# Patient Record
Sex: MALE | Race: WHITE | NOT HISPANIC OR LATINO | Employment: FULL TIME | URBAN - NONMETROPOLITAN AREA
[De-identification: names, ages, dates, MRNs, and addresses within clinical notes are randomized per-mention and may not be internally consistent; named-entity substitution may affect disease eponyms.]

---

## 2023-03-13 LAB
BASOPHILS (10*3/UL) IN BLOOD BY AUTOMATED COUNT: 0.03 X10E9/L (ref 0–0.1)
BASOPHILS/100 LEUKOCYTES IN BLOOD BY AUTOMATED COUNT: 0.4 % (ref 0–2)
EOSINOPHILS (10*3/UL) IN BLOOD BY AUTOMATED COUNT: 0.46 X10E9/L (ref 0–0.7)
EOSINOPHILS/100 LEUKOCYTES IN BLOOD BY AUTOMATED COUNT: 6.5 % (ref 0–6)
ERYTHROCYTE DISTRIBUTION WIDTH (RATIO) BY AUTOMATED COUNT: 14.1 % (ref 11.5–14.5)
ERYTHROCYTE MEAN CORPUSCULAR HEMOGLOBIN CONCENTRATION (G/DL) BY AUTOMATED: 29.1 G/DL (ref 32–36)
ERYTHROCYTE MEAN CORPUSCULAR VOLUME (FL) BY AUTOMATED COUNT: 76 FL (ref 80–100)
ERYTHROCYTES (10*6/UL) IN BLOOD BY AUTOMATED COUNT: 4.47 X10E12/L (ref 4.5–5.9)
FERRITIN (UG/LL) IN SER/PLAS: 12 UG/L (ref 20–300)
HEMATOCRIT (%) IN BLOOD BY AUTOMATED COUNT: 34 % (ref 41–52)
HEMOGLOBIN (G/DL) IN BLOOD: 9.9 G/DL (ref 13.5–17.5)
IMMATURE GRANULOCYTES/100 LEUKOCYTES IN BLOOD BY AUTOMATED COUNT: 0.6 % (ref 0–0.9)
IRON (UG/DL) IN SER/PLAS: 21 UG/DL (ref 35–150)
IRON BINDING CAPACITY (UG/DL) IN SER/PLAS: >471 UG/DL (ref 240–445)
IRON SATURATION (%) IN SER/PLAS: ABNORMAL % (ref 25–45)
LEUKOCYTES (10*3/UL) IN BLOOD BY AUTOMATED COUNT: 7.1 X10E9/L (ref 4.4–11.3)
LYMPHOCYTES (10*3/UL) IN BLOOD BY AUTOMATED COUNT: 1.39 X10E9/L (ref 1.2–4.8)
LYMPHOCYTES/100 LEUKOCYTES IN BLOOD BY AUTOMATED COUNT: 19.5 % (ref 13–44)
MONOCYTES (10*3/UL) IN BLOOD BY AUTOMATED COUNT: 0.49 X10E9/L (ref 0.1–1)
MONOCYTES/100 LEUKOCYTES IN BLOOD BY AUTOMATED COUNT: 6.9 % (ref 2–10)
NEUTROPHILS (10*3/UL) IN BLOOD BY AUTOMATED COUNT: 4.71 X10E9/L (ref 1.2–7.7)
NEUTROPHILS/100 LEUKOCYTES IN BLOOD BY AUTOMATED COUNT: 66.1 % (ref 40–80)
PLATELETS (10*3/UL) IN BLOOD AUTOMATED COUNT: 244 X10E9/L (ref 150–450)

## 2023-04-03 LAB
BASOPHILS (10*3/UL) IN BLOOD BY AUTOMATED COUNT: 0.05 X10E9/L (ref 0–0.1)
BASOPHILS/100 LEUKOCYTES IN BLOOD BY AUTOMATED COUNT: 0.6 % (ref 0–2)
DACROCYTES PRESENCE IN BLOOD BY LIGHT MICROSCOPY: NORMAL
EOSINOPHILS (10*3/UL) IN BLOOD BY AUTOMATED COUNT: 0.53 X10E9/L (ref 0–0.7)
EOSINOPHILS/100 LEUKOCYTES IN BLOOD BY AUTOMATED COUNT: 6.8 % (ref 0–6)
ERYTHROCYTE DISTRIBUTION WIDTH (RATIO) BY AUTOMATED COUNT: 20.4 % (ref 11.5–14.5)
ERYTHROCYTE MEAN CORPUSCULAR HEMOGLOBIN CONCENTRATION (G/DL) BY AUTOMATED: 30.4 G/DL (ref 32–36)
ERYTHROCYTE MEAN CORPUSCULAR VOLUME (FL) BY AUTOMATED COUNT: 79 FL (ref 80–100)
ERYTHROCYTES (10*6/UL) IN BLOOD BY AUTOMATED COUNT: 4.87 X10E12/L (ref 4.5–5.9)
FERRITIN (UG/LL) IN SER/PLAS: 109 UG/L (ref 20–300)
HEMATOCRIT (%) IN BLOOD BY AUTOMATED COUNT: 38.5 % (ref 41–52)
HEMOGLOBIN (G/DL) IN BLOOD: 11.7 G/DL (ref 13.5–17.5)
IMMATURE GRANULOCYTES/100 LEUKOCYTES IN BLOOD BY AUTOMATED COUNT: 0.4 % (ref 0–0.9)
IRON (UG/DL) IN SER/PLAS: 35 UG/DL (ref 35–150)
IRON BINDING CAPACITY (UG/DL) IN SER/PLAS: 414 UG/DL (ref 240–445)
IRON SATURATION (%) IN SER/PLAS: 8 % (ref 25–45)
LEUKOCYTES (10*3/UL) IN BLOOD BY AUTOMATED COUNT: 7.8 X10E9/L (ref 4.4–11.3)
LYMPHOCYTES (10*3/UL) IN BLOOD BY AUTOMATED COUNT: 1.36 X10E9/L (ref 1.2–4.8)
LYMPHOCYTES/100 LEUKOCYTES IN BLOOD BY AUTOMATED COUNT: 17.3 % (ref 13–44)
MONOCYTES (10*3/UL) IN BLOOD BY AUTOMATED COUNT: 0.49 X10E9/L (ref 0.1–1)
MONOCYTES/100 LEUKOCYTES IN BLOOD BY AUTOMATED COUNT: 6.3 % (ref 2–10)
NEUTROPHILS (10*3/UL) IN BLOOD BY AUTOMATED COUNT: 5.38 X10E9/L (ref 1.2–7.7)
NEUTROPHILS/100 LEUKOCYTES IN BLOOD BY AUTOMATED COUNT: 68.6 % (ref 40–80)
OVALOCYTES PRESENCE IN BLOOD BY LIGHT MICROSCOPY: NORMAL
PLATELETS (10*3/UL) IN BLOOD AUTOMATED COUNT: 313 X10E9/L (ref 150–450)
PLATELETS GIANT PRESENCE IN BLOOD BY LIGHT MICROSCOPY: NORMAL
POLYCHROMASIA IN BLOOD BY LIGHT MICROSCOPY: NORMAL
RBC MORPHOLOGY IN BLOOD: NORMAL
SCHISTOCYTES (PRESENCE) IN BLOOD BY LIGHT MICROSCOPY: NORMAL

## 2023-04-26 LAB
ERYTHROCYTE DISTRIBUTION WIDTH (RATIO) BY AUTOMATED COUNT: 21.1 % (ref 11.5–14.5)
ERYTHROCYTE MEAN CORPUSCULAR HEMOGLOBIN CONCENTRATION (G/DL) BY AUTOMATED: 32.6 G/DL (ref 32–36)
ERYTHROCYTE MEAN CORPUSCULAR VOLUME (FL) BY AUTOMATED COUNT: 80 FL (ref 80–100)
ERYTHROCYTES (10*6/UL) IN BLOOD BY AUTOMATED COUNT: 5.18 X10E12/L (ref 4.5–5.9)
FERRITIN (UG/LL) IN SER/PLAS: 220 UG/L (ref 20–300)
HEMATOCRIT (%) IN BLOOD BY AUTOMATED COUNT: 41.4 % (ref 41–52)
HEMOGLOBIN (G/DL) IN BLOOD: 13.5 G/DL (ref 13.5–17.5)
IRON (UG/DL) IN SER/PLAS: 78 UG/DL (ref 35–150)
IRON BINDING CAPACITY (UG/DL) IN SER/PLAS: 356 UG/DL (ref 240–445)
IRON SATURATION (%) IN SER/PLAS: 22 % (ref 25–45)
LEUKOCYTES (10*3/UL) IN BLOOD BY AUTOMATED COUNT: 7.1 X10E9/L (ref 4.4–11.3)
PLATELETS (10*3/UL) IN BLOOD AUTOMATED COUNT: 272 X10E9/L (ref 150–450)

## 2023-07-17 LAB
ALANINE AMINOTRANSFERASE (SGPT) (U/L) IN SER/PLAS: 79 U/L (ref 10–52)
ALBUMIN (G/DL) IN SER/PLAS: 4.4 G/DL (ref 3.4–5)
ALKALINE PHOSPHATASE (U/L) IN SER/PLAS: 62 U/L (ref 33–120)
ASPARTATE AMINOTRANSFERASE (SGOT) (U/L) IN SER/PLAS: 51 U/L (ref 9–39)
BASOPHILS (10*3/UL) IN BLOOD BY AUTOMATED COUNT: 0.05 X10E9/L (ref 0–0.1)
BASOPHILS (10*3/UL) IN BLOOD BY AUTOMATED COUNT: NORMAL
BASOPHILS/100 LEUKOCYTES IN BLOOD BY AUTOMATED COUNT: 0.7 % (ref 0–2)
BASOPHILS/100 LEUKOCYTES IN BLOOD BY AUTOMATED COUNT: NORMAL
BILIRUBIN DIRECT (MG/DL) IN SER/PLAS: 0.1 MG/DL (ref 0–0.3)
BILIRUBIN TOTAL (MG/DL) IN SER/PLAS: 0.7 MG/DL (ref 0–1.2)
EOSINOPHILS (10*3/UL) IN BLOOD BY AUTOMATED COUNT: 0.4 X10E9/L (ref 0–0.7)
EOSINOPHILS (10*3/UL) IN BLOOD BY AUTOMATED COUNT: NORMAL
EOSINOPHILS/100 LEUKOCYTES IN BLOOD BY AUTOMATED COUNT: 5.7 % (ref 0–6)
EOSINOPHILS/100 LEUKOCYTES IN BLOOD BY AUTOMATED COUNT: NORMAL
ERYTHROCYTE DISTRIBUTION WIDTH (RATIO) BY AUTOMATED COUNT: 13.1 % (ref 11.5–14.5)
ERYTHROCYTE DISTRIBUTION WIDTH (RATIO) BY AUTOMATED COUNT: NORMAL
ERYTHROCYTE MEAN CORPUSCULAR HEMOGLOBIN CONCENTRATION (G/DL) BY AUTOMATED: 30 G/DL (ref 32–36)
ERYTHROCYTE MEAN CORPUSCULAR HEMOGLOBIN CONCENTRATION (G/DL) BY AUTOMATED: NORMAL
ERYTHROCYTE MEAN CORPUSCULAR VOLUME (FL) BY AUTOMATED COUNT: 76 FL (ref 80–100)
ERYTHROCYTE MEAN CORPUSCULAR VOLUME (FL) BY AUTOMATED COUNT: NORMAL
ERYTHROCYTES (10*6/UL) IN BLOOD BY AUTOMATED COUNT: 4.43 X10E12/L (ref 4.5–5.9)
ERYTHROCYTES (10*6/UL) IN BLOOD BY AUTOMATED COUNT: NORMAL
FERRITIN (UG/LL) IN SER/PLAS: 13 UG/L (ref 20–300)
HEMATOCRIT (%) IN BLOOD BY AUTOMATED COUNT: 33.7 % (ref 41–52)
HEMATOCRIT (%) IN BLOOD BY AUTOMATED COUNT: NORMAL
HEMOGLOBIN (G/DL) IN BLOOD: 10.1 G/DL (ref 13.5–17.5)
HEMOGLOBIN (G/DL) IN BLOOD: NORMAL
IMMATURE GRANULOCYTES/100 LEUKOCYTES IN BLOOD BY AUTOMATED COUNT: 0.3 % (ref 0–0.9)
IMMATURE GRANULOCYTES/100 LEUKOCYTES IN BLOOD BY AUTOMATED COUNT: NORMAL
INR IN PPP BY COAGULATION ASSAY: 1.1 (ref 0.9–1.1)
IRON (UG/DL) IN SER/PLAS: 19 UG/DL (ref 35–150)
IRON BINDING CAPACITY (UG/DL) IN SER/PLAS: >469 UG/DL (ref 240–445)
IRON SATURATION (%) IN SER/PLAS: ABNORMAL % (ref 25–45)
LEUKOCYTES (10*3/UL) IN BLOOD BY AUTOMATED COUNT: 7.1 X10E9/L (ref 4.4–11.3)
LEUKOCYTES (10*3/UL) IN BLOOD BY AUTOMATED COUNT: NORMAL
LYMPHOCYTES (10*3/UL) IN BLOOD BY AUTOMATED COUNT: 1.33 X10E9/L (ref 1.2–4.8)
LYMPHOCYTES (10*3/UL) IN BLOOD BY AUTOMATED COUNT: NORMAL
LYMPHOCYTES/100 LEUKOCYTES IN BLOOD BY AUTOMATED COUNT: 18.8 % (ref 13–44)
LYMPHOCYTES/100 LEUKOCYTES IN BLOOD BY AUTOMATED COUNT: NORMAL
MANUAL DIFFERENTIAL Y/N: NORMAL
MONOCYTES (10*3/UL) IN BLOOD BY AUTOMATED COUNT: 0.51 X10E9/L (ref 0.1–1)
MONOCYTES (10*3/UL) IN BLOOD BY AUTOMATED COUNT: NORMAL
MONOCYTES/100 LEUKOCYTES IN BLOOD BY AUTOMATED COUNT: 7.2 % (ref 2–10)
MONOCYTES/100 LEUKOCYTES IN BLOOD BY AUTOMATED COUNT: NORMAL
NEUTROPHILS (10*3/UL) IN BLOOD BY AUTOMATED COUNT: 4.76 X10E9/L (ref 1.2–7.7)
NEUTROPHILS (10*3/UL) IN BLOOD BY AUTOMATED COUNT: NORMAL
NEUTROPHILS/100 LEUKOCYTES IN BLOOD BY AUTOMATED COUNT: 67.3 % (ref 40–80)
NEUTROPHILS/100 LEUKOCYTES IN BLOOD BY AUTOMATED COUNT: NORMAL
NRBC (PER 100 WBCS) BY AUTOMATED COUNT: NORMAL
PLATELETS (10*3/UL) IN BLOOD AUTOMATED COUNT: 255 X10E9/L (ref 150–450)
PLATELETS (10*3/UL) IN BLOOD AUTOMATED COUNT: NORMAL
PROTEIN TOTAL: 7.1 G/DL (ref 6.4–8.2)
PROTHROMBIN TIME (PT) IN PPP BY COAGULATION ASSAY: 12.5 SEC (ref 9.8–12.8)

## 2023-07-18 LAB
CERULOPLASMIN (MG/DL) IN SER/PLAS: 28 MG/DL (ref 20–60)
COBALAMIN (VITAMIN B12) (PG/ML) IN SER/PLAS: >2000 PG/ML (ref 211–911)
FOLATE (NG/ML) IN SER/PLAS: 8.8 NG/ML
HEPATITIS A TOTAL AB INTERPRETATION: REACTIVE
HEPATITIS B VIRUS CORE AB (PRESENCE) IN SER/PLAS BY IMM: NONREACTIVE
HEPATITIS B VIRUS SURFACE AB (MIU/ML) IN SERUM: <3.1 MIU/ML

## 2023-07-19 LAB — ERYTHROPOIETIN: 264 MU/ML (ref 4–27)

## 2023-07-21 LAB
ALPHA-1 ANTITRYPSIN PHENOTYPE: NORMAL
ALPHA-1-ANTITRYPSIN (REF): 176 MG/DL (ref 90–200)

## 2023-08-31 PROBLEM — Z86.2 H/O IRON DEFICIENCY ANEMIA: Status: ACTIVE | Noted: 2023-08-31

## 2023-08-31 PROBLEM — D51.0 PERNICIOUS ANEMIA: Status: ACTIVE | Noted: 2023-08-31

## 2023-08-31 PROBLEM — R53.82 CHRONIC FATIGUE: Status: ACTIVE | Noted: 2023-08-31

## 2023-08-31 PROBLEM — E66.01 CLASS 3 SEVERE OBESITY DUE TO EXCESS CALORIES WITH BODY MASS INDEX (BMI) OF 40.0 TO 44.9 IN ADULT (MULTI): Status: ACTIVE | Noted: 2023-08-31

## 2023-08-31 PROBLEM — K21.9 GERD (GASTROESOPHAGEAL REFLUX DISEASE): Status: ACTIVE | Noted: 2023-08-31

## 2023-08-31 PROBLEM — K44.9 HIATAL HERNIA WITH GERD: Status: ACTIVE | Noted: 2023-08-31

## 2023-08-31 PROBLEM — E78.5 DYSLIPIDEMIA: Status: ACTIVE | Noted: 2023-08-31

## 2023-08-31 PROBLEM — E56.9 VITAMIN DEFICIENCY: Status: ACTIVE | Noted: 2023-08-31

## 2023-08-31 PROBLEM — R16.1 SPLENOMEGALY: Status: ACTIVE | Noted: 2023-08-31

## 2023-08-31 PROBLEM — E66.813 CLASS 3 SEVERE OBESITY DUE TO EXCESS CALORIES WITH BODY MASS INDEX (BMI) OF 40.0 TO 44.9 IN ADULT: Status: ACTIVE | Noted: 2023-08-31

## 2023-08-31 PROBLEM — K76.0 HEPATIC STEATOSIS: Status: ACTIVE | Noted: 2023-08-31

## 2023-08-31 PROBLEM — M94.20 CHONDROMALACIA: Status: ACTIVE | Noted: 2023-08-31

## 2023-08-31 PROBLEM — D64.9 ANEMIA: Status: ACTIVE | Noted: 2023-08-31

## 2023-08-31 PROBLEM — E66.9 OBESITY: Status: ACTIVE | Noted: 2023-08-31

## 2023-08-31 PROBLEM — K21.9 HIATAL HERNIA WITH GERD: Status: ACTIVE | Noted: 2023-08-31

## 2023-08-31 PROBLEM — Z91.018 H/O FOOD ALLERGY: Status: ACTIVE | Noted: 2023-08-31

## 2023-08-31 PROBLEM — T78.2XXA ANAPHYLAXIS: Status: ACTIVE | Noted: 2023-08-31

## 2023-08-31 RX ORDER — EPINEPHRINE 0.3 MG/.3ML
INJECTION INTRAMUSCULAR
COMMUNITY
Start: 2015-07-29

## 2023-08-31 RX ORDER — FERROUS SULFATE 325(65) MG
2 TABLET, DELAYED RELEASE (ENTERIC COATED) ORAL DAILY
COMMUNITY
Start: 2021-08-03 | End: 2023-10-16 | Stop reason: ALTCHOICE

## 2023-08-31 RX ORDER — CYANOCOBALAMIN 1000 UG/ML
INJECTION, SOLUTION INTRAMUSCULAR; SUBCUTANEOUS
COMMUNITY
End: 2024-03-22

## 2023-08-31 RX ORDER — OMEPRAZOLE 10 MG/1
1 CAPSULE, DELAYED RELEASE ORAL DAILY
COMMUNITY
End: 2023-10-16

## 2023-08-31 RX ORDER — ERGOCALCIFEROL 1.25 MG/1
1 CAPSULE ORAL WEEKLY
COMMUNITY
Start: 2021-06-23 | End: 2023-10-16 | Stop reason: ALTCHOICE

## 2023-08-31 RX ORDER — OMEPRAZOLE 40 MG/1
1 CAPSULE, DELAYED RELEASE ORAL DAILY
COMMUNITY
Start: 2021-10-14

## 2023-08-31 RX ORDER — FOLIC ACID 1 MG/1
1 TABLET ORAL DAILY
COMMUNITY
Start: 2022-12-05 | End: 2024-03-22

## 2023-09-18 LAB
ERYTHROCYTE DISTRIBUTION WIDTH (RATIO) BY AUTOMATED COUNT: 15.6 % (ref 11.5–14.5)
ERYTHROCYTE MEAN CORPUSCULAR HEMOGLOBIN CONCENTRATION (G/DL) BY AUTOMATED: 30 G/DL (ref 32–36)
ERYTHROCYTE MEAN CORPUSCULAR VOLUME (FL) BY AUTOMATED COUNT: 76 FL (ref 80–100)
ERYTHROCYTES (10*6/UL) IN BLOOD BY AUTOMATED COUNT: 4.5 X10E12/L (ref 4.5–5.9)
FERRITIN (UG/LL) IN SER/PLAS: 10 UG/L (ref 20–300)
HEMATOCRIT (%) IN BLOOD BY AUTOMATED COUNT: 34.3 % (ref 41–52)
HEMOGLOBIN (G/DL) IN BLOOD: 10.3 G/DL (ref 13.5–17.5)
IRON (UG/DL) IN SER/PLAS: 34 UG/DL (ref 35–150)
IRON BINDING CAPACITY (UG/DL) IN SER/PLAS: 455 UG/DL (ref 240–445)
IRON SATURATION (%) IN SER/PLAS: 7 % (ref 25–45)
LEUKOCYTES (10*3/UL) IN BLOOD BY AUTOMATED COUNT: 8.4 X10E9/L (ref 4.4–11.3)
PLATELETS (10*3/UL) IN BLOOD AUTOMATED COUNT: 288 X10E9/L (ref 150–450)

## 2023-09-19 PROBLEM — K90.9 IRON MALABSORPTION (HHS-HCC): Status: ACTIVE | Noted: 2023-09-19

## 2023-09-19 LAB
COBALAMIN (VITAMIN B12) (PG/ML) IN SER/PLAS: 632 PG/ML (ref 211–911)
FOLATE (NG/ML) IN SER/PLAS: 8.9 NG/ML

## 2023-09-19 RX ORDER — FAMOTIDINE 10 MG/ML
20 INJECTION INTRAVENOUS ONCE AS NEEDED
Status: CANCELLED | OUTPATIENT
Start: 2023-10-02

## 2023-09-19 RX ORDER — DIPHENHYDRAMINE HYDROCHLORIDE 50 MG/ML
50 INJECTION INTRAMUSCULAR; INTRAVENOUS AS NEEDED
Status: CANCELLED | OUTPATIENT
Start: 2023-10-02

## 2023-09-19 RX ORDER — ALBUTEROL SULFATE 0.83 MG/ML
3 SOLUTION RESPIRATORY (INHALATION) AS NEEDED
Status: CANCELLED | OUTPATIENT
Start: 2023-10-02

## 2023-09-19 RX ORDER — EPINEPHRINE 0.3 MG/.3ML
0.3 INJECTION SUBCUTANEOUS EVERY 5 MIN PRN
Status: CANCELLED | OUTPATIENT
Start: 2023-10-02

## 2023-09-21 LAB — ERYTHROPOIETIN: NORMAL MU/ML (ref 4–27)

## 2023-09-29 DIAGNOSIS — E53.8 B12 DEFICIENCY: Primary | ICD-10-CM

## 2023-09-29 RX ORDER — EPINEPHRINE 0.3 MG/.3ML
0.3 INJECTION SUBCUTANEOUS EVERY 5 MIN PRN
Status: CANCELLED | OUTPATIENT
Start: 2023-10-09

## 2023-09-29 RX ORDER — FAMOTIDINE 10 MG/ML
20 INJECTION INTRAVENOUS ONCE AS NEEDED
Status: CANCELLED | OUTPATIENT
Start: 2023-10-09

## 2023-09-29 RX ORDER — METHYLPREDNISOLONE SODIUM SUCCINATE 40 MG/ML
40 INJECTION INTRAMUSCULAR; INTRAVENOUS AS NEEDED
Status: CANCELLED | OUTPATIENT
Start: 2023-10-09

## 2023-09-29 RX ORDER — HEPARIN 100 UNIT/ML
500 SYRINGE INTRAVENOUS AS NEEDED
Status: CANCELLED | OUTPATIENT
Start: 2023-10-03

## 2023-09-29 RX ORDER — DIPHENHYDRAMINE HYDROCHLORIDE 50 MG/ML
50 INJECTION INTRAMUSCULAR; INTRAVENOUS AS NEEDED
Status: CANCELLED | OUTPATIENT
Start: 2023-10-09

## 2023-09-29 RX ORDER — ALBUTEROL SULFATE 0.83 MG/ML
3 SOLUTION RESPIRATORY (INHALATION) AS NEEDED
Status: CANCELLED | OUTPATIENT
Start: 2023-10-09

## 2023-09-29 RX ORDER — HEPARIN SODIUM,PORCINE/PF 10 UNIT/ML
50 SYRINGE (ML) INTRAVENOUS AS NEEDED
Status: CANCELLED | OUTPATIENT
Start: 2023-10-03

## 2023-09-29 RX ORDER — CYANOCOBALAMIN 1000 UG/ML
1000 INJECTION, SOLUTION INTRAMUSCULAR; SUBCUTANEOUS ONCE
Status: CANCELLED | OUTPATIENT
Start: 2023-10-09

## 2023-10-02 ENCOUNTER — APPOINTMENT (OUTPATIENT)
Dept: HEMATOLOGY/ONCOLOGY | Facility: HOSPITAL | Age: 28
End: 2023-10-02
Payer: COMMERCIAL

## 2023-10-09 ENCOUNTER — INFUSION (OUTPATIENT)
Dept: HEMATOLOGY/ONCOLOGY | Facility: HOSPITAL | Age: 28
End: 2023-10-09
Payer: COMMERCIAL

## 2023-10-09 VITALS
RESPIRATION RATE: 16 BRPM | TEMPERATURE: 97.2 F | HEIGHT: 74 IN | SYSTOLIC BLOOD PRESSURE: 156 MMHG | HEART RATE: 86 BPM | OXYGEN SATURATION: 98 % | BODY MASS INDEX: 40.18 KG/M2 | WEIGHT: 313.05 LBS | DIASTOLIC BLOOD PRESSURE: 78 MMHG

## 2023-10-09 DIAGNOSIS — E53.8 B12 DEFICIENCY: ICD-10-CM

## 2023-10-09 PROCEDURE — 2500000004 HC RX 250 GENERAL PHARMACY W/ HCPCS (ALT 636 FOR OP/ED): Mod: SE | Performed by: INTERNAL MEDICINE

## 2023-10-09 PROCEDURE — 96372 THER/PROPH/DIAG INJ SC/IM: CPT

## 2023-10-09 RX ORDER — CYANOCOBALAMIN 1000 UG/ML
1000 INJECTION, SOLUTION INTRAMUSCULAR; SUBCUTANEOUS ONCE
Status: COMPLETED | OUTPATIENT
Start: 2023-10-09 | End: 2023-10-09

## 2023-10-09 RX ORDER — DIPHENHYDRAMINE HYDROCHLORIDE 50 MG/ML
50 INJECTION INTRAMUSCULAR; INTRAVENOUS AS NEEDED
Status: CANCELLED | OUTPATIENT
Start: 2023-11-06

## 2023-10-09 RX ORDER — FAMOTIDINE 10 MG/ML
20 INJECTION INTRAVENOUS ONCE AS NEEDED
Status: CANCELLED | OUTPATIENT
Start: 2023-11-06

## 2023-10-09 RX ORDER — ALBUTEROL SULFATE 0.83 MG/ML
3 SOLUTION RESPIRATORY (INHALATION) AS NEEDED
Status: CANCELLED | OUTPATIENT
Start: 2023-11-06

## 2023-10-09 RX ORDER — EPINEPHRINE 0.3 MG/.3ML
0.3 INJECTION SUBCUTANEOUS EVERY 5 MIN PRN
Status: CANCELLED | OUTPATIENT
Start: 2023-11-06

## 2023-10-09 RX ORDER — CYANOCOBALAMIN 1000 UG/ML
1000 INJECTION, SOLUTION INTRAMUSCULAR; SUBCUTANEOUS ONCE
Status: CANCELLED | OUTPATIENT
Start: 2023-11-06

## 2023-10-09 RX ADMIN — CYANOCOBALAMIN 1000 MCG: 1000 INJECTION, SOLUTION INTRAMUSCULAR at 11:07

## 2023-10-09 ASSESSMENT — PAIN SCALES - GENERAL: PAINLEVEL: 0-NO PAIN

## 2023-10-16 ENCOUNTER — CLINICAL SUPPORT (OUTPATIENT)
Dept: PREADMISSION TESTING | Facility: HOSPITAL | Age: 28
End: 2023-10-16
Payer: COMMERCIAL

## 2023-10-16 ASSESSMENT — CHADS2 SCORE
CHADS2 SCORE: 0
CHF: NO
HYPERTENSION: NO
DIABETES: NO
AGE GREATER THAN OR EQUAL TO 75: NO
PRIOR STROKE OR TIA OR THROMBOEMBOLISM: NO

## 2023-10-16 NOTE — PREPROCEDURE INSTRUCTIONS
Do not eat ANY SOLID FOOD, chew gum, candy or smoke after midnight.    You may have CLEAR liquids up to THREE hours prior to your surgery time.    We will call you between 1:00-3:00 pm the day before your surgery with your arrival time.  Please come directly to the 2nd floor Outpatient Department on the day of your procedure.  Please use the back/ER entrance.     If you have any questions, please call [phone].    Thank you.       Medication List            Accurate as of October 16, 2023 12:32 PM. Always use your most recent med list.                cyanocobalamin 1,000 mcg/mL injection  Commonly known as: Vitamin B-12     EpiPen 2-Adam 0.3 mg/0.3 mL injection syringe  Generic drug: EPINEPHrine     folic acid 1 mg tablet  Commonly known as: Folvite     omeprazole 40 mg DR capsule  Commonly known as: PriLOSEC

## 2023-10-17 ENCOUNTER — APPOINTMENT (OUTPATIENT)
Dept: PREADMISSION TESTING | Facility: HOSPITAL | Age: 28
End: 2023-10-17
Payer: COMMERCIAL

## 2023-10-27 ENCOUNTER — ANESTHESIA (OUTPATIENT)
Dept: GASTROENTEROLOGY | Facility: HOSPITAL | Age: 28
End: 2023-10-27
Payer: COMMERCIAL

## 2023-10-27 ENCOUNTER — ANESTHESIA EVENT (OUTPATIENT)
Dept: GASTROENTEROLOGY | Facility: HOSPITAL | Age: 28
End: 2023-10-27
Payer: COMMERCIAL

## 2023-10-27 ENCOUNTER — HOSPITAL ENCOUNTER (OUTPATIENT)
Dept: GASTROENTEROLOGY | Facility: HOSPITAL | Age: 28
Setting detail: OUTPATIENT SURGERY
Discharge: HOME | End: 2023-10-27
Payer: COMMERCIAL

## 2023-10-27 VITALS
TEMPERATURE: 96.8 F | HEIGHT: 74 IN | OXYGEN SATURATION: 95 % | WEIGHT: 315 LBS | BODY MASS INDEX: 40.43 KG/M2 | SYSTOLIC BLOOD PRESSURE: 150 MMHG | DIASTOLIC BLOOD PRESSURE: 70 MMHG | HEART RATE: 72 BPM | RESPIRATION RATE: 18 BRPM

## 2023-10-27 DIAGNOSIS — D64.9 ANEMIA, UNSPECIFIED: ICD-10-CM

## 2023-10-27 DIAGNOSIS — K44.9 DIAPHRAGMATIC HERNIA WITHOUT OBSTRUCTION OR GANGRENE: ICD-10-CM

## 2023-10-27 PROCEDURE — 43239 EGD BIOPSY SINGLE/MULTIPLE: CPT | Performed by: INTERNAL MEDICINE

## 2023-10-27 PROCEDURE — 7100000010 HC PHASE TWO TIME - EACH INCREMENTAL 1 MINUTE

## 2023-10-27 PROCEDURE — 88305 TISSUE EXAM BY PATHOLOGIST: CPT | Mod: TC,SUR,GENLAB,WESLAB | Performed by: INTERNAL MEDICINE

## 2023-10-27 PROCEDURE — 2500000004 HC RX 250 GENERAL PHARMACY W/ HCPCS (ALT 636 FOR OP/ED): Mod: SE

## 2023-10-27 PROCEDURE — 88305 TISSUE EXAM BY PATHOLOGIST: CPT | Mod: TC,GENLAB | Performed by: INTERNAL MEDICINE

## 2023-10-27 PROCEDURE — 7100000009 HC PHASE TWO TIME - INITIAL BASE CHARGE

## 2023-10-27 PROCEDURE — 88305 TISSUE EXAM BY PATHOLOGIST: CPT | Performed by: PATHOLOGY

## 2023-10-27 PROCEDURE — 2500000005 HC RX 250 GENERAL PHARMACY W/O HCPCS: Mod: SE

## 2023-10-27 PROCEDURE — 45330 DIAGNOSTIC SIGMOIDOSCOPY: CPT | Performed by: INTERNAL MEDICINE

## 2023-10-27 PROCEDURE — 3700000001 HC GENERAL ANESTHESIA TIME - INITIAL BASE CHARGE

## 2023-10-27 PROCEDURE — 3700000002 HC GENERAL ANESTHESIA TIME - EACH INCREMENTAL 1 MINUTE

## 2023-10-27 PROCEDURE — 88305 TISSUE EXAM BY PATHOLOGIST: CPT | Mod: TC,SUR,GENLAB | Performed by: INTERNAL MEDICINE

## 2023-10-27 RX ORDER — PROPOFOL 10 MG/ML
INJECTION, EMULSION INTRAVENOUS AS NEEDED
Status: DISCONTINUED | OUTPATIENT
Start: 2023-10-27 | End: 2023-10-27

## 2023-10-27 RX ORDER — LIDOCAINE HYDROCHLORIDE 20 MG/ML
INJECTION, SOLUTION INFILTRATION; PERINEURAL AS NEEDED
Status: DISCONTINUED | OUTPATIENT
Start: 2023-10-27 | End: 2023-10-27

## 2023-10-27 RX ORDER — GLYCOPYRROLATE 0.2 MG/ML
INJECTION INTRAMUSCULAR; INTRAVENOUS AS NEEDED
Status: DISCONTINUED | OUTPATIENT
Start: 2023-10-27 | End: 2023-10-27

## 2023-10-27 RX ORDER — SODIUM CHLORIDE, SODIUM LACTATE, POTASSIUM CHLORIDE, CALCIUM CHLORIDE 600; 310; 30; 20 MG/100ML; MG/100ML; MG/100ML; MG/100ML
20 INJECTION, SOLUTION INTRAVENOUS CONTINUOUS
Status: DISCONTINUED | OUTPATIENT
Start: 2023-10-27 | End: 2023-10-28 | Stop reason: HOSPADM

## 2023-10-27 RX ADMIN — PROPOFOL 50 MG: 10 INJECTION, EMULSION INTRAVENOUS at 08:05

## 2023-10-27 RX ADMIN — SODIUM CHLORIDE, POTASSIUM CHLORIDE, SODIUM LACTATE AND CALCIUM CHLORIDE 20 ML/HR: 600; 310; 30; 20 INJECTION, SOLUTION INTRAVENOUS at 07:13

## 2023-10-27 RX ADMIN — LIDOCAINE HYDROCHLORIDE 40 MG: 20 INJECTION, SOLUTION INFILTRATION; PERINEURAL at 08:03

## 2023-10-27 RX ADMIN — PROPOFOL 10 MG: 10 INJECTION, EMULSION INTRAVENOUS at 08:11

## 2023-10-27 RX ADMIN — GLYCOPYRROLATE 0.2 MG: 0.2 INJECTION, SOLUTION INTRAMUSCULAR; INTRAVENOUS at 08:00

## 2023-10-27 RX ADMIN — PROPOFOL 50 MG: 10 INJECTION, EMULSION INTRAVENOUS at 08:07

## 2023-10-27 RX ADMIN — PROPOFOL 50 MG: 10 INJECTION, EMULSION INTRAVENOUS at 08:13

## 2023-10-27 RX ADMIN — PROPOFOL 140 MG: 10 INJECTION, EMULSION INTRAVENOUS at 08:03

## 2023-10-27 RX ADMIN — PROPOFOL 50 MG: 10 INJECTION, EMULSION INTRAVENOUS at 08:09

## 2023-10-27 SDOH — HEALTH STABILITY: MENTAL HEALTH: CURRENT SMOKER: 0

## 2023-10-27 ASSESSMENT — PAIN - FUNCTIONAL ASSESSMENT: PAIN_FUNCTIONAL_ASSESSMENT: 0-10

## 2023-10-27 ASSESSMENT — ENCOUNTER SYMPTOMS
CHILLS: 0
ABDOMINAL PAIN: 0
VOMITING: 0
SHORTNESS OF BREATH: 0
CONSTIPATION: 0
FEVER: 0
DIARRHEA: 0
BLOOD IN STOOL: 0
NAUSEA: 0

## 2023-10-27 ASSESSMENT — PAIN SCALES - GENERAL
PAIN_LEVEL: 2
PAINLEVEL_OUTOF10: 0 - NO PAIN

## 2023-10-27 ASSESSMENT — COLUMBIA-SUICIDE SEVERITY RATING SCALE - C-SSRS
6. HAVE YOU EVER DONE ANYTHING, STARTED TO DO ANYTHING, OR PREPARED TO DO ANYTHING TO END YOUR LIFE?: NO
2. HAVE YOU ACTUALLY HAD ANY THOUGHTS OF KILLING YOURSELF?: NO
1. IN THE PAST MONTH, HAVE YOU WISHED YOU WERE DEAD OR WISHED YOU COULD GO TO SLEEP AND NOT WAKE UP?: NO

## 2023-10-27 NOTE — ANESTHESIA POSTPROCEDURE EVALUATION
Patient: Melvin Foley    Procedure Summary       Date: 10/27/23 Room / Location: [facility]    Anesthesia Start: 0802 Anesthesia Stop: 0821    Procedures:       EGD      COLONOSCOPY Diagnosis:       Diaphragmatic hernia without obstruction or gangrene      Anemia, unspecified    Scheduled Providers: Stanislav Pfeiffer DO Responsible Provider: ALEIDA Connelly    Anesthesia Type: MAC ASA Status: 3            Anesthesia Type: MAC    Vitals Value Taken Time   /67 10/27/23 0821   Temp 36.3 10/27/23 0821   Pulse 75 10/27/23 0821   Resp 22 10/27/23 0821   SpO2 100 10/27/23 0821       Anesthesia Post Evaluation    Patient location during evaluation: PACU  Patient participation: complete - patient participated  Level of consciousness: awake and alert  Pain score: 2  Pain management: adequate  Airway patency: patent  Cardiovascular status: acceptable  Respiratory status: acceptable and room air  Hydration status: acceptable        There were no known notable events for this encounter.

## 2023-10-27 NOTE — Clinical Note
Patient tolerated the procedure well and is comfortable with no complaints of pain. Vital signs stable. Arousable prior to transport. Patient transported to PACU via stretcher. Handoff completed-JERE Crowe.

## 2023-10-27 NOTE — H&P
History Of Present Illness  Melvin Foley is a 28 y.o. male presenting with anemia and GERD patient presents for EGD and colonoscopy.  He saw nurse practitioner Laura Francois of GI on 9/1.  Patient has longstanding history of hiatal hernia with GERD and being treated with omeprazole.  He is seeing hematology for iron deficiency anemia and has been getting infusions, the last being 2 months ago.  His most recent hemoglobin was 10.31-month ago with a iron level of 34.  He denies any blood in his stool or coughing up or vomiting any blood.  He denies any abdominal pain, nausea, vomiting, diarrhea, constipation, fevers, chills, unexplained weight loss.  He had a colonoscopy and EGD 2 years ago.  The colonoscopy was reportedly normal per GI note.  The EGD showed grade B esophagitis, 3 cm hiatal hernia, negative duodenal biopsies and reactive gastropathy.  He has a history of splenomegaly as well as hepatomegaly and fatty liver disease.  Denies any family history of colon cancer, gastric cancer, pancreatic cancer, Crohn's or autoimmune disease.  Took prep well.  Denies any blood thinners.     Past Medical History  Past Medical History:   Diagnosis Date    B12 deficiency 09/29/2023    GERD (gastroesophageal reflux disease)     Hyperlipidemia, unspecified 06/10/2021    Dyslipidemia    Iron malabsorption 09/19/2023    Personal history of diseases of the blood and blood-forming organs and certain disorders involving the immune mechanism 06/23/2021    History of anemia       Surgical History  Past Surgical History:   Procedure Laterality Date    COLONOSCOPY      DENTAL SURGERY      OTHER SURGICAL HISTORY  06/10/2021    Knee surgery    UPPER GASTROINTESTINAL ENDOSCOPY          Social History  He reports that he has never smoked. He has never been exposed to tobacco smoke. He has never used smokeless tobacco. He reports that he does not drink alcohol and does not use drugs.    Family History  Family History   Problem Relation Name  "Age of Onset    Arthritis Mother      Depression Mother      Hypertension Father          Allergies  Gluten, Cod liver oil, Corn, Egg, Milk containing products (dairy), Peanut, and Pyote    Review of Systems   Constitutional:  Negative for chills and fever.   Respiratory:  Negative for shortness of breath.    Cardiovascular:  Negative for chest pain.   Gastrointestinal:  Negative for abdominal pain, blood in stool, constipation, diarrhea, nausea and vomiting.   All other systems reviewed and are negative.           Physical Exam  General: Well developed, awake/alert/oriented x3, no distress, alert and cooperative. Obese    Skin: Warm and dry    Eyes: EOMI    Head/neck: No apparent injury    Cardiac: RRR    Respiratory: CTAB    GI: Nontender    Extremities: No edema or deformity    Neuro: AxOx3    Psych: Appropriate mood      Last Recorded Vitals  Blood pressure 143/84, pulse 79, temperature 36.6 °C (97.9 °F), temperature source Temporal, resp. rate 17, height 1.88 m (6' 2\"), weight (!) 162 kg (356 lb 0.7 oz), SpO2 97 %.    Relevant Results  Reviewed hematology labs, in chart.      Assessment/Plan         Hiatal hernia with GERD  Iron deficiency anemia  diagnostic EGD and colonoscopy as planned  May need pill endoscopy if no significant results         I spent 20 minutes in the professional and overall care of this patient.      Familia Mejia PA-C    "

## 2023-10-27 NOTE — ANESTHESIA PREPROCEDURE EVALUATION
Patient: Melvin Foley    Procedure Information       Date/Time: 10/27/23 0800    Scheduled providers: Stanislav Pfeiffer DO    Procedures:       EGD      COLONOSCOPY    Location: [facility]            Relevant Problems   Endocrine   (+) Class 3 severe obesity due to excess calories with body mass index (BMI) of 40.0 to 44.9 in adult (CMS/HCC)   (+) Obesity      GI   (+) GERD (gastroesophageal reflux disease)   (+) Hiatal hernia with GERD      /Renal   (+) Hepatic steatosis      GI/Hepatic   (+) Hepatic steatosis      Hematology   (+) Anemia   (+) Pernicious anemia      Musculoskeletal   (+) Chondromalacia      Cardiac and Vasculature   (+) Dyslipidemia      Other   (+) Splenomegaly       Clinical information reviewed:   Tobacco  Allergies  Meds   Med Hx  Surg Hx   Fam Hx  Soc Hx        NPO Detail:  NPO/Void Status  Carbonhydrate Drink Given Prior to Surgery? : N  Date of Last Liquid: 10/26/23  Time of Last Liquid: 2359  Date of Last Solid: 10/25/23  Time of Last Solid: 2200  Last Intake Type: Clear fluids         Physical Exam    Airway  Mallampati: II  TM distance: >3 FB  Neck ROM: full     Cardiovascular - normal exam     Dental - normal exam     Pulmonary - normal exam     Abdominal   (+) obese             Anesthesia Plan    ASA 3     MAC     The patient is not a current smoker.  Patient was previously instructed to abstain from smoking on day of procedure.  Patient did not smoke on day of procedure.    intravenous induction   Anesthetic plan and risks discussed with patient.  Use of blood products discussed with patient who consented to blood products.    Plan discussed with CRNA.

## 2023-11-06 ENCOUNTER — INFUSION (OUTPATIENT)
Dept: HEMATOLOGY/ONCOLOGY | Facility: HOSPITAL | Age: 28
End: 2023-11-06
Payer: COMMERCIAL

## 2023-11-06 ENCOUNTER — DOCUMENTATION (OUTPATIENT)
Dept: HEMATOLOGY/ONCOLOGY | Facility: HOSPITAL | Age: 28
End: 2023-11-06

## 2023-11-06 ENCOUNTER — OFFICE VISIT (OUTPATIENT)
Dept: HEMATOLOGY/ONCOLOGY | Facility: HOSPITAL | Age: 28
End: 2023-11-06
Payer: COMMERCIAL

## 2023-11-06 ENCOUNTER — TELEPHONE (OUTPATIENT)
Dept: HEMATOLOGY/ONCOLOGY | Facility: HOSPITAL | Age: 28
End: 2023-11-06

## 2023-11-06 ENCOUNTER — LAB (OUTPATIENT)
Dept: LAB | Facility: LAB | Age: 28
End: 2023-11-06
Payer: COMMERCIAL

## 2023-11-06 VITALS
WEIGHT: 315 LBS | HEART RATE: 89 BPM | RESPIRATION RATE: 18 BRPM | TEMPERATURE: 97.7 F | OXYGEN SATURATION: 96 % | BODY MASS INDEX: 40.43 KG/M2 | SYSTOLIC BLOOD PRESSURE: 151 MMHG | DIASTOLIC BLOOD PRESSURE: 88 MMHG | HEIGHT: 74 IN

## 2023-11-06 DIAGNOSIS — K90.9 IRON MALABSORPTION (HHS-HCC): ICD-10-CM

## 2023-11-06 DIAGNOSIS — E53.8 B12 DEFICIENCY: ICD-10-CM

## 2023-11-06 DIAGNOSIS — Z86.2 H/O IRON DEFICIENCY ANEMIA: Primary | ICD-10-CM

## 2023-11-06 DIAGNOSIS — R74.8 ABNORMAL LIVER ENZYMES: ICD-10-CM

## 2023-11-06 DIAGNOSIS — K90.9 IRON MALABSORPTION (HHS-HCC): Primary | ICD-10-CM

## 2023-11-06 DIAGNOSIS — K44.9 HIATAL HERNIA: ICD-10-CM

## 2023-11-06 DIAGNOSIS — E53.8 FOLATE DEFICIENCY: ICD-10-CM

## 2023-11-06 LAB
BASOPHILS # BLD AUTO: 0.05 X10*3/UL (ref 0–0.1)
BASOPHILS NFR BLD AUTO: 0.7 %
EOSINOPHIL # BLD AUTO: 0.55 X10*3/UL (ref 0–0.7)
EOSINOPHIL NFR BLD AUTO: 7.5 %
ERYTHROCYTE [DISTWIDTH] IN BLOOD BY AUTOMATED COUNT: 16 % (ref 11.5–14.5)
FERRITIN SERPL-MCNC: 21 NG/ML (ref 20–300)
HCT VFR BLD AUTO: 34.7 % (ref 41–52)
HGB BLD-MCNC: 9.7 G/DL (ref 13.5–17.5)
IMM GRANULOCYTES # BLD AUTO: 0.04 X10*3/UL (ref 0–0.7)
IMM GRANULOCYTES NFR BLD AUTO: 0.5 % (ref 0–0.9)
IRON SATN MFR SERPL: ABNORMAL %
IRON SERPL-MCNC: 21 UG/DL (ref 35–150)
LYMPHOCYTES # BLD AUTO: 1.23 X10*3/UL (ref 1.2–4.8)
LYMPHOCYTES NFR BLD AUTO: 16.7 %
MCH RBC QN AUTO: 20.9 PG (ref 26–34)
MCHC RBC AUTO-ENTMCNC: 28 G/DL (ref 32–36)
MCV RBC AUTO: 75 FL (ref 80–100)
MONOCYTES # BLD AUTO: 0.51 X10*3/UL (ref 0.1–1)
MONOCYTES NFR BLD AUTO: 6.9 %
NEUTROPHILS # BLD AUTO: 5 X10*3/UL (ref 1.2–7.7)
NEUTROPHILS NFR BLD AUTO: 67.7 %
NRBC BLD-RTO: 0 /100 WBCS (ref 0–0)
PLATELET # BLD AUTO: 273 X10*3/UL (ref 150–450)
RBC # BLD AUTO: 4.65 X10*6/UL (ref 4.5–5.9)
TIBC SERPL-MCNC: ABNORMAL UG/DL
UIBC SERPL-MCNC: >450 UG/DL (ref 110–370)
WBC # BLD AUTO: 7.4 X10*3/UL (ref 4.4–11.3)

## 2023-11-06 PROCEDURE — 99214 OFFICE O/P EST MOD 30 MIN: CPT | Mod: 25 | Performed by: INTERNAL MEDICINE

## 2023-11-06 PROCEDURE — 2500000004 HC RX 250 GENERAL PHARMACY W/ HCPCS (ALT 636 FOR OP/ED): Mod: SE | Performed by: INTERNAL MEDICINE

## 2023-11-06 PROCEDURE — 82728 ASSAY OF FERRITIN: CPT

## 2023-11-06 PROCEDURE — 99214 OFFICE O/P EST MOD 30 MIN: CPT | Performed by: INTERNAL MEDICINE

## 2023-11-06 PROCEDURE — 83550 IRON BINDING TEST: CPT

## 2023-11-06 PROCEDURE — 85025 COMPLETE CBC W/AUTO DIFF WBC: CPT

## 2023-11-06 PROCEDURE — 1036F TOBACCO NON-USER: CPT | Performed by: INTERNAL MEDICINE

## 2023-11-06 PROCEDURE — 83540 ASSAY OF IRON: CPT

## 2023-11-06 PROCEDURE — 96372 THER/PROPH/DIAG INJ SC/IM: CPT

## 2023-11-06 PROCEDURE — 36415 COLL VENOUS BLD VENIPUNCTURE: CPT

## 2023-11-06 RX ORDER — ALBUTEROL SULFATE 0.83 MG/ML
3 SOLUTION RESPIRATORY (INHALATION) AS NEEDED
Status: CANCELLED | OUTPATIENT
Start: 2023-12-04

## 2023-11-06 RX ORDER — EPINEPHRINE 0.3 MG/.3ML
0.3 INJECTION SUBCUTANEOUS EVERY 5 MIN PRN
Status: CANCELLED | OUTPATIENT
Start: 2023-12-04

## 2023-11-06 RX ORDER — FAMOTIDINE 10 MG/ML
20 INJECTION INTRAVENOUS ONCE AS NEEDED
Status: CANCELLED | OUTPATIENT
Start: 2023-12-04

## 2023-11-06 RX ORDER — DIPHENHYDRAMINE HYDROCHLORIDE 50 MG/ML
50 INJECTION INTRAMUSCULAR; INTRAVENOUS AS NEEDED
Status: CANCELLED | OUTPATIENT
Start: 2023-11-13

## 2023-11-06 RX ORDER — DIPHENHYDRAMINE HYDROCHLORIDE 50 MG/ML
50 INJECTION INTRAMUSCULAR; INTRAVENOUS AS NEEDED
Status: CANCELLED | OUTPATIENT
Start: 2023-12-04

## 2023-11-06 RX ORDER — EPINEPHRINE 0.3 MG/.3ML
0.3 INJECTION SUBCUTANEOUS EVERY 5 MIN PRN
Status: CANCELLED | OUTPATIENT
Start: 2023-11-13

## 2023-11-06 RX ORDER — CYANOCOBALAMIN 1000 UG/ML
1000 INJECTION, SOLUTION INTRAMUSCULAR; SUBCUTANEOUS ONCE
Status: CANCELLED | OUTPATIENT
Start: 2023-12-04

## 2023-11-06 RX ORDER — ALBUTEROL SULFATE 0.83 MG/ML
3 SOLUTION RESPIRATORY (INHALATION) AS NEEDED
Status: CANCELLED | OUTPATIENT
Start: 2023-11-13

## 2023-11-06 RX ORDER — CYANOCOBALAMIN 1000 UG/ML
1000 INJECTION, SOLUTION INTRAMUSCULAR; SUBCUTANEOUS ONCE
Status: COMPLETED | OUTPATIENT
Start: 2023-11-06 | End: 2023-11-06

## 2023-11-06 RX ORDER — FAMOTIDINE 10 MG/ML
20 INJECTION INTRAVENOUS ONCE AS NEEDED
Status: CANCELLED | OUTPATIENT
Start: 2023-11-13

## 2023-11-06 RX ADMIN — CYANOCOBALAMIN 1000 MCG: 1000 INJECTION, SOLUTION INTRAMUSCULAR at 13:12

## 2023-11-06 ASSESSMENT — PAIN SCALES - GENERAL: PAINLEVEL: 0-NO PAIN

## 2023-11-06 NOTE — PROGRESS NOTES
"Note sent to RN Zuri: \"Labs ordered in 1 month, to follow-up after he receives the IV iron.  Please notify patient to have the blood work done.\"  "

## 2023-11-06 NOTE — PROGRESS NOTES
"Interval History:    Patient is a 28 -year-old white man referred for severe anemia, splenomegaly, and abnormal CT with bowel thickening and hiatal hernia as well as  subcentimeter paraesophageal lymph nodes, details outlined below.  Patient was admitted 6/15/2021 for 1 day with generalized weakness and lightheadedness with a hemoglobin of 4s, noted to have severe iron deficiency anemia, was given 3 units packed red  blood cells, discharged on ferrous sulfate 3 times a day, recommended outpatient endoscopy.   Patient stated that he took iron 3 tablets, 3 times a day, 9 tablets/day for 1 month, ran out, then  took over-the-counter 1/day, no significant side effects even with this high dose of oral iron.  Patient was seen by PCP 6/23/2021 for follow-up anemia with B12 injection given, cough improved  after he was treated for bronchitis 6/2021 with methylprednisolone and azithromycin, chronic medical issues; patient said he only had the one B12 injection and then was requested to see me .  Patient was back in the ER 7/19/2021 with some intermittent upper abdominal discomfort, ran out of a \"little brown pill for my spleen\" for 1 week, intermittent dizziness and headaches over the past week; they refilled his pantoprazole and recommended  follow-up with hematology.        Given persistent iron deficiency despite PO iron, January 2022 Feraheme was given x2 doses, stopped oral iron in August 2022 given likely  ineffective, Feraheme again given 12/2022 x 2 doses, 3/2023 x2 doses (hemoglobin down to 9.9 without any visible bleeding), 4/2023 x2 doses, 7/2023 x 2 doses.  9/25/2023 patient refused Feraheme after 2 IV unsuccessful attempts, and I requested information to be given to the patient about a port-11/6/2023 patient initially said he never got information about a port, just said that a port existed, but as I was walking out the door said that he did get information mailed to him, is still undecided if he wants a port.  " 11/6/2023 patient stated that there is only a 1 or 2 nurses that can get his IV, cannot give me the name of those nurses, says that he only takes iron if he feels bad even though I told him he likely does not absorb that.  9/1/2023 provider Priscila recommended an EGD and colonoscopy.  10/27/2023 Dr. Pfeiffer performed a colonoscopy showing retained stool with prior colonoscopy normal and same-day EGD with source of bleeding so likely not necessary to redo, EGD with 6 cm hiatal hernia with Sachin's lesions, normal-appearing duodenum with biopsy still pending; felt iron deficiency was secondary to hiatal hernia with Sachin's erosions, has an appointment with Dr. Hyatt general surgery 12/4/2023.    Hemoglobin was at the upper limit of normal various times in 2022, erythropoietin was slightly elevated so referred  to Dr. Valenzuela who saw him 9/19/2022 with no signs or symptoms of a pulmonary disorder or sleep apnea, follow-up as needed; erythropoietin was again elevated so CT chest/abdomen/pelvis was performed 12/26/2022 showing no evidence of malignancy but  hepatosplenomegaly with fatty infiltration, referred to Dr. Pfeiffer with hepatology who saw him 5/19/2023 with labs and FibroScan ordered along with weight loss program, likely NAFLD, follow-up in 6 months; he still has not done any of this testing yet except for the labs.  He  has received B12 injections monthly, most recently today 11/6/2023.  He does not necessarily feel any different on all of this vitamin supplementation,  even after IV iron.  10/14/21 Dr. Franco an EGD and colonoscopy with normal colonoscopy, EGD with LA grade B reflux esophagitis without bleeding, 3 cm hiatal hernia, multiple Sachin's ulcers, acute gastritis; pathology showed unremarkable duodenal biopsy,  stomach with reactive gastropathy but negative H. pylori; patient started on the PPI with no change in any of his symptoms.  7/18/2022 he met with gastroenterology provider Charito who  recommended continuing PPI, no endoscopy because symptoms and labs  better, referred for a weight loss specialist; he saw provider Charito again 1/16/2023 noting no endoscopy due to no anemia.       Since 2015 patient has been on a gluten-free diet because of a history of multiple food allergies including gluten, never officially diagnosed with celiac disease.  His previous symptoms of headache, dizziness, presyncope have significantly improved  although he still has intermittent mild headaches but no more dizziness/presyncope.  8/15/2022 he was having intermittent mild abdominal pain-still happens rarely, stable.           Patient lives with his parents.  He is out of bed all day and able to do chores.  Patient is by himself today, previously was accompanied by his mother.     7/17/2023 he again declined the flu vaccination.  5/2021 patient received his second Covid vaccination , booster 5/2022.  11/6/2023 I recommended ongoing vaccine management through PCP.  I have reviewed the available laboratory data, radiographic data, medications, and notes, and have summarized them in this note  on 11/6/2023.  No other hospitalizations, major illness, or procedures since his last visit on 7/17/2023.     Epic transition occurred 10/2/2023 from prior EMR system.  I did not go back in the epic system prior to this point unless patient brought up an issue.  I did review the EMR data in epic from 10/2/2023 going forward, but relied on our old EMR system for data prior to the transition.     No fevers,  unintentional weight loss, sore throat, acute vision changes, chest pain, edema, shortness breath, cough, nausea or vomiting  now, abnormal bowel movements now,  blood in stool, dysuria or hematuria,  bone/back/muscle/joint pain except as above, numbness, focal weakness, tingling, rashes or lumps, abnormal bruising or bleeding, diabetes  or thyroid disorder although elevated glucose on labs, anxiety or depression.      Hematology-oncology history (reviewed and updated 11/6/2023)  -7/2015:  WBC 6.8, hemoglobin 15.9, MCV 85, platelets 241, normal differential number.  -6/2021: WBC 7.4-10.3, hemoglobin 4.4->8.8, MCV 68->80, platelets 200s-514, normal differential number except elevated neutrophils and lymphocytes low.  Blood type A+.  Normal amylase, BNP, CK, drug screen, lactate, lipase, troponin, TSH, antibody screen,  urine analysis.  Peripheral blood smear with hypochromic microcytic anemia suggesting iron deficiency, few ovalocytes, no schistocytes.  Glucose 122-124, sodium 134-137, potassium 3.5-3.9, creatinine 0.8-0.9, normal calcium and LFTs.  Negative Covid.   Ferritin less than 8 up to 19, iron 24-35%, TIBC elevated.  B12 259.  Normal lipid panel except HDL 36.  Magnesium 1.9.  Negative stool occult.  Reticulocyte count 1.5 subsequently 4.7.  Vitamin D was low at 17.  -6/14/2021 chest x-ray showed small hiatal hernia, no acute process  -6/15/2021 CT abdomen and pelvis with contrast showed subcentimeter paraesophageal lymph nodes, moderate to large hiatal hernia, normal liver, splenomegaly 15.2 cm, small umbilical  hernia, asymmetric left lateral wall thickening (listed under bowel but unclear what exact area) could be related to under distention but consider GI evaluation and/or esophagram/upper GI study, bladder wall thickening favored to be related to under distention  recommend correlate with symptoms (was asymptomatic with urine analysis unremarkable 6/2021).  -I initially saw the patient on 8/3/2021 regarding anemia and abnormal CT, data outlined above.  PCP did blood work 6/2021 as pt was having a cough with chest congestion and fatigue since around October 2020, near syncope since around March 2021, labs  showed hemoglobin 4s, was sent to the ER. Patient was admitted 6/15/2021 for 1 day with generalized weakness and lightheadedness with a hemoglobin of 4s, noted to have severe iron deficiency anemia, was given 3  units packed red blood cells, discharged  on ferrous sulfate 3 times a day, recommended outpatient endoscopy.  6/2021 PCP started patient on B12 injections.  Patient denied any prior history of anemia before 6/2021.  He did eat red meat.   He denied any bleeding, pica, blood donation, or prior endoscopy.  His only transfusion was 6/2021.  He started on a PPI 6/2021.  He had never previously taken iron, B12, or folate.  He had been on a gluten-free diet since 2015 due to multiple food allergies  including gluten but was never diagnosed with celiac disease officially.  No prior bowel surgery or gastric bypass.   -8/4/2021: WBC 7.2, hemoglobin 13.5, MCV 82, platelet 287, ANC 4.41, normal differential number.  Iron 8% with ferritin 22.  Reticulocyte count 1.5%.  Folic acid 3.0-supplement prescribed.   Negative/normal intrinsic factor antibody, parietal cell antibody, rheumatoid factor, HIV, hepatitis panel, KELLY, celiac panel, lead, peripheral blood flow cytometry, SPEP, Jai 2 V6 17F.  Normal B6 at 37, B1 at 127, copper at 92, methylmalonic acid 92,  zinc 59, LDH. 8/3/2021 iron was low but hemoglobin normal so was absorbing some of his iron, deferred IV iron but considered if labs not improved, maintained on oral iron, no issues taking oral iron.  -9/21/2021 he saw a gastrointestinal physician assistant Shanda reed EGD and colonoscopy with a gluten trial prior to this  -10/4/2021: WBC 8.9, hemoglobin 13.5, MCV 83, platelet 313, eosinophils 0.86 elevated. Iron 6% with ferritin 14. Folic acid normal 8.9. Patient was offered IV iron but declined.  -10/14/21 Dr. Franco an EGD and colonoscopy with normal colonoscopy, EGD with LA grade B reflux esophagitis without bleeding, 3 cm hiatal hernia, multiple Sachin's ulcers, acute gastritis; pathology showed unremarkable duodenal biopsy, stomach with reactive  gastropathy but negative H. pylori  -11/8/2021: WBC 7.8, hemoglobin 12.9, MCV 79, platelet 288, ANC 4.5. Eosinophils  normal 0.69. Patient was offered IV iron but declined, increased iron to 2/day.  -12.7.21: wbc 7.9, hg 14.0, mcv 81, plt 245, nl diff #. iron 39%, ferritin 18. FA 20. B12 elevated.  -1/2022 Feraheme 510 mg x 2 doses were given due to persistent iron deficiency despite PO iron.    (IV iron was offered previously but he declined but eventually was agreeable).  -2/7/2022: WBC 8, hemoglobin 15.7, hematocrit 45, MCV 83, platelet 219.  Iron 43% with ferritin normal at 280.  -5/2/2022: Normal CBC with WBC 8.6, hemoglobin 16.8, hematocrit 46, MCV 88, platelet 225, normal differential number.  Iron 26% and ferritin 190 both normal.  Folate 14.9.  B12 elevated.  -8/15/2022 he was still taking iron 1 tablet/day with no issues, subsequently discontinued as it was likely ineffective   -8/15/2022: WBC 8.9, hemoglobin 15.3, MCV 8089, platelet 184, eosinophils 0.7 observed.  Erythropoietin mildly elevated 32.  Iron 19% with ferritin 171, observed.  Negative myeloid next generation sequencing panel including JAK2 V6 17F, JAK2 exon 12,  ANN R, MPL.  -Hemoglobin was at the upper limit of normal various times in 2022, erythropoietin was slightly elevated so referred to Dr. Valenzuela  who saw him 9/19/2022 with no signs or symptoms of a pulmonary disorder or sleep apnea, follow-up as needed.   -He admitted to running out of his folic acid 9/2022, refilled 12/5/2022.   -Patient was noncompliant with his B12 injection in November 2022, resume December 2022.   -12/5/2023: WBC 8.2, hemoglobin 13.8, MCV 85, platelet 299, eosinophil 0.7, erythropoietin elevated 90, iron 8% with ferritin 15 with 2 doses of Feraheme given December 2022 .  Folate 7.9 recently out of his folate.  Elevated B12.  Normal testosterone 484.   -12/26/2022 CT chest, abdomen, pelvis with contrast given the elevated erythropoietin to check for malignancy showed few tiny calcified granulomas in the lungs, hepatomegaly 20 cm with diffuse fatty infiltration, splenomegaly  15 cm, large hiatal hernia   -3/13/2023: WBC 7.1, hemoglobin 9.9, MCV 76, platelet 244, normal differential number.  Iron low, ferritin 12.  2 doses of Feraheme given .   -4/3/2023: WBC 7.8, hemoglobin 11.7, MCV 79, platelet 313, normal differential number.  Iron 8% with ferritin 109, 2 doses of Feraheme given .   -4/26/2023: WBC 7.1, hemoglobin 13.5, MCV 80, platelet 272, iron 22% with ferritin 220.  -Dr. Pfeiffer with hepatology saw him 5/19/2023 with labs and FibroScan ordered along with weight loss program, likely NAFLD  -7/17/2023: WBC 7.1, hemoglobin 10.1, MCV 76, platelet 255, normal differential number.  Erythropoietin elevated 264 felt secondary to iron deficiency.  Normal PT.  ALT 79, AST 51.  Ferritin 13.  Folate 8.8.  Elevated B12.  Normal ceruloplasmin and A1 AT.  Negative hepatitis B core antibody.  Positive hepatitis A antibody total.  -Feraheme 2 doses given July 2023.  -9/18/2023: WBC 8.4, hemoglobin 10.3, MCV 76, platelet 288.  Ferritin 10.  Folate 8.9.  B12 632.  -9/2023 Feraheme was ordered but after 2 IV attempts patient refused.  -10/27/2023 Dr. Pfeiffer performed a colonoscopy showing retained stool with prior colonoscopy normal and same-day EGD with source of bleeding so likely not necessary to redo, EGD with 6 cm hiatal hernia with Sachin's lesions, normal-appearing duodenum with biopsy pending; felt iron deficiency was secondary to hiatal hernia with Sachin's erosions, appointment with Dr. Hyatt general surgery 12/4/2023.     Past medical history  -Anemia and abnormal CT with a splenomegaly and bowel thickening as above, iron and B1 2 deficiency as above   -Hepatosplenomegaly on imaging 12/26/2022. Dr. Pfeiffer with hepatology who saw him 5/19/2023 with labs and FibroScan ordered along with weight loss program, likely NAFLD.   -4/17/2022 he was in the emergency room with nausea, vomiting, diarrhea, was given Zofran and Lomotil, symptoms resolved quickly, negative COVID per patient, was told he  "had a viral gastroenteritis  -Endoscopy 10/2021 as above  -Folic acid deficiency 8/2021  -Obesity  -Elevated glucose  -Dyslipidemia mentioned in the chart but patient denied  -Hiatal hernia with GERD diagnosed 6/2021  -Vitamin D deficiency  - multiple food allergies including gluten on a gluten-free diet since 2015  -Chondromalacia of the knee (\"runner's knee\") status post surgery  -6/2021 hyponatremia down to 134  -Abnormal LFTs: 4/2014 ALT 67 with normal AST, 7/2015 elevated AST 65 and , 6/2021 normal LFTs .  8/3/2021 patient did not recall the details surrounding the time of his abnormal LFTs.     Past surgical history  Left knee arthroscopy, dental extractions,  endoscopy 10/2021 and additional as above     Family history  No blood disorders or cancers in first-degree relatives.     Social history  No tobacco, alcohol, or drugs.  No children.    Allergies   Allergen Reactions    Gluten Anaphylaxis    Cod Liver Oil Other    Corn Other    Egg Other    Milk Containing Products (Dairy) Other    Peanut Other    [place] Other       Current Outpatient Medications on File Prior to Visit   Medication Sig Dispense Refill    cyanocobalamin (Vitamin B-12) 1,000 mcg/mL injection every 30 (thirty) days.      EPINEPHrine (EpiPen 2-Adam) 0.3 mg/0.3 mL injection syringe USE AS DIRECTED IN CASE OF A SEVERE ALLERGIC REACTION      folic acid (Folvite) 1 mg tablet Take 1 tablet (1 mg) by mouth once daily.      omeprazole (PriLOSEC) 40 mg DR capsule Take 1 capsule (40 mg) by mouth once daily.       No current facility-administered medications on file prior to visit.       Vitals:    11/06/23 1331   BP: 151/88   Pulse:    Resp:    Temp:    SpO2:         Physical Exam:      Constitutional: Performance status 0.   164  kg.  -General: Well-developed and well-nourished white man. No acute distress.  Elevated BMI noted.    -Lymphatics: No cervical or supraclavicular lymphadenopathy.  -Eyes: Wears glasses.  Anicteric.  -HEENT: No " visible oral lesions.  Neck supple.  -Cardiovascular: Regular rate and rhythm.    -Respiratory: Clear to auscultation bilaterally. Breathing is nonlabored.  -Abdomen: Soft, nontender, significantly limited by body habitus.  -Back: No costovertebral angle tenderness. No spine tenderness.  -Extremities: No significant leg edema.  -Neurologic: AAO.  MAEW. Speech fluent with normal content.    -Skin: Warm and dry distally.  -Psychiatric: Somewhat quiet-stable.      Assessment and Plan:   Assessment:    #Severe  iron deficiency anemia with microcytosis and thrombocytosis 6/2021 (6/2021 ferritin less than 8, hemoglobin 4s) with negative stool occult/urine analysis for blood and no visible bleeding,  6/2021 status post 3 units packed red blood cells and started on ferrous sulfate, 6/2021 reticulocyte count low but subsequently elevated possibly due to starting iron, normal CBC 7/2015, no prior  history of anemia prior to 6/2021.  6/2021 patient presented with symptoms of cough, fatigue, near syncope.  No evidence of hemolysis with normal bilirubin.  B12 was only 259 , 6/2021 started on B12 injections from PCP; I would have expected his   MCV to be elevated with the B12 deficiency but his MCV was low likely  due to the iron deficiency, can see a mixed picture.  8/4/2021 hemoglobin normalized along with MCV, iron still low but hemoglobin normal so he was likely absorbing some and maintained on oral  iron, also diagnosed with folate deficiency and he was started on supplement; 8/4/2021 normal/negative B1/B6/copper/zinc, LDH, methylmalonic  acid, JAK2, rheumatoid factor, HIV, hepatitis, KELLY, celiac, lead, flow cytometry, SPEP, intrinsic factor antibody, parietal cell antibody.  10/14/21 EGD and colonoscopy (done with a gluten challenge with negative duodenal biopsy, so he did not appear to have clinical celiac disease) with normal colonoscopy, EGD with LA grade B reflux esophagitis without bleeding, 3 cm hiatal hernia,   multiple Sachin's ulcers, acute gastritis; pathology showed unremarkable duodenal biopsy, stomach with reactive gastropathy but negative H. pylori; therefore, he could have a  combination of malabsorption (was started on a PPI 6/2021 but that was after he presented with anemia) as well as blood loss from the Sachin's ulcers.  December 2022 iron studies were persistently  low and he eventually agreed to Feraheme which was given 1/2022, and actually February 2022 iron studies normalized with hemoglobin up at the upper limit of normal.  Gastroenterology recommended no further endoscopy 7/18/2022 .     8/2022 he discontinued his oral iron given likely ineffectiveness.  Despite being out of his folate his level was decent 12/2022, subsequently restarted back on his folate.  Iron deficiency again receiving IV iron multiple times December 2022-April 2023  with hemoglobin down to 9.9 and MCV down to 76 in March 2023 consistent with severe iron deficiency, no visible bleeding.  CBC and iron studies improved after IV iron April 2023.  July 2023 hemoglobin down to 10.1 with clear iron deficiency status post IV iron again, still iron deficient September 2023 but he refused IV iron after unsuccessful IV attempts x2. 10/27/2023 Dr. Pfeiffer performed a colonoscopy showing retained stool with prior colonoscopy normal and same-day EGD with source of bleeding so likely not necessary to redo, EGD with 6 cm hiatal hernia with Sachin's lesions, normal-appearing duodenum with biopsy pending; felt iron deficiency was secondary to hiatal hernia with Sachin's erosions, appointment with Dr. Hyatt general surgery 12/4/2023.  Erythropoietin level was elevated 7/2023 likely due to iron deficiency.  Normal B12 and folate most recently September 2023.  NEGATIVE HEPATITIS PANEL 8/2021.      #Borderline polycythemia discovered once iron studies collected to 16.8 on 5/2/2022 with no known history of any cardiopulmonary issues or sleep apnea but  does have the body habitus predisposing apnea.  Negative myeloid next generation sequencing panel  8/2022 with erythropoietin slightly elevated at 32 likely secondary polycythemia, seen by pulmonary 9/2022 with no evidence of a pulmonary disorder.  Erythropoietin elevated up to 90 December 2022 with CT chest/abdomen/pelvis done to check for malignancy  which was unremarkable except for hepatosplenomegaly.    Normal testosterone 12/2023.  July 2023 anemic with elevated erythropoietin likely due to the iron deficiency.     #Mild eosinophilia 10/2021 subsequently within normal limits 11/2021-12/2021, again mildly elevated at 0.7 in August 2022 and 12/2022 but normal on most recent labs.  Consider further evaluation depending on trend.     #Iron, folate, and B12 deficiency as above     #Thrombocytosis likely secondary to iron deficiency June 2021 with most platelet count normal 6/23/2021.   Negative myeloid next generation sequencing panel 8/2022.  Platelet count normal on most  recent labs.     #Mild splenomegaly on imaging 15.2 cm by CT 6/2021, could be due to possible fatty liver although not mentioned on imaging but does have significant obesity, mild upper abdominal pain seemed unlikely  related to the mild splenomegaly especially as it seemed better in 2022.  Negative myeloid next generation sequencing panel 8/2022.   Gastroenterology recommended no further evaluation of this 7/18/2022.  Hepatosplenomegaly on imaging 12/2022.  Dr. Pfeiffer with hepatology saw him 5/19/2023 with labs and FibroScan ordered along with weight loss program, likely NAFLD-he has not done any  of this testing yet except for labs January 2023 with persistently elevated transaminases, normal ceruloplasmin and A1 AT, negative hepatitis B core antibody, positive hepatitis A antibody total.     #6/2021 imaging with subcentimeter paraesophageal lymph nodes of questionable significance.  No significant adenopathy mention on CT 12/2022.      #Obesity, elevated glucose, hiatal hernia with GERD, vitamin D deficiency, multiple food allergies, 6/2021 hyponatremia     #History of abnormal LFTs in 1771-0621 but normal 6/2021.  Abnormal LFTs 7/2023 as above.    #Elevated blood pressure     -I requested blood pressure to be repeated and notify PCP or see a provider if still elevated.  -Await pathology from 10/27/2023.  -I recommended follow-up with hepatology's recommendations and a follow-up appointment.  -I recommended ongoing follow-up with provider Charito given blood loss anemia.   -7/17/2023 he reported PCP departed the institution so I asked a list of new PCP providers to be given to patient-11/6/2023 patient said he never got the list so I requested this to be given again.  -CBC and iron studies ordered although I would expect these to be low as he did not receive IV iron when his iron levels were low 9/2023.  B12 and folate were okay 9/2023, consider next visit.  Follow-up erythropoietin as needed.  -Feraheme 510 mg x 2 doses ordered as I expect him to be iron deficient as above.  Given his issues with IV access, I offered him a Mediport and he initially said he never got any information about that, but as I was leaving he said he did get information, requested the nurse to go over this with him again.  He will let me know if he wants a port, but for now he will try to request a certain nurse or nurses to tend to get his IV better than others, also told him he could try a another infusion site.  -Follow-up imaging could be considered.  -Side effects of IV iron were outlined in my note from 10/4/2021, oral iron likely not effective,  caution with oversupplementation given his tendency towards polycythemia although not really an issue lately.      -Folic acid 1 mg daily #30 with 11 refills was refilled 12/5/22, follow-up levels.  -Continue B12 injections at my office 1000 mcg IM monthly  at my office with follow-up levels.   -Pulmonary 2022 decided no  sleep apnea testing.    -Await appointment with surgery for his Sachin's erosions as well as for bariatric surgery.  - Other things to consider would be other myeloma labs, bone marrow biopsy, H. pylori, , PT/PTT, fibrinogen, other hemolysis labs, follow-up imaging of the spleen, [facility] evaluation .  - Labs are incorporated in my note which is to be sent to PCP. I have recommended routine health care maintenance and screening through  PCP. The patient was reminded to followup with the PCP for other medical problems and ongoing care. The patient was asked to return to clinic, or sooner as needed for new or concerning symptoms, in  4 months, sooner for iron.  I can await his follow-up labs to see if he needs additional labs after his next IV iron.  11/6/2023 I explained my upcoming departure from [facility], explained that I was told that [facility] is working on my replacement, notified patient to make sure appointments/orders are scheduled and kept as directed.  I wished the patient the best of luck.

## 2023-11-13 ENCOUNTER — INFUSION (OUTPATIENT)
Dept: HEMATOLOGY/ONCOLOGY | Facility: HOSPITAL | Age: 28
End: 2023-11-13
Payer: COMMERCIAL

## 2023-11-13 VITALS
DIASTOLIC BLOOD PRESSURE: 69 MMHG | RESPIRATION RATE: 16 BRPM | TEMPERATURE: 97.3 F | SYSTOLIC BLOOD PRESSURE: 106 MMHG | OXYGEN SATURATION: 96 % | HEART RATE: 85 BPM

## 2023-11-13 DIAGNOSIS — Z86.2 H/O IRON DEFICIENCY ANEMIA: ICD-10-CM

## 2023-11-13 DIAGNOSIS — K90.9 IRON MALABSORPTION (HHS-HCC): ICD-10-CM

## 2023-11-13 PROCEDURE — 2500000004 HC RX 250 GENERAL PHARMACY W/ HCPCS (ALT 636 FOR OP/ED): Mod: SE | Performed by: INTERNAL MEDICINE

## 2023-11-13 PROCEDURE — 96365 THER/PROPH/DIAG IV INF INIT: CPT | Mod: INF

## 2023-11-13 RX ORDER — FAMOTIDINE 10 MG/ML
20 INJECTION INTRAVENOUS ONCE AS NEEDED
Status: CANCELLED | OUTPATIENT
Start: 2023-11-20

## 2023-11-13 RX ORDER — EPINEPHRINE 0.3 MG/.3ML
0.3 INJECTION SUBCUTANEOUS EVERY 5 MIN PRN
Status: DISCONTINUED | OUTPATIENT
Start: 2023-11-13 | End: 2023-11-13 | Stop reason: HOSPADM

## 2023-11-13 RX ORDER — ALBUTEROL SULFATE 0.83 MG/ML
3 SOLUTION RESPIRATORY (INHALATION) AS NEEDED
Status: CANCELLED | OUTPATIENT
Start: 2023-11-20

## 2023-11-13 RX ORDER — EPINEPHRINE 0.3 MG/.3ML
0.3 INJECTION SUBCUTANEOUS EVERY 5 MIN PRN
Status: CANCELLED | OUTPATIENT
Start: 2023-11-20

## 2023-11-13 RX ORDER — FAMOTIDINE 10 MG/ML
20 INJECTION INTRAVENOUS ONCE AS NEEDED
Status: DISCONTINUED | OUTPATIENT
Start: 2023-11-13 | End: 2023-11-13 | Stop reason: HOSPADM

## 2023-11-13 RX ORDER — DIPHENHYDRAMINE HYDROCHLORIDE 50 MG/ML
50 INJECTION INTRAMUSCULAR; INTRAVENOUS AS NEEDED
Status: CANCELLED | OUTPATIENT
Start: 2023-11-20

## 2023-11-13 RX ORDER — ALBUTEROL SULFATE 0.83 MG/ML
3 SOLUTION RESPIRATORY (INHALATION) AS NEEDED
Status: DISCONTINUED | OUTPATIENT
Start: 2023-11-13 | End: 2023-11-13 | Stop reason: HOSPADM

## 2023-11-13 RX ORDER — DIPHENHYDRAMINE HYDROCHLORIDE 50 MG/ML
50 INJECTION INTRAMUSCULAR; INTRAVENOUS AS NEEDED
Status: DISCONTINUED | OUTPATIENT
Start: 2023-11-13 | End: 2023-11-13 | Stop reason: HOSPADM

## 2023-11-13 RX ADMIN — FERUMOXYTOL 510 MG: 510 INJECTION INTRAVENOUS at 14:06

## 2023-11-13 ASSESSMENT — PAIN SCALES - GENERAL: PAINLEVEL: 0-NO PAIN

## 2023-11-13 ASSESSMENT — PATIENT HEALTH QUESTIONNAIRE - PHQ9
SUM OF ALL RESPONSES TO PHQ9 QUESTIONS 1 & 2: 0
1. LITTLE INTEREST OR PLEASURE IN DOING THINGS: NOT AT ALL
2. FEELING DOWN, DEPRESSED OR HOPELESS: NOT AT ALL

## 2023-11-13 ASSESSMENT — ENCOUNTER SYMPTOMS
LOSS OF SENSATION IN FEET: 0
DEPRESSION: 0
OCCASIONAL FEELINGS OF UNSTEADINESS: 0

## 2023-11-16 LAB
LABORATORY COMMENT REPORT: NORMAL
PATH REPORT.FINAL DX SPEC: NORMAL
PATH REPORT.GROSS SPEC: NORMAL
PATH REPORT.TOTAL CANCER: NORMAL

## 2023-11-20 ENCOUNTER — INFUSION (OUTPATIENT)
Dept: HEMATOLOGY/ONCOLOGY | Facility: HOSPITAL | Age: 28
End: 2023-11-20
Payer: COMMERCIAL

## 2023-11-20 ENCOUNTER — TELEPHONE (OUTPATIENT)
Dept: HEMATOLOGY/ONCOLOGY | Facility: HOSPITAL | Age: 28
End: 2023-11-20
Payer: COMMERCIAL

## 2023-11-20 VITALS
TEMPERATURE: 97.5 F | RESPIRATION RATE: 16 BRPM | OXYGEN SATURATION: 96 % | SYSTOLIC BLOOD PRESSURE: 146 MMHG | DIASTOLIC BLOOD PRESSURE: 76 MMHG | HEART RATE: 81 BPM

## 2023-11-20 DIAGNOSIS — Z86.2 H/O IRON DEFICIENCY ANEMIA: ICD-10-CM

## 2023-11-20 DIAGNOSIS — K90.9 IRON MALABSORPTION (HHS-HCC): ICD-10-CM

## 2023-11-20 PROCEDURE — 96365 THER/PROPH/DIAG IV INF INIT: CPT | Mod: INF

## 2023-11-20 PROCEDURE — 2500000004 HC RX 250 GENERAL PHARMACY W/ HCPCS (ALT 636 FOR OP/ED): Mod: SE | Performed by: INTERNAL MEDICINE

## 2023-11-20 RX ORDER — HEPARIN SODIUM,PORCINE/PF 10 UNIT/ML
50 SYRINGE (ML) INTRAVENOUS AS NEEDED
Status: CANCELLED | OUTPATIENT
Start: 2023-11-20

## 2023-11-20 RX ORDER — FAMOTIDINE 10 MG/ML
20 INJECTION INTRAVENOUS ONCE AS NEEDED
Status: CANCELLED | OUTPATIENT
Start: 2023-11-20

## 2023-11-20 RX ORDER — EPINEPHRINE 0.3 MG/.3ML
0.3 INJECTION SUBCUTANEOUS EVERY 5 MIN PRN
Status: CANCELLED | OUTPATIENT
Start: 2023-11-20

## 2023-11-20 RX ORDER — DIPHENHYDRAMINE HYDROCHLORIDE 50 MG/ML
50 INJECTION INTRAMUSCULAR; INTRAVENOUS AS NEEDED
Status: CANCELLED | OUTPATIENT
Start: 2023-11-20

## 2023-11-20 RX ORDER — ALBUTEROL SULFATE 0.83 MG/ML
3 SOLUTION RESPIRATORY (INHALATION) AS NEEDED
Status: DISCONTINUED | OUTPATIENT
Start: 2023-11-20 | End: 2023-11-20 | Stop reason: HOSPADM

## 2023-11-20 RX ORDER — EPINEPHRINE 0.3 MG/.3ML
0.3 INJECTION SUBCUTANEOUS EVERY 5 MIN PRN
Status: DISCONTINUED | OUTPATIENT
Start: 2023-11-20 | End: 2023-11-20 | Stop reason: HOSPADM

## 2023-11-20 RX ORDER — FAMOTIDINE 10 MG/ML
20 INJECTION INTRAVENOUS ONCE AS NEEDED
Status: DISCONTINUED | OUTPATIENT
Start: 2023-11-20 | End: 2023-11-20 | Stop reason: HOSPADM

## 2023-11-20 RX ORDER — DIPHENHYDRAMINE HYDROCHLORIDE 50 MG/ML
50 INJECTION INTRAMUSCULAR; INTRAVENOUS AS NEEDED
Status: DISCONTINUED | OUTPATIENT
Start: 2023-11-20 | End: 2023-11-20 | Stop reason: HOSPADM

## 2023-11-20 RX ORDER — HEPARIN 100 UNIT/ML
500 SYRINGE INTRAVENOUS AS NEEDED
Status: CANCELLED | OUTPATIENT
Start: 2023-11-20

## 2023-11-20 RX ORDER — ALBUTEROL SULFATE 0.83 MG/ML
3 SOLUTION RESPIRATORY (INHALATION) AS NEEDED
Status: CANCELLED | OUTPATIENT
Start: 2023-11-20

## 2023-11-20 RX ADMIN — FERUMOXYTOL 510 MG: 510 INJECTION INTRAVENOUS at 13:53

## 2023-11-20 ASSESSMENT — PAIN SCALES - GENERAL: PAINLEVEL: 0-NO PAIN

## 2023-11-20 NOTE — TELEPHONE ENCOUNTER
Labs ordered in 1 month, to follow-up after he receives the IV iron.  Please notify patient to have the blood work done. Per Dr. Govea staff message.          Spoke with patient during clinic today. Notified he needs to have lab work drawn on 12/6/23.

## 2023-11-24 PROBLEM — E66.01 MORBID OBESITY WITH BMI OF 45.0-49.9, ADULT (MULTI): Status: ACTIVE | Noted: 2023-11-24

## 2023-11-24 PROBLEM — Z98.84 BARIATRIC SURGERY STATUS: Status: ACTIVE | Noted: 2023-11-24

## 2023-11-24 PROBLEM — Z13.21 ENCOUNTER FOR VITAMIN DEFICIENCY SCREENING: Status: ACTIVE | Noted: 2023-11-24

## 2023-11-24 PROBLEM — Z01.818 PREOPERATIVE CLEARANCE: Status: ACTIVE | Noted: 2023-11-24

## 2023-11-24 NOTE — PATIENT INSTRUCTIONS
Taking ppi daily to help heal the indira's ulcers  Keep a food diary and aim for 60 g of protein daily  Exercise daily.   Consult Nutrition for education and MSWL  Consult Psychology  Consult Physical Therapy for limited mobility  Consult cardiology  Consult pulmonology  Labs/CXR/EKG ordered  EGD  PCP for medical optimization  Consult sleep medicine - concern for KRISH    The following are some lifestyle changes you should begin to prepare you for your hiatal hernia repair and weight loss surgery.   Eliminate soda and other carbonated beverages from your diet. Carbonation will not be well tolerated after surgery. Try Propel, Vitamin Water Zero, Sobe Lifewater, Crystal Light or water.    Increase fluid consumption to 64 oz daily. Do not drink within 30 minutes of eating as this will liquefy your food and make you hungry more quickly.    Exercise for 30-60 minutes daily. Brisk walking, bike riding and swimming are all examples of healthy exercise. If you are unable to exercise we recommend seated exercise.    Do not skip meals.    Take a multivitamin daily.    Lose weight. In preparation for your surgery it is important that you begin making healthier food choices now. Our dietitian will meet with you to help you select foods lower in calories and   higher in nutrition. We would like you to lose at least 10  lbs prior to surgery.     Increase your protein intake to 60 grams per day.    Alcohol is empty calories. Please eliminate while preparing for surgery.    STOP Vaping/Smoking Nicotine or Marijuana IMMEDIATELY.  Plan your meals.      General Instruction:   1) Use the information we gave you today to work through your insurance requirements and medical clearances.   2) These documents need to get faxed to the program navigators so they can submit them for approval from your insurance company.   3) Obtain labs today at a  facility. We will call you with any abnormalities and corrections you need to make.   4)  Continue to work with your primary care doctor and other specialist so your other health problems are well controlled prior to your surgery.   5) Adopt the recommendations of the program dietician so you develop healthy eating patterns.   6) Work with the sleep team to get your sleep apnea treated to prevent other health problems .   7) Consider attending a support group to learn from other who have been through the process.   8) Come to the MSWL sessions.         Dr. Dasha Hyatt M.D., MPH  Director of Bariatric & Minimally Invasive Surgery  [place] [address]  T:  [phone]  F:  [phone]     Bianka Roche, RN Assistant Nurse Manager, Care Coordinator - Bariatric Surgery [facility] Patient Nursing Contact  T:  [phone]  F:  [phone]    Ivette Monroe, Patient Navigator - Bariatric Surgery [facility] Patient Clearance Questions & Tracking Contact  T:  [phone]  F:  [phone]

## 2023-11-24 NOTE — PROGRESS NOTES
BARIATRIC SURGERY CLINIC  BARIATRIC NEW PATIENT/INITIAL VISIT NOTE    Clinic Date: 11/24/2023    Melvin Foley, 28 y.o.  MRN: [id number]    Initial weight:   Wt Readings from Last 1 Encounters:   11/06/23 (!) 164 kg (362 lb 8.7 oz)    Body mass index is 47.12 kg/m².     Surgeon: Dasha Hyatt MD    PRESENTING TODAY for consideration of Bariatric Surgical Weight Loss is:  A 28 y.o. male with morbid obesity (Body mass index is 47.12 kg/m².), reporting having attempted and failed multiple diet and exercise regimens for weight loss. Initial Onset of obesity was last 10 years. The verbalized goal for surgery is to:  be healthier . Verbalizes has tried multiple diets to lose weight including:   No dairy or gluten, low calorie/carbs . Verbalizes was most successful with the:   None . The most pounds lost on this diet were:  a few lbs. LBS.  Verbalizes considers their dietary weakness to be:   Uncertain .  Verbalizes a  highest weight ever of 367 pounds and lowest weight ever of 240 pounds Diet Problems:  Intolerance diary & gluten  }  Verbalizes does not exercise  0 Minutes/Day Types of Exercise : None  Lowest was 240.  He has intolerance to gluten and dairy.  Dx with PEH in 2021 and does not exercise as told not to.  He notes some dysphagia with solids.  No pain or regurg or burping.    He was holding off on exercise because of PEH. He feels like sometimes he has food that gets stuck in his throat but not below that. Takes omeprazole PRN at least once a week.   Only takes with symptoms.  PEH found when he was anemic to 4.8.  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  Colonoscopy was normal.  Got iron and hgb now at 10-11.  Recent egd/colon by Dr. Pfeiffer.  EGD showed camerons lesions and 6 cm hiatal hernia.  Colon aborted due to poor prep.  Had a CT in 2021.   He is also noted to have splenomegaly.  No heme workup.  Heme onc advised omeprazole and then he stopped.  Told anemia also attributed to malabsorption.    Notes does not matter what he does but his weight keeps going up.  Has obesity in family.  Uncle and cousin have diabetes.   He does not want surgery as his insurance will cover but can't get time off of work.  He is not sure what to do.        Diet recall: french fries yesterday.  This is one of only things he is not allergic to.    Can't have gluten, corn, soy, peanuts, walnuts, fish (shrimp is ok), sesame seeds, no dairy and no eggs    Chicken is ok, beef is ok, legumes are ok.  Not sure about cottage cheese. Can do all veggies except corn.      Diet recall: vegetables and meat. Not sure of the portion. Mom says he is very healthy and does not think he eats poorly to justify the weight gain.  Does not have dessert/sweets often.     No smoking, no alcohol, no drugs  No nsaids    COMORBIDITIES:  No problem-specific Assessment & Plan notes found for this encounter.    Learn about options      GERD - Health Related Quality of Life Questionnaire (GERD- HRQL)  On PPI  Omeprazole    How bad is the heartburn? 0 = No symptoms  Heartburn when lying down? 0 = No symptoms  Heartburn when standing up? 0 = No symptoms  Heartburn after meals? 0 = No symptoms  Does heartburn change your diet? 0 = No symptoms  Does heartburn wake you from sleep? 0 = No symptoms    Do you have difficulty swallowing? 0 = No symptoms  Do you have pain with swallowing? 0 = No symptoms  If you take medication, does this affect your daily life? 1 = Symptoms noticeable but not bothersome    How bad is the regurgitation? 0 = No symptoms  Regurgitation when lying down? 0 = No symptoms  Regurgitation when standing up? 0 = No symptoms  Regurgitation after meals? 0 = No symptoms  Does regurgitation change your diet? 0 = No symptoms  Does regurgitation wake you from sleep? 0 = No symptoms    How satisfied are you with your present condition? Neutral    Total score (calculated by summing the individual scores of questions 1-15): 0   Greatest possible score  75 (worst symptoms).   Lowest possible score 0 (no symptoms).    Heartburn score (calculated by summing the individual scores of questions 1-6): 0   Worst heartburn symptoms: 30.   No heartburn symptoms: 0.   Score less than or equal to 12 with each individual question not exceeding 2 indicate heartburn elimination.    Regurgitation score (calculated by summing the individual scores of questions 10-15): 0   Worst regurgitation symptoms: 30.   No regurgitation symptoms: 0.   Score less than or equal to 12 with each individual question not exceeding 2 indicate regurgitation elimination.    PSH:   Past Surgical History:   Procedure Laterality Date    COLONOSCOPY      DENTAL SURGERY      OTHER SURGICAL HISTORY  06/10/2021    Knee surgery    UPPER GASTROINTESTINAL ENDOSCOPY          FAMILY HISTORY:  Family History   Problem Relation Name Age of Onset    Arthritis Mother      Depression Mother      Hypertension Father          SOCIAL HISTORY:  Social History     Tobacco Use    Smoking status: Never     Passive exposure: Never    Smokeless tobacco: Never   Vaping Use    Vaping Use: Never used   Substance Use Topics    Alcohol use: Never    Drug use: Never       MEDICATIONS:  Prior to Admission Medications:  Medication Documentation Review Audit       Reviewed by Christiana Woodward RN (Registered Nurse) on 11/20/23 at 1329      Medication Order Taking? Sig Documenting Provider Last Dose Status   cyanocobalamin (Vitamin B-12) 1,000 mcg/mL injection [id number] No every 30 (thirty) days. Historical Provider, MD Taking Active   EPINEPHrine (EpiPen 2-Adam) 0.3 mg/0.3 mL injection syringe [id number] No USE AS DIRECTED IN CASE OF A SEVERE ALLERGIC REACTION Historical MD Pineda Taking Active   folic acid (Folvite) 1 mg tablet [id number] No Take 1 tablet (1 mg) by mouth once daily. Janett Sung MD Taking Active   omeprazole (PriLOSEC) 40 mg DR capsule [id number] No Take 1 capsule (40 mg) by mouth once daily. Janett Sung MD  Taking Active                     ALLERGIES:  Allergies   Allergen Reactions    Gluten Anaphylaxis    Cod Liver Oil Other    Corn Other    Egg Other    Milk Containing Products (Dairy) Other    Peanut Other    Magnet Other       REVIEW OF SYSTEMS:  GENERAL: Negative for malaise, significant weight loss and fever  HEAD: Negative for headache, swelling.  NECK: Negative for lumps, goiter, pain and significant neck swelling  RESPIRATORY: Negative for cough, wheezing or shortness of breath.  CARDIOVASCULAR: Negative for chest pain, leg swelling or palpitations.  GI: Negative for abdominal discomfort, blood in stools or black stools or change in bowel habits  : No history of dysuria, frequency or incontinence  MUSCULOSKELETAL: Negative for joint pain or swelling, back pain or muscle pain.  SKIN: Negative for lesions, rash, and itching.  PSYCH: Negative for sleep disturbance, mood disorder and recent psychosocial stressors.  ENDOCRINE: Negative for cold or heat intolerance, polyuria, polydipsia and goiter.    Objective   PHYSICAL EXAM:  Visit Vitals  Smoking Status Never    Body mass index is 47.12 kg/m².  General appearance: obese, NAD  Neuro: AOx3  Head: EOMI; no swelling or lesions of scalp or face  ENT:  no lumps or lymphadenopathy, thyroid normal to palpation; oropharynx clear, no swelling or erythema  Skin: warm, no erythema or rashes  Lungs: clear to percussion and auscultation  Heart: well perfused  Abdomen: soft, non-tender, no masses, no organomegaly  Extremities: Normal exam of the extremities. No swelling or pain.  Psych: no hurried speech, no flight of ideas, normal affect    IMPRESSION:  Melvin Foley is a 28 y.o. male with a bmi of Body mass index is 47.12 kg/m². with the following diagnoses and co-morbidities: PEH with sachin lesions on recent EGD (10/2023) and weight gain.  This is an extremely complex patient with multiple problems.  #1 he has a sizable paraesophageal hernia with Sachin's ulcers.  #2  he has multiple food allergies that limit his food choices.  However he is able to eat shrimp chicken beef and all vegetables.  #3 he has severe anemia.  After talking to the patient extensively it sounds like some of this anemia is the Sachin's ulcers and some of this anemia is malnutrition.  We discussed his situation in detail.  He inquired about endoscopic procedures but with the paraesophageal hernia endoscopic sleeve and the balloon are not an option.  We discussed that the primary approach to this would be hiatal hernia repair coupled with the weight loss operation.  Hiatal hernia repair alone is likely to recur and his BMI of 47.  The patient also expressed being frustrated that his weight continues to go up and I believe a lot of this has to do with poor food choices and lack of activity.  I discussed bypass versus sleeve along with the paraesophageal hernia repair.  We discussed that his food allergies allow him to eat the foods that we would primarily want him to focus on after weight loss surgery.  Due to his anemia and food allergies I might lean toward the sleeve along with his paraesophageal hernia.  But we did discuss that this may lead to some increased reflux after surgery.  After a lengthy discussion with the patient regarding his condition he seems frustrated as he did not know whether bariatric surgery is covered under his insurance.  We gave him a handout with specific questions to ask his insurance company.  He also was under the impression that he would have to take 6 months off of work to recover from this procedure.  We discussed the procedure in detail and the risks and benefits at length.  He now understands that the surgery would take about 2 to 3 hours to be performed and that he would be in the hospital for 2 nights.  He is likely to go back to work in about 3 weeks.  In the meantime we discussed lifestyle changes and avoiding carbohydrates in his diet.  We discussed getting 60 g of  protein and eating a lot of protein and vegetables on a daily regimen.  He said he is going to think about it and look into his options.  Unfortunately he seems very stuck on some of the misinformation that he claims he was given prior to our visit.  I tried to help clarify some of these questions as much as possible during her visit.  He is going to start by checking his insurance benefits and get back with us.  I advised him that changing his eating habits will help him with weight loss regardless of surgery.  I also recommended that he take his PPI daily due to the Sachin's ulcers to help with his anemia.  We will see whether the patient is able to comply.  We discussed the procedure at length as well as expcted recover.      We discussed the hiatal hernia repair and bypass or sleeve  at [facility] and all questions were answered. risks and benefits were discussed  The patient understands the risks and benefits of the procedure and how the procedure is performed. The patient understands the risks include but are not limited to bleeding, infection, DVT, PE, pneumonia, myocardial infarction, leak along the staple lines, and weight regain. We discussed lifestyle changes necessary to be successful.     This patient does meet the criteria for a surgical weight loss procedure according to NIH guidelines.  The risks of sleeve gastrectomy, Kandi-en-Y gastric bypass, and duodenal switch surgery including bleeding, leak, wound infection, dehydration, ulcers, internal hernia, DVT/PE, prolonged nausea/vomiting, incomplete resolution of associated medical conditions, reflux, weight regain, vitamin/mineral deficiencies, and death have been explained to the patient and Melvin Foley has expressed understanding and acceptance of them.     The increased risk of substance and alcohol abuse following bariatric surgery was discussed with the patient, along with the negative consequences of substance/alcohol use after surgery including  addiction, worsening of mental health disorders, and injury to the stomach. The risk of smoking and vaping (tobacco or any other substance) after bariatric surgery was explained to the patient. This includes risk of anastamotic ulcers, gastritis, bleeding, perforation, stricture, and PO intolerance.  The patient expressed understanding and acceptance of these risks.    The benefits of the above surgeries including weight loss, improvement/resolution of associated medical and mental health conditions, improved mobility, and decreased mortality have been explained the the patient and Melvin Foley has expressed understanding and acceptance of them.    Assessment/Plan   PLAN:    The plan of treatment for Melvin Foley is to continue with the consultations and tests ordered today in hopes of qualifying for pre-operative clearance for bariatric surgery. This includes:  Taking ppi daily to help heal the indira's ulcers  Keep a food diary and aim for 60 g of protein daily  Exercise daily.   Consult Nutrition for education and MSWL  Consult Psychology  Consult Physical Therapy for limited mobility  Consult cardiology  Consult pulmonology  Labs/CXR/EKG ordered  EGD  PCP for medical optimization  Consult sleep medicine - concern for KRISH    The following are some lifestyle changes you should begin to prepare you for your hiatal hernia repair and weight loss surgery.   Eliminate soda and other carbonated beverages from your diet. Carbonation will not be well tolerated after surgery. Try Propel, Vitamin Water Zero, Sobe Lifewater, Crystal Light or water.    Increase fluid consumption to 64 oz daily. Do not drink within 30 minutes of eating as this will liquefy your food and make you hungry more quickly.    Exercise for 30-60 minutes daily. Brisk walking, bike riding and swimming are all examples of healthy exercise. If you are unable to exercise we recommend seated exercise.    Do not skip meals.    Take a multivitamin daily.     Lose weight. In preparation for your surgery it is important that you begin making healthier food choices now. Our dietitian will meet with you to help you select foods lower in calories and   higher in nutrition. We would like you to lose at least 10  lbs prior to surgery.     Increase your protein intake to 60 grams per day.    Alcohol is empty calories. Please eliminate while preparing for surgery.    STOP Vaping/Smoking Nicotine or Marijuana IMMEDIATELY.  Plan your meals.      General Instruction:   1) Use the information we gave you today to work through your insurance requirements and medical clearances.   2) These documents need to get faxed to the program navigators so they can submit them for approval from your insurance company.   3) Obtain labs today at a  facility. We will call you with any abnormalities and corrections you need to make.   4) Continue to work with your primary care doctor and other specialist so your other health problems are well controlled prior to your surgery.   5) Adopt the recommendations of the program dietician so you develop healthy eating patterns.   6) Work with the sleep team to get your sleep apnea treated to prevent other health problems .   7) Consider attending a support group to learn from other who have been through the process.   8) Come to the MSWL sessions.         Dr. Dasha Hyatt M.D., MPH  Director of Bariatric & Minimally Invasive Surgery  [place] [address]  T:  [phone]  F:  [phone]     Bianka Roche, RN Assistant Nurse Manager, Care Coordinator - Bariatric Surgery [facility] Patient Nursing Contact  T:  [phone]  F:  [phone]    Ivette Monroe Patient Navigator - Bariatric Surgery [facility] Patient Clearance Questions & Tracking Contact  T:  [phone]  F:  [phone]

## 2023-12-04 ENCOUNTER — INFUSION (OUTPATIENT)
Dept: HEMATOLOGY/ONCOLOGY | Facility: HOSPITAL | Age: 28
End: 2023-12-04
Payer: COMMERCIAL

## 2023-12-04 ENCOUNTER — OFFICE VISIT (OUTPATIENT)
Dept: SURGERY | Facility: CLINIC | Age: 28
End: 2023-12-04
Payer: COMMERCIAL

## 2023-12-04 VITALS
OXYGEN SATURATION: 96 % | RESPIRATION RATE: 18 BRPM | HEART RATE: 87 BPM | SYSTOLIC BLOOD PRESSURE: 139 MMHG | DIASTOLIC BLOOD PRESSURE: 80 MMHG | TEMPERATURE: 96.8 F

## 2023-12-04 VITALS — HEIGHT: 74 IN | WEIGHT: 315 LBS | BODY MASS INDEX: 40.43 KG/M2

## 2023-12-04 DIAGNOSIS — E53.8 B12 DEFICIENCY: ICD-10-CM

## 2023-12-04 DIAGNOSIS — R53.82 CHRONIC FATIGUE: ICD-10-CM

## 2023-12-04 DIAGNOSIS — D51.0 PERNICIOUS ANEMIA: ICD-10-CM

## 2023-12-04 DIAGNOSIS — K76.0 HEPATIC STEATOSIS: ICD-10-CM

## 2023-12-04 DIAGNOSIS — Z01.818 PREOPERATIVE CLEARANCE: ICD-10-CM

## 2023-12-04 DIAGNOSIS — E66.01 MORBID OBESITY WITH BMI OF 45.0-49.9, ADULT (MULTI): ICD-10-CM

## 2023-12-04 DIAGNOSIS — Z98.84 BARIATRIC SURGERY STATUS: ICD-10-CM

## 2023-12-04 DIAGNOSIS — Z13.21 ENCOUNTER FOR VITAMIN DEFICIENCY SCREENING: ICD-10-CM

## 2023-12-04 DIAGNOSIS — K21.9 GASTROESOPHAGEAL REFLUX DISEASE, UNSPECIFIED WHETHER ESOPHAGITIS PRESENT: ICD-10-CM

## 2023-12-04 DIAGNOSIS — K21.9 HIATAL HERNIA WITH GERD: ICD-10-CM

## 2023-12-04 DIAGNOSIS — R16.1 SPLENOMEGALY: ICD-10-CM

## 2023-12-04 DIAGNOSIS — K44.9 HIATAL HERNIA WITH GERD: ICD-10-CM

## 2023-12-04 DIAGNOSIS — E66.01 MORBID OBESITY (MULTI): ICD-10-CM

## 2023-12-04 PROCEDURE — 96372 THER/PROPH/DIAG INJ SC/IM: CPT

## 2023-12-04 PROCEDURE — 3008F BODY MASS INDEX DOCD: CPT | Performed by: SURGERY

## 2023-12-04 PROCEDURE — 99205 OFFICE O/P NEW HI 60 MIN: CPT | Performed by: SURGERY

## 2023-12-04 PROCEDURE — 2500000004 HC RX 250 GENERAL PHARMACY W/ HCPCS (ALT 636 FOR OP/ED): Mod: SE | Performed by: INTERNAL MEDICINE

## 2023-12-04 PROCEDURE — 1036F TOBACCO NON-USER: CPT | Performed by: SURGERY

## 2023-12-04 RX ORDER — ALBUTEROL SULFATE 0.83 MG/ML
3 SOLUTION RESPIRATORY (INHALATION) AS NEEDED
Status: DISCONTINUED | OUTPATIENT
Start: 2023-12-04 | End: 2023-12-04 | Stop reason: HOSPADM

## 2023-12-04 RX ORDER — DIPHENHYDRAMINE HYDROCHLORIDE 50 MG/ML
50 INJECTION INTRAMUSCULAR; INTRAVENOUS AS NEEDED
Status: DISCONTINUED | OUTPATIENT
Start: 2023-12-04 | End: 2023-12-04 | Stop reason: HOSPADM

## 2023-12-04 RX ORDER — CYANOCOBALAMIN 1000 UG/ML
1000 INJECTION, SOLUTION INTRAMUSCULAR; SUBCUTANEOUS ONCE
Status: COMPLETED | OUTPATIENT
Start: 2023-12-04 | End: 2023-12-04

## 2023-12-04 RX ORDER — EPINEPHRINE 0.3 MG/.3ML
0.3 INJECTION SUBCUTANEOUS EVERY 5 MIN PRN
Status: DISCONTINUED | OUTPATIENT
Start: 2023-12-04 | End: 2023-12-04 | Stop reason: HOSPADM

## 2023-12-04 RX ORDER — EPINEPHRINE 0.3 MG/.3ML
0.3 INJECTION SUBCUTANEOUS EVERY 5 MIN PRN
Status: CANCELLED | OUTPATIENT
Start: 2024-01-01

## 2023-12-04 RX ORDER — DIPHENHYDRAMINE HYDROCHLORIDE 50 MG/ML
50 INJECTION INTRAMUSCULAR; INTRAVENOUS AS NEEDED
Status: CANCELLED | OUTPATIENT
Start: 2024-01-01

## 2023-12-04 RX ORDER — CYANOCOBALAMIN 1000 UG/ML
1000 INJECTION, SOLUTION INTRAMUSCULAR; SUBCUTANEOUS ONCE
Status: CANCELLED | OUTPATIENT
Start: 2024-01-01

## 2023-12-04 RX ORDER — FAMOTIDINE 10 MG/ML
20 INJECTION INTRAVENOUS ONCE AS NEEDED
Status: DISCONTINUED | OUTPATIENT
Start: 2023-12-04 | End: 2023-12-04 | Stop reason: HOSPADM

## 2023-12-04 RX ORDER — ALBUTEROL SULFATE 0.83 MG/ML
3 SOLUTION RESPIRATORY (INHALATION) AS NEEDED
Status: CANCELLED | OUTPATIENT
Start: 2024-01-01

## 2023-12-04 RX ORDER — FAMOTIDINE 10 MG/ML
20 INJECTION INTRAVENOUS ONCE AS NEEDED
Status: CANCELLED | OUTPATIENT
Start: 2024-01-01

## 2023-12-04 RX ADMIN — CYANOCOBALAMIN 1000 MCG: 1000 INJECTION, SOLUTION INTRAMUSCULAR at 12:09

## 2023-12-04 ASSESSMENT — ENCOUNTER SYMPTOMS
LOSS OF SENSATION IN FEET: 0
OCCASIONAL FEELINGS OF UNSTEADINESS: 0
DEPRESSION: 0

## 2023-12-04 ASSESSMENT — PATIENT HEALTH QUESTIONNAIRE - PHQ9
2. FEELING DOWN, DEPRESSED OR HOPELESS: NOT AT ALL
1. LITTLE INTEREST OR PLEASURE IN DOING THINGS: NOT AT ALL
SUM OF ALL RESPONSES TO PHQ9 QUESTIONS 1 & 2: 0

## 2023-12-04 ASSESSMENT — PAIN SCALES - GENERAL: PAINLEVEL: 0-NO PAIN

## 2023-12-04 ASSESSMENT — COLUMBIA-SUICIDE SEVERITY RATING SCALE - C-SSRS
6. HAVE YOU EVER DONE ANYTHING, STARTED TO DO ANYTHING, OR PREPARED TO DO ANYTHING TO END YOUR LIFE?: NO
1. IN THE PAST MONTH, HAVE YOU WISHED YOU WERE DEAD OR WISHED YOU COULD GO TO SLEEP AND NOT WAKE UP?: NO
2. HAVE YOU ACTUALLY HAD ANY THOUGHTS OF KILLING YOURSELF?: NO

## 2023-12-06 ENCOUNTER — DOCUMENTATION (OUTPATIENT)
Dept: SURGERY | Facility: CLINIC | Age: 28
End: 2023-12-06
Payer: COMMERCIAL

## 2024-01-02 ENCOUNTER — APPOINTMENT (OUTPATIENT)
Dept: HEMATOLOGY/ONCOLOGY | Facility: HOSPITAL | Age: 29
End: 2024-01-02
Payer: COMMERCIAL

## 2024-01-04 ENCOUNTER — TELEPHONE (OUTPATIENT)
Dept: HEMATOLOGY/ONCOLOGY | Facility: HOSPITAL | Age: 29
End: 2024-01-04
Payer: COMMERCIAL

## 2024-01-04 NOTE — TELEPHONE ENCOUNTER
Patient is a current patient of Dr. Govea and needs to establish care with another provider prior to next scheduled B12 injection on Thursday, 2/5/24. Attempted to reach patient to schedule established Hem Onc visit with Bev SALEEM. Left detailed message, with call back number, for patient to call back at earliest convenience.

## 2024-01-05 NOTE — PROGRESS NOTES
"Initial Bariatric Nutrition Assessment    Surgeon:   Olena  Patient is considering: RNYGB w/HHR    ASSESSMENT:  Current weight:   Vitals:    01/08/24 1031   Weight: (!) 164 kg (362 lb)     Ht:  1.88 m (6' 2\")   BMI:  Body mass index is 46.48 kg/m².     Pre-Op Excess Body Weight (EBW):   168lbs    Target Post-Op weight goal:     227.6-261.2lbs    This is a 28 y.o. male with morbid obesity (Body mass index is 46.48 kg/m².) who presents to clinic for consideration of bariatric surgery. he has attempted and failed multiple diet and exercise regimens for weight loss.   Initial Onset of obesity was  4 Year(s) Ago when he started gaining weight and developing other health issues.  Their goal for surgery is to   have the HHR . Does not prefer to have weight loss surgery.  The patient has tried multiple diets to lose weight including vegan/Exercise. The patient has not lost weight from exercise. The patient considers their dietary weakness to be  too small or a portion size.  The patient reports a  highest weight ever of 362 pounds and lowest weight ever of 250 pounds   Current diet:   The patient does not exercise. Noted the PCP \"did not clear him for exercise d/t hernia location\", but noted that he is ok to do light exercise. Physical at work.     Food allergies/intolerances:  gluten/dairy intolerance, allergies: corn, soy, eggs, sesame seeds, walnuts, peanuts, fish (shrimp is ok)   Chewing/Swallowing/Dentition: no  Nausea / Vomiting / Hx Gastroparesis:  no  Diarrhea/ Constipation: no  Smoking/Tobacco use: no  Vitamins/Minerals supplements: none  Hours of sleep/night: 6 hours   Work: [employer]     Medications:     Current Outpatient Medications:     cyanocobalamin (Vitamin B-12) 1,000 mcg/mL injection, every 30 (thirty) days., Disp: , Rfl:     EPINEPHrine (EpiPen 2-Adam) 0.3 mg/0.3 mL injection syringe, USE AS DIRECTED IN CASE OF A SEVERE ALLERGIC REACTION, Disp: , Rfl:     folic acid (Folvite) 1 mg tablet, Take 1 tablet (1 " mg) by mouth once daily., Disp: , Rfl:     omeprazole (PriLOSEC) 40 mg DR capsule, Take 1 capsule (40 mg) by mouth once daily., Disp: , Rfl:       24 HOUR RECALL/DIET HISTORY:  Breakfast: potatoes    Snack:    Lunch: wrap w/meat  Snack:   Dinner: burger    Snack:   Beverages: carbonated water, water,  juice, energy drinks     Alcohol: rarely- allergic mostly     Person responsible for cooking & shopping?   self  How often do you eat sweet snacks?   Not often  How often do you eat savory snacks?  Not often  How often do you eat out?  2/3x per month  Do you feel overly stuffed?  no  Binge Eating?  no  Night Eating?  no  Emotional Eating?  no       READINESS TO LEARN:  Motivation to learn: Interested        Understanding of instruction: Good      Anticipated Compliance: Good        Family Support: parents           Educational Materials Provided:    Schedules for MSWL class and support group   1500  Calorie meal plan   Goals sheet    Nutrition assessment completed today.  Pt will be scheduled for video education class to discuss the 2 week pre op diet, post op protein and fluid goals, vitamin and mineral supplementation, exercise goals, and post op diet progression closer to the time of surgery    Patient is seeking RNYGB w/HHR    Instructed pt on a 1500 calorie meal plan and to measure and record intake daily. Advised to eat 3 meals per and 1-2 high protein snacks.  Recommend eating 4-5 oz per meal. Reviewed the postop behaviors to start practicing.  Set a goal to start doing exercise of choices for  2x per week for 60 minutes.     Patient was receptive to nutritional recommendations, asked numerous questions, and verbalized understanding of the weight loss surgery diet.  Patient expressed understanding about the importance of strict dietary compliance post-surgery to avoid nutritional deficiencies and achieve optimal weight loss and verbalized intent to follow dietary recommendations.    Malnutrition Screening:    Significant unintentional weight loss? n/a   Eating less than 75% of usual intake for more than 2 weeks? n/a      Nutrition Diagnosis:   Overweight/obesity related to excess energy intake as evidenced by BMI >= 40 kg/m^2.  Food- and nutrition-related knowledge deficit related to lack of prior exposure to surgical weight loss information as evidenced by pt new to surgical program.    Nutrition Interventions:   Modify type and amount of food and nutrients within meals and snacks.  Comprehensive Nutrition Education    Recommendations:  1. Begin following your meal plan.  Measure and record intake daily.   2. Structure meal patterns, eating three meals and 1-2 snacks per day.  3. Aim for 4-5 oz protein per meal.  Have 1-2 high protein snacks that are 10-20 g protein each.  You can try a tuna or chicken packet, Greek yogurt, 2 string cheeses, Protein bars like Quest, Pure Protein, Premier, or Built Bars. you can also try protein chips form Quest or Atkins.    4. Drink 64oz of calorie-free, caffeine-free, and non-carbonated beverages. Practice taking sips.  5. Practice no drinking 30 minutes before meals, nothing with meals and wait 30 minutes after meals to drink again. Make meals last 30 minutes-chew thoroughly.   6. Limit or omit eating out/sweets/savory snacks to 1-2 times per week.  7. Begin daily multivitamin.  Fl[employer] Complete has everything you need.  8. Begin walking for 60 minutes 2 days a week. Increase physical activity by 10-15 minutes as tolerated to an end goal of 60 minutes 5 x per week. Consistency is the key.    Pre-op Goal weight: lose 5% of body weight    Nutrition Monitoring and Evaluation: 1-2 pound weight loss per week  Criteria: weight check  Need for Follow-up: one month     Patient does meet [employer] guidelines for weight loss surgery, however needs to demonstrate consistent effort in making dietary changes before giving clearance. It is anticipated that the patient will  need at least 1 nutritional follow-up visits prior to clearance for surgery.      Ivon Ramos MS, RD, LD  Phone: [phone]

## 2024-01-08 ENCOUNTER — INFUSION (OUTPATIENT)
Dept: HEMATOLOGY/ONCOLOGY | Facility: HOSPITAL | Age: 29
End: 2024-01-08
Payer: COMMERCIAL

## 2024-01-08 ENCOUNTER — NUTRITION (OUTPATIENT)
Dept: SURGERY | Facility: CLINIC | Age: 29
End: 2024-01-08
Payer: COMMERCIAL

## 2024-01-08 VITALS
OXYGEN SATURATION: 97 % | HEART RATE: 92 BPM | RESPIRATION RATE: 16 BRPM | DIASTOLIC BLOOD PRESSURE: 84 MMHG | TEMPERATURE: 97.3 F | SYSTOLIC BLOOD PRESSURE: 145 MMHG

## 2024-01-08 VITALS — WEIGHT: 315 LBS | HEIGHT: 74 IN | BODY MASS INDEX: 40.43 KG/M2

## 2024-01-08 DIAGNOSIS — E53.8 B12 DEFICIENCY: ICD-10-CM

## 2024-01-08 PROCEDURE — 96372 THER/PROPH/DIAG INJ SC/IM: CPT | Performed by: INTERNAL MEDICINE

## 2024-01-08 PROCEDURE — 96372 THER/PROPH/DIAG INJ SC/IM: CPT

## 2024-01-08 PROCEDURE — 2500000004 HC RX 250 GENERAL PHARMACY W/ HCPCS (ALT 636 FOR OP/ED): Mod: SE | Performed by: INTERNAL MEDICINE

## 2024-01-08 RX ORDER — CYANOCOBALAMIN 1000 UG/ML
1000 INJECTION, SOLUTION INTRAMUSCULAR; SUBCUTANEOUS ONCE
Status: CANCELLED | OUTPATIENT
Start: 2024-01-29

## 2024-01-08 RX ORDER — CYANOCOBALAMIN 1000 UG/ML
1000 INJECTION, SOLUTION INTRAMUSCULAR; SUBCUTANEOUS ONCE
Status: COMPLETED | OUTPATIENT
Start: 2024-01-08 | End: 2024-01-08

## 2024-01-08 RX ORDER — ALBUTEROL SULFATE 0.83 MG/ML
3 SOLUTION RESPIRATORY (INHALATION) AS NEEDED
Status: CANCELLED | OUTPATIENT
Start: 2024-01-29

## 2024-01-08 RX ORDER — DIPHENHYDRAMINE HYDROCHLORIDE 50 MG/ML
50 INJECTION INTRAMUSCULAR; INTRAVENOUS AS NEEDED
Status: CANCELLED | OUTPATIENT
Start: 2024-01-29

## 2024-01-08 RX ORDER — EPINEPHRINE 0.3 MG/.3ML
0.3 INJECTION SUBCUTANEOUS EVERY 5 MIN PRN
Status: CANCELLED | OUTPATIENT
Start: 2024-01-29

## 2024-01-08 RX ORDER — FAMOTIDINE 10 MG/ML
20 INJECTION INTRAVENOUS ONCE AS NEEDED
Status: CANCELLED | OUTPATIENT
Start: 2024-01-29

## 2024-01-08 RX ADMIN — CYANOCOBALAMIN 1000 MCG: 1000 INJECTION, SOLUTION INTRAMUSCULAR at 12:42

## 2024-01-08 ASSESSMENT — PAIN SCALES - GENERAL: PAINLEVEL: 0-NO PAIN

## 2024-01-08 ASSESSMENT — PATIENT HEALTH QUESTIONNAIRE - PHQ9
1. LITTLE INTEREST OR PLEASURE IN DOING THINGS: NOT AT ALL
SUM OF ALL RESPONSES TO PHQ9 QUESTIONS 1 & 2: 0
2. FEELING DOWN, DEPRESSED OR HOPELESS: NOT AT ALL

## 2024-01-08 NOTE — TELEPHONE ENCOUNTER
Patient is a current patient of Dr. Govea and needs to establish care with another provider. Patient was in office for B12 injection. Asked patient if he was ok to schedule established Hem Onc visit with ARTEMIO Cordero. Patient is agreeable. Scheduled for Monday, 2/5/24 at 1 PM. Patient verbalized understanding and agreed to appointment.

## 2024-01-29 ENCOUNTER — OFFICE VISIT (OUTPATIENT)
Dept: SLEEP MEDICINE | Facility: CLINIC | Age: 29
End: 2024-01-29
Payer: COMMERCIAL

## 2024-01-29 VITALS
HEIGHT: 74 IN | DIASTOLIC BLOOD PRESSURE: 82 MMHG | WEIGHT: 315 LBS | HEART RATE: 102 BPM | SYSTOLIC BLOOD PRESSURE: 156 MMHG | OXYGEN SATURATION: 99 % | BODY MASS INDEX: 40.43 KG/M2

## 2024-01-29 DIAGNOSIS — Z98.84 BARIATRIC SURGERY STATUS: ICD-10-CM

## 2024-01-29 DIAGNOSIS — R03.0 ELEVATED BLOOD PRESSURE READING WITHOUT DIAGNOSIS OF HYPERTENSION: Primary | ICD-10-CM

## 2024-01-29 DIAGNOSIS — E66.01 MORBID OBESITY (MULTI): ICD-10-CM

## 2024-01-29 DIAGNOSIS — Z01.818 PREOPERATIVE CLEARANCE: ICD-10-CM

## 2024-01-29 DIAGNOSIS — K13.79 LESION OF HARD PALATE: ICD-10-CM

## 2024-01-29 PROCEDURE — 1036F TOBACCO NON-USER: CPT | Performed by: PSYCHIATRY & NEUROLOGY

## 2024-01-29 PROCEDURE — 99204 OFFICE O/P NEW MOD 45 MIN: CPT | Performed by: PSYCHIATRY & NEUROLOGY

## 2024-01-29 PROCEDURE — 3008F BODY MASS INDEX DOCD: CPT | Performed by: PSYCHIATRY & NEUROLOGY

## 2024-01-29 ASSESSMENT — ENCOUNTER SYMPTOMS
LOSS OF SENSATION IN FEET: 0
OCCASIONAL FEELINGS OF UNSTEADINESS: 0
DEPRESSION: 0

## 2024-01-29 NOTE — PROGRESS NOTES
Patient: Melvin Foley    [id number]  : 1995 -- AGE 28 y.o.    Provider: Brian Navas MD     [facility]   Service Date: 2024              [facility]  New Visit Note          The patient's referring provider is: Ant Renee Mai, MD    HPI:  Melvin Foley is a 28 y.o. male with PMH notable for dyslipidemia, morbid obesity undergoing the bariatric surgery process, GERD, hiatal hernia, vitamin D deficiency, pernicious anemia, migraines, history of iron deficiency anemia, and chronic fatigue, who presents today for surgical clearance as part of the bariatric surgery process.    He has no sleep complaints.    NIGHTTIME SYMPTOMS:   Snoring: none that he knows of, but sleeps alone, though never told he snores by family/friends  Witnessed apnea: No  Nocturnal gasping: No  Nocturnal choking: No  Sleep walking: No  Sleep talking:  No  Dream enactment: No  Bruxism: thinks so - can wake up with a sore jaw and knows he clenches his teeth during the daytime  Nocturnal excessive sweating: only when bedroom is hot  Nocturnal GERD: No  Morning headaches: No  Morning dry mouth/sore throat: No  Nocturia: up to once/night  Restless sleep: No  Sleep paralysis: No  Hypnagogic/hypnopompic hallucinations: No    DAYTIME SYMPTOMS  Roann: 2/24  Daytime sleepiness: No  Fatigue: No  Trouble with memory/concentration: No  Dozing: No  Feeling sleepy while driving: Denies      RLS symptoms: No   Cataplexy: No    SLEEP HABITS:   Self-described night person  Preferred sleep position: lateral  Bedtime: 11 pm weekdays, 12-1 am weekends, sleep latency usually 5 minutes, infrequently up to about 1 hour  Wake time: 7 am weekdays, 7-10 am weekends  # of nocturnal awakenings: 0-1 due to nocturia  Napping: infrequent on weekends for up to an hour. Napping is generally refreshing  Total estimated sleep per 24 hrs: approximately 6 hours    PRIOR TREATMENTS:  No stimulants or sleep  aids.    Patient Active Problem List   Diagnosis    Anaphylaxis    Anemia    Chondromalacia    Chronic fatigue    Dyslipidemia    H/O food allergy    Hepatic steatosis    GERD (gastroesophageal reflux disease)    Hiatal hernia with GERD    Obesity    Pernicious anemia    Vitamin deficiency    Class 3 severe obesity due to excess calories with body mass index (BMI) of 40.0 to 44.9 in adult (CMS/[facility])    Splenomegaly    H/O iron deficiency anemia    Iron malabsorption    B12 deficiency    Bariatric surgery status    Preoperative clearance    Encounter for vitamin deficiency screening    Morbid obesity with BMI of 45.0-49.9, adult (CMS/[facility])     Past Medical History:   Diagnosis Date    B12 deficiency 09/29/2023    GERD (gastroesophageal reflux disease)     Hyperlipidemia, unspecified 06/10/2021    Dyslipidemia    Iron malabsorption 09/19/2023    Personal history of diseases of the blood and blood-forming organs and certain disorders involving the immune mechanism 06/23/2021    History of anemia     Past Surgical History:   Procedure Laterality Date    COLONOSCOPY      DENTAL SURGERY      OTHER SURGICAL HISTORY  06/10/2021    Knee surgery    UPPER GASTROINTESTINAL ENDOSCOPY       Current Outpatient Medications   Medication Sig Dispense Refill    cyanocobalamin (Vitamin B-12) 1,000 mcg/mL injection every 30 (thirty) days.      EPINEPHrine (EpiPen 2-Adam) 0.3 mg/0.3 mL injection syringe USE AS DIRECTED IN CASE OF A SEVERE ALLERGIC REACTION      folic acid (Folvite) 1 mg tablet Take 1 tablet (1 mg) by mouth once daily.      omeprazole (PriLOSEC) 40 mg DR capsule Take 1 capsule (40 mg) by mouth once daily.       No current facility-administered medications for this visit.     Allergies   Allergen Reactions    Gluten Anaphylaxis    Cod Liver Oil Other    Corn Other    Egg Other    Milk Containing Products (Dairy) Other    Peanut Other    Captain Cook Other    Food Extracts GI Upset     Was tested & has several food allergies  "      FAMILY HISTORY OF SLEEP DISORDERS: mother may have sleep apnea  Family History   Problem Relation Name Age of Onset    Arthritis Mother      Depression Mother      Hypertension Father         SOCIAL HISTORY  Employment: works at an [employer]  Lives with: parents  Alcohol: 0-2 drinks/week  Cigarettes: never  Illicits: none  Caffeine: rare     ROS: 12 point ROS negative for all items.    PHYSICAL EXAMINATION:   Vitals:    01/29/24 1507   BP: 156/82   BP Location: Left arm   BP Cuff Size: Large adult long   Pulse: 102   SpO2: 99%   Weight: (!) 164 kg (362 lb)   Height: 1.88 m (6' 2\")     Body mass index is 46.48 kg/m².  General: Awake. Alert. Comfortable. No apparent distress.   Speech: Normal  Comprehension: Normal  Mood: Stable  Affect: Appropriate  Eyes:   Eyelids: normal            ENT:          Nares are patent bilaterally. Septum deviation absent. Marquez tongue position III. Tongue scalloping is not present, tongue is enlarged, soft palate is mildly elongated, hard palate is not high arched. Uvula is not enlarged. Retrognathia is not present. Tonsils are not enlarged. Dentition is good. Several red spots on the posterior hard palate approximately 1 cm in diameter           Neck:          Circumference: large in caliber  Cardiac: Regular in rate and rhythm. No murmurs.  No edema in bilateral lower extremities.  Pul:         Clear to auscultation bilaterally. Normal respiratory effort   Abd:         obese  Neuro: Alert, well-oriented. Cranial nerves II-XII grossly normal and symmetric.  Moves all limbs symmetrically with no evidence of significant focal weakness. No abnormal movements noted. Normal gait      LABS/DIAGNOSTICS:  Lab Results   Component Value Date    HGB 9.7 (L) 11/06/2023    CO2 24 06/14/2021    TSH 1.67 06/14/2021    FERRITIN 21 11/06/2023    IRON 21 (L) 11/06/2023    TIBC  11/06/2023      Comment:      One or more of the analytes used in this calculation is outside of the analytical " measurement range.      IRONSAT  11/06/2023      Comment:      One or more analytes used in this calculation is outside of the analytical measurement range. Calculation cannot be performed.    VITD25 17 (A) 06/14/2021    [id number] 09/18/2023        Echo: none on file  PFTs: none on file      ASSESSMENT AND PLAN: Mr. Melvin Foley is a 28 y.o. male here for clearance for bariatric surgery.      #morbid obesity - moderate risk of KRISH (STOP-BANG score of 3-4 (BMI, neck size, male sex, and elevated BP), important to assess due to his obesity and perioperative risk management  #presurgical clearance  #bariatric surgery status  -We discussed the risk factors for sleep apnea, pathophysiology of sleep apnea, treatment options, and potential long-term complications of untreated KRISH, including cardiovascular and metabolic complications. We will start evaluation with an in-lab split night home sleep test.     #elevated BP reading without diagnosis of HTN  -management per PCP    #lesion of hard palate - several areas of erythema - appear to possibly be ruptured blood vessels. Pt denies sore throat or trauma to throat  -advised pt to keep an eye on it and talk to his PCP if it does not improve or if it worsens in the next few days    All of the above was discussed with the patient in detail. He voiced an understanding of the above and was agreeable to proceed further as advised. Procedure for the sleep study was discussed with him.    Around 45 minutes were spent on this encounter, including time reviewing the chart, conducting the H&P, counseling the patient, and documenting/placing orders.    FOLLOW UP:  After study to discuss results

## 2024-01-30 ENCOUNTER — APPOINTMENT (OUTPATIENT)
Dept: HEMATOLOGY/ONCOLOGY | Facility: HOSPITAL | Age: 29
End: 2024-01-30
Payer: COMMERCIAL

## 2024-01-31 PROBLEM — R73.9 HYPERGLYCEMIA: Status: ACTIVE | Noted: 2024-01-31

## 2024-01-31 PROBLEM — R63.5 WEIGHT GAIN: Status: ACTIVE | Noted: 2024-01-31

## 2024-01-31 PROBLEM — D50.9 IRON DEFICIENCY ANEMIA: Status: ACTIVE | Noted: 2024-01-31

## 2024-01-31 PROBLEM — D75.1 SECONDARY POLYCYTHEMIA: Status: ACTIVE | Noted: 2022-12-26

## 2024-01-31 PROBLEM — R11.2 NAUSEA, VOMITING, AND DIARRHEA: Status: ACTIVE | Noted: 2024-01-31

## 2024-01-31 PROBLEM — R55 PRE-SYNCOPE: Status: ACTIVE | Noted: 2024-01-31

## 2024-01-31 PROBLEM — R05.9 COUGH: Status: ACTIVE | Noted: 2024-01-31

## 2024-01-31 PROBLEM — J30.9 ALLERGIC RHINITIS: Status: ACTIVE | Noted: 2024-01-31

## 2024-01-31 PROBLEM — R16.2 HEPATOMEGALY WITH SPLENOMEGALY, NOT ELSEWHERE CLASSIFIED: Status: ACTIVE | Noted: 2023-07-17

## 2024-01-31 PROBLEM — E34.9 ENDOCRINE DISORDER, UNSPECIFIED: Status: ACTIVE | Noted: 2022-12-05

## 2024-01-31 PROBLEM — Z91.199 PATIENT'S NONCOMPLIANCE WITH OTHER MEDICAL TREATMENT AND REGIMEN DUE TO UNSPECIFIED REASON: Status: ACTIVE | Noted: 2022-12-05

## 2024-01-31 PROBLEM — R51.9 HEADACHE: Status: ACTIVE | Noted: 2024-01-31

## 2024-01-31 PROBLEM — R19.7 NAUSEA, VOMITING, AND DIARRHEA: Status: ACTIVE | Noted: 2024-01-31

## 2024-01-31 PROBLEM — R76.9 POSITIVE ALLERGY TEST: Status: ACTIVE | Noted: 2024-01-31

## 2024-01-31 PROBLEM — R74.8 ABNORMAL LIVER ENZYMES: Status: ACTIVE | Noted: 2024-01-31

## 2024-01-31 PROBLEM — D52.9 FOLATE DEFICIENCY ANEMIA, UNSPECIFIED: Status: ACTIVE | Noted: 2023-07-17

## 2024-01-31 PROBLEM — R10.9 ABDOMINAL PAIN: Status: ACTIVE | Noted: 2024-01-31

## 2024-01-31 PROBLEM — J20.9 ACUTE BRONCHITIS DUE TO INFECTION: Status: ACTIVE | Noted: 2024-01-31

## 2024-02-04 NOTE — PROGRESS NOTES
"[facility]    PATIENT VISIT INFORMATION    Visit Type: Follow up new provider    Referring Provider: [facility]  Reason for referral: Severe anemia     CANCER/HEMATOLGOY HISTORY    Patient is a 28 -year-old white man referred for severe anemia, splenomegaly, and abnormal CT with bowel thickening and hiatal hernia as well as  subcentimeter paraesophageal lymph nodes, details outlined below.  Patient was admitted 6/15/2021 for 1 day with generalized weakness and lightheadedness with a hemoglobin of 4s, noted to have severe iron deficiency anemia, was given 3 units packed red  blood cells, discharged on ferrous sulfate 3 times a day, recommended outpatient endoscopy.   Patient stated that he took iron 3 tablets, 3 times a day, 9 tablets/day for 1 month, ran out, then  took over-the-counter 1/day, no significant side effects even with this high dose of oral iron.  Patient was seen by PCP 6/23/2021 for follow-up anemia with B12 injection given, cough improved  after he was treated for bronchitis 6/2021 with methylprednisolone and azithromycin, chronic medical issues; patient said he only had the one B12 injection and then was requested to see me .  Patient was back in the ER 7/19/2021 with some intermittent upper abdominal discomfort, ran out of a \"little brown pill for my spleen\" for 1 week, intermittent dizziness and headaches over the past week; they refilled his pantoprazole and recommended  follow-up with hematology.    Given persistent iron deficiency despite PO iron, January 2022 Feraheme was given x2 doses, stopped oral iron in August 2022 given likely  ineffective, Feraheme again given 12/2022 x 2 doses, 3/2023 x2 doses (hemoglobin down to 9.9 without any visible bleeding), 4/2023 x2 doses, 7/2023 x 2 doses.  9/25/2023 patient refused Feraheme after 2 IV unsuccessful attempts, and I requested information to be given to the patient about a port-11/6/2023 patient " initially said he never got information about a port, just said that a port existed, but as I was walking out the door said that he did get information mailed to him, is still undecided if he wants a port.  11/6/2023 patient stated that there is only a 1 or 2 nurses that can get his IV, cannot give me the name of those nurses, says that he only takes iron if he feels bad even though I told him he likely does not absorb that.  9/1/2023 provider Priscila recommended an EGD and colonoscopy.  10/27/2023 Dr. Pfeiffer performed a colonoscopy showing retained stool with prior colonoscopy normal and same-day EGD with source of bleeding so likely not necessary to redo, EGD with 6 cm hiatal hernia with Sachin's lesions, normal-appearing duodenum with biopsy still pending; felt iron deficiency was secondary to hiatal hernia with Sachin's erosions, has an appointment with Dr. Hyatt general surgery 12/4/2023.     Hemoglobin was at the upper limit of normal various times in 2022, erythropoietin was slightly elevated so referred  to Dr. Valenzuela who saw him 9/19/2022 with no signs or symptoms of a pulmonary disorder or sleep apnea, follow-up as needed; erythropoietin was again elevated so CT chest/abdomen/pelvis was performed 12/26/2022 showing no evidence of malignancy but  hepatosplenomegaly with fatty infiltration, referred to Dr. Pfeiffer with hepatology who saw him 5/19/2023 with labs and FibroScan ordered along with weight loss program, likely NAFLD, follow-up in 6 months; he still has not done any of this testing yet except for the labs.  He  has received B12 injections monthly, most recently today 11/6/2023.  He does not necessarily feel any different on all of this vitamin supplementation,  even after IV iron.  10/14/21 Dr. Franco an EGD and colonoscopy with normal colonoscopy, EGD with LA grade B reflux esophagitis without bleeding, 3 cm hiatal hernia, multiple Sachin's ulcers, acute gastritis; pathology showed  unremarkable duodenal biopsy, stomach with reactive gastropathy but negative H. pylori; patient started on the PPI with no change in any of his symptoms.  7/18/2022 he met with gastroenterology provider Charito who recommended continuing PPI, no endoscopy because symptoms and labs  better, referred for a weight loss specialist; he saw provider Charito again 1/16/2023 noting no endoscopy due to no anemia.      Since 2015 patient has been on a gluten-free diet because of a history of multiple food allergies including gluten, never officially diagnosed with celiac disease.  His previous symptoms of headache, dizziness, presyncope have significantly improved  although he still has intermittent mild headaches but no more dizziness/presyncope.  8/15/2022 he was having intermittent mild abdominal pain-still happens rarely, stable.        HISTORY OF PRESENT ILLNESS     ID Statement: Melvin Foley is a 28 year old male     Chief Complaint: Anemia     Interval History:   Melvin presents today for follow up.  He reports fatigue at his baseline.  He does note weight gain despite trying to moderate what he eats.  He is not physically active incorporating exercise.  He follows up with general surgery and will have a range of testing and procedures completed.  He is on the track for bariatric surgery.  He reports that he would like to get his hiatal hernia repaired and notes that he must have bariatric surgery for this procedure to take place.  No fevers,  unintentional weight loss, sore throat, acute vision changes, chest pain, edema, shortness breath, cough, nausea or vomiting  now, abnormal bowel movements now,  blood in stool, dysuria or hematuria,  bone/back/muscle/joint pain, numbness, focal weakness, tingling, rashes or lumps, abnormal bruising or bleeding, diabetes  or thyroid disorder although elevated glucose on labs, anxiety or depression.   PAST/CURRENT HISTORY     MEDICAL/SURGICAL HISTORY  -Anemia   -bowel thickening  -  "iron and B1 2 deficiency   -Hepatosplenomegaly 12/26/2022.  Following GI, FibroScan ordered along with weight loss program, likely NAFLD.   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     OCCUPATIONAL/ENVIRONMENTAL HISTORY/EXPOSURES:  -none reported    Active Problems, Allergy List, Medication List, and PMH/PSH/FH/Social Hx have been reviewed and reconciled in chart. Updates made when necessary.     REVIEW OF SYSTEMS   A review of systems has been completed and are negative for complaints except what is stated in the assessment, HPI, IH, ROS, and/or past medical history.    ALLERGIES AND MEDICATIONS     Allergies and Intolerances:   Allergies   Allergen Reactions    Gluten Anaphylaxis    Cod Liver Oil Other    Corn Other    Egg Other    Milk Containing Products (Dairy) Other    Peanut Other    [place] Other    Egg Extract Other    Food Extracts GI Upset     Was tested & has several food allergies      Medication Profile:   Current Outpatient Medications   Medication Instructions    cyanocobalamin (Vitamin B-12) 1,000 mcg/mL injection Every 30 days    EPINEPHrine (EpiPen 2-Adam) 0.3 " mg/0.3 mL injection syringe USE AS DIRECTED IN CASE OF A SEVERE ALLERGIC REACTION    folic acid (Folvite) 1 mg tablet 1 tablet, oral, Daily    omeprazole (PriLOSEC) 40 mg DR capsule 1 capsule, oral, Daily      Available Vaccination Record:   Immunization History   Administered Date(s) Administered    Hep A, Unspecified 08/03/2010, 08/04/2012    Meningococcal MCV4P 07/29/2009    Moderna SARS-CoV-2 Vaccination 05/02/2021, 05/30/2021, 05/23/2022    Tdap vaccine, age 7 year and older (BOOSTRIX, ADACEL) 07/29/2009    Varicella vaccine, subcutaneous (VARIVAX) 08/03/2010      PHYSICAL EXAM     Vital Signs/Measurements:   Vitals:  Visit Vitals  /89 (BP Location: Left arm, Patient Position: Sitting)   Pulse 99   Temp 36.7 °C (98.1 °F) (Temporal)   Resp 16      Weight:  Vitals:    02/05/24 1224   Weight: (!) 167 kg (368 lb 9.8 oz)      Pertinent previous Wt./Vitals:    Performance:   ECOG Performance Status: 0     Grade ECOG performance status   0 Fully active, able to carry on all pre-disease performance without restriction   1 Restricted in physically strenuous activity but ambulatory and able to carry out work of a light or sedentary nature, e.g., light housework, office work   2 Ambulatory and capable of all selfcare but unable to carry out any work activities; Up and about more than 50% of waking hours   3 Capable of only limited selfcare, confined to bed or chair more than 50% of waking hours   4 Completely disabled; Cannot carry out any selfcare; Totally confined to bed or chair   5 Dead     Physical Exam:  General: Patient is awake/alert/oriented x3, no distress, Nourished, hydrated, alert and cooperative, ambulating without difficulty  Skin: Pallor noted, good turgor, dry, no prominent lesions, rashes, unusual bruising, or bleeding   Hair: Normal texture and distribution   Nails: Normal color, no deformities    HEENT:   Head: Normocephalic, atraumatic, no visible or palpable masses, depressions, or scarring    Eyes: Visual acuity intact, conjunctiva clear, sclera non-icteric, PERRL, EOMI, no exudates or hemorrhages   Ears: nl appearance, hearing intact    Nose: no external lesions, mucosa non-inflamed, no rhinorrhea   Mouth: Mucous membranes moist, no lesions, sores, bleeding, or erythema     Head/Neck: Neck supple, no apparent injury, thyroid without mass or tenderness, No JVD, trachea midline, no bruits appreciated    Respiratory/Thorax: Patent airways, CTAB, chest symmetry, normal inspiratory and expiratory effort    Cardiovascular: Regular rate and rhythm, no murmur or gallop, no carotid bruit or thrills    Gastrointestinal: Bowel sounds normal, non-distended, soft, no tenderness, no masses or hernia, or organomegaly appreciated, unable to fully assess due to obesity abdominal distention   Genitourinary: deferred   Musculoskeletal: Normal gait, normal range of motion, no pain on palpation of spine, no deformity, normal strength for baseline, no atrophy, and no CVA tenderness appreciated   Extremities: No amputations or deformities, cyanosis, edema or viscosities, peripheral pulses intact   Neurological: CNs normal. Sensation present to touch, intact senses, motor response and reflexes normal, normal strength   Breast: Deferred    Lymphatic: No significant lymphadenopathy   Psychological: A/O X3, intact recent and remote memory, judgement, and insight. Appropriate mood, and behavior, mild flat affect initially        RESULTS/DATA     Labs:   Lab Results   Component Value Date    WBC 6.2 02/05/2024    NEUTROABS 4.12 02/05/2024    IGABSOL 0.03 02/05/2024    LYMPHSABS 1.00 (L) 02/05/2024    MONOSABS 0.47 02/05/2024    EOSABS 0.49 02/05/2024    BASOSABS 0.04 02/05/2024    RBC 3.72 (L) 02/05/2024    MCV 70 (L) 02/05/2024    MCHC 27.9 (L) 02/05/2024    HGB 7.3 (L) 02/05/2024    HCT 26.2 (L) 02/05/2024     02/05/2024     Lab Results   Component Value Date    RETICCTPCT 2.8 (H) 02/05/2024      Lab Results   Component  Value Date    CREATININE 0.85 02/05/2024    BUN 7 02/05/2024    EGFR >90 02/05/2024     02/05/2024    K 3.8 02/05/2024     (H) 02/05/2024    CO2 24 02/05/2024      Lab Results   Component Value Date    ALT 37 02/05/2024    AST 28 02/05/2024    ALKPHOS 54 02/05/2024    BILITOT 0.6 02/05/2024      Lab Results   Component Value Date    TSH 1.68 02/05/2024     Lab Results   Component Value Date    TSH 1.68 02/05/2024     Lab Results   Component Value Date    IRON 16 (L) 02/05/2024    TIBC  02/05/2024      Comment:      One or more of the analytes used in this calculation is outside of the analytical measurement range.      FERRITIN 8 (L) 02/05/2024      Lab Results   Component Value Date    AFXLVKSO21 364 02/05/2024      Lab Results   Component Value Date    FOLATE 8.6 02/05/2024     Lab Results   Component Value Date    KELLY NEGATIVE 08/03/2021    RF <10 08/03/2021    SEDRATE 10 02/05/2024      Lab Results   Component Value Date    CRP 0.69 02/05/2024        Lab Results   Component Value Date     02/05/2024     Lab Results   Component Value Date    HAPTOGLOBIN 152 02/05/2024     Lab Results   Component Value Date    SPEP NORMAL 08/03/2021     Lab Results   Component Value Date    WBC 7.4 11/06/2023    NEUTROABS 5.00 11/06/2023    IGABSOL 0.04 11/06/2023    LYMPHSABS 1.23 11/06/2023    MONOSABS 0.51 11/06/2023    EOSABS 0.55 11/06/2023    BASOSABS 0.05 11/06/2023    RBC 4.65 11/06/2023    MCV 75 (L) 11/06/2023    MCHC 28.0 (L) 11/06/2023    HGB 9.7 (L) 11/06/2023    HCT 34.7 (L) 11/06/2023     11/06/2023     Lab Results   Component Value Date    RETICCTPCT 1.5 08/03/2021      Lab Results   Component Value Date    CREATININE 0.90 06/14/2021    BUN 14 06/14/2021     (L) 06/14/2021    K 3.5 06/14/2021     06/14/2021    CO2 24 06/14/2021      Lab Results   Component Value Date    ALT 79 (H) 07/17/2023    AST 51 (H) 07/17/2023    ALKPHOS 62 07/17/2023    BILITOT 0.7 07/17/2023      Lab  Results   Component Value Date    TSH 1.67 06/14/2021     Lab Results   Component Value Date    TSH 1.67 06/14/2021     Lab Results   Component Value Date    IRON 21 (L) 11/06/2023    TIBC  11/06/2023      Comment:      One or more of the analytes used in this calculation is outside of the analytical measurement range.      FERRITIN 21 11/06/2023      Lab Results   Component Value Date    CFSGYBAP58 632 09/18/2023      Lab Results   Component Value Date    FOLATE 8.9 09/18/2023     Lab Results   Component Value Date    KELLY NEGATIVE 08/03/2021    RF <10 08/03/2021      Lab Results   Component Value Date     08/03/2021     Lab Results   Component Value Date    SPEP NORMAL 08/03/2021     Radiology/Studies:   CT chest abdomen pelvis w IV contrast  12/27/2022  IMPRESSION:  Large hiatal hernia similar to prior.  Hepatomegaly and diffuse fatty infiltration of the liver. Splenomegaly.   Colonoscopy/EGD   10/27/2023  Impression  No impression generated  Findings  Retained solid stool in the rectosigmoid colon  Recommendation    Prior colonoscopy was normal and upper endoscopy found a source of anemia so repeat colonoscopy might not be indicated until age 45 or other indication such as FOBT      Impression  The esophagus appeared normal.  6 cm hiatal hernia with Sachin lesions present  The 1st part of the duodenum and 2nd part of the duodenum appeared normal. Performed random biopsy to rule out celiac disease.  Findings  The esophagus appeared normal.  6 cm hiatal hernia with Sachin lesions present - GE junction 34 cm from the incisors, diaphragmatic impression 40 cm from the incisors  The 1st part of the duodenum and 2nd part of the duodenum appeared normal. Performed random biopsy using biopsy forceps to rule out celiac disease.   Recommendation    Follow up with primary gastroenterologis     ASSESSMENT/PLAN     Assessment and Plan:   #1. Severe  iron deficiency anemia with microcytosis and thrombocytosis   In  6/2021 (6/2021 ferritin less than 8, hemoglobin 4s) with negative stool occult/urine analysis for blood and no visible bleeding, 6/2021 status post 3 units packed red blood cells and started on ferrous sulfate, 6/2021 reticulocyte count low but subsequently elevated possibly due to starting iron, normal CBC 7/2015, no prior history of anemia prior to 6/2021.  6/2021 patient presented with symptoms of cough, fatigue, near syncope.  No evidence of hemolysis with normal bilirubin.  B12 was only 259 , 6/2021 started on B12 injections from PCP; MCV was low likely  due to the iron deficiency, can see a mixed picture.  8/4/2021 hemoglobin normalized along with MCV, iron still low but hemoglobin normal so he was likely absorbing some and maintained on oral  iron, also diagnosed with folate deficiency and started on supplement.  8/4/2021 normal/negative B1/B6/copper/zinc, LDH, methylmalonic  acid, JAK2, rheumatoid factor, HIV, hepatitis, KELLY, celiac, lead, flow cytometry, SPEP, intrinsic factor antibody, parietal cell antibody. On 10/14/21 EGD and colonoscopy (done with a gluten challenge with negative duodenal biopsy, so he did not appear to have clinical celiac disease) with normal colonoscopy, EGD with LA grade B reflux esophagitis without bleeding, 3 cm hiatal hernia,  multiple Sachin's ulcers, acute gastritis; pathology showed unremarkable duodenal biopsy, stomach with reactive gastropathy but negative H. pylori; therefore, he could have a combination of malabsorption (was started on a PPI 6/2021 but that was after he presented with anemia) as well as blood loss from the Sachin's ulcers.      December 2022 iron studies were persistently  low and he eventually agreed to Feraheme which was given 1/2022, and actually February 2022 iron studies normalized with hemoglobin up at the upper limit of normal.  Gastroenterology recommended no further endoscopy 7/18/2022 .     8/2022 he discontinued his oral iron given likely  ineffectiveness.  Despite being out of his folate his level was decent 12/2022, subsequently restarted back on his folate.  Iron deficiency again receiving IV iron multiple times December 2022-April 2023 with hemoglobin down to 9.9 and MCV down to 76 in March 2023 consistent with severe iron deficiency, no visible bleeding.    CBC and iron studies improved after IV iron April 2023.  July 2023 hemoglobin down to 10.1 with clear iron deficiency status post IV iron again, still iron deficient.  September 2023 but he refused IV iron after multiple unsuccessful IV attempts.     On 10/27/2023 Dr. Pfeiffer performed a colonoscopy showing retained stool with prior colonoscopy normal and same-day EGD with source of bleeding so likely not necessary to redo, EGD with 6 cm hiatal hernia with Sachin's lesions, normal-appearing duodenum with biopsy pending; felt iron deficiency was secondary to hiatal hernia with Sachin's erosions, appointment with Dr. Hyatt general surgery 12/4/2023.  Erythropoietin level was elevated 7/2023 likely due to iron deficiency.  Normal B12 and folate most recently September 2023.  NEGATIVE HEPATITIS PANEL 8/2021.     Borderline polycythemia discovered once iron studies collected to 16.8 on 5/2/2022 with no known history of any cardiopulmonary issues or sleep apnea but does have the body habitus predisposing apnea.  Negative myeloid next generation sequencing panel  8/2022 with erythropoietin slightly elevated at 32 likely secondary polycythemia, seen by pulmonary 9/2022 with no evidence of a pulmonary disorder.  Erythropoietin elevated up to 90 December 2022 with CT chest/abdomen/pelvis done to check for malignancy  which was unremarkable except for hepatosplenomegaly.    Normal testosterone 12/2023.  July 2023 anemic with elevated erythropoietin likely due to the iron deficiency.    June 2021, thrombocytosis likely secondary to iron deficiency with most platelet count normal on 6/23/2021.  Negative  myeloid next generation sequencing panel 8/2022.  Platelet count normal on most recent labs.     On 2/5/2024 hemoglobin down to 7.3, MCV 70.  Corrected reticulocyte count is 1.7 showing hypoproliferation.  Showing the bone marrow inability to produce red blood cells adequately.  Hemoglobin identification has been drawn and pending along with additional labs to assess for hemolytic anemia.  LDH, haptoglobin, and bilirubin panels pending. Flow Cytometry in process.  Patient very pale.  Though denies any symptoms other than his normal baseline.  Patient severely iron deficient unable to detect a TIBC or saturation.  Iron 16, UIBC over 450.  Ferritin below 8.  Notably B12 is 364, monthly B12 injections have been ordered.  Folate low at 8.6, please take folic acid 1 mg daily.    #2. Mild Splenomegaly   Mild splenomegaly on imaging 15.2 cm by CT 6/2021, could be due to possible fatty liver although not mentioned on imaging but does have significant obesity, mild upper abdominal pain seemed unlikely related to the mild splenomegaly especially as it seemed better in 2022.  Negative myeloid next generation sequencing panel 8/2022.   Gastroenterology recommended no further evaluation of this 7/18/2022.  Hepatosplenomegaly on imaging 12/2022.  Dr. Pfeiffer with hepatology saw him 5/19/2023 with labs and FibroScan ordered along with weight loss program, likely NAFLD-he has not done any  of this testing yet except for labs January 2023 with persistently elevated transaminases, normal ceruloplasmin and A1 AT, negative hepatitis B core antibody, positive hepatitis A antibody total.     #3. LFTS elevated  History of abnormal LFTs in [id number] but normal 6/2021.  Abnormal LFTs 7/2023 as above.  Calcium 8.4, LFTs normal on labs 2/5/2024.      I have reviewed the patient's medical record including provider notes, laboratory and testing results, imaging, and procedures available within the system and outside the system.     Follow  up:    RTC:  -Next week for IV iron infusion  -3-month follow-up with labs 5/6/2024 or sooner as needed.  I will call to discuss labs when they return.    Medications:  -Feraheme ordered x 2 doses  -Monthly B12 injections ordered and scheduled  -Over-the-counter folic acid 1 mg daily    Imaging/Testing:  -No imaging ordered today    Referral:  -No specific referral today, please follow your specialties such as GI and general surgery    Other Pertinent Appointments:  -Iron infusions 2/12/2024 and 2/19/2024  -General surgery 2/19/2024      Patient Discussion Summary:  Discussed the plan of care with the patient.  He is understanding.  Patient needs iron infusions soon as possible due to trending down hemoglobin.  If he were going further he will need a blood transfusion.  Please follow with GI and additional specialties as scheduled he states understanding and agreement.  Answered all questions to his liking.  Discussed healthy modifiable lifestyle changes such as healthy diet and types of food to eat, meeting with a dietitian, incorporating daily activity, even if it is active walking daily.  See importance of activity along with healthy diet to reduce weight gain.  He will call with any questions or concerns.    Thank you for allowing me to participate in your care. It was a pleasure meeting you.    Sincerely,  Bev Robles, APRN-CNP      This document may have been written by voice recognition software.   Time based billing: Please see documentation within this chart

## 2024-02-05 ENCOUNTER — HOSPITAL ENCOUNTER (OUTPATIENT)
Dept: CARDIOLOGY | Facility: HOSPITAL | Age: 29
Discharge: HOME | End: 2024-02-05
Payer: COMMERCIAL

## 2024-02-05 ENCOUNTER — OFFICE VISIT (OUTPATIENT)
Dept: CARDIOLOGY | Facility: CLINIC | Age: 29
End: 2024-02-05
Payer: COMMERCIAL

## 2024-02-05 ENCOUNTER — INFUSION (OUTPATIENT)
Dept: HEMATOLOGY/ONCOLOGY | Facility: HOSPITAL | Age: 29
End: 2024-02-05
Payer: COMMERCIAL

## 2024-02-05 ENCOUNTER — OFFICE VISIT (OUTPATIENT)
Dept: HEMATOLOGY/ONCOLOGY | Facility: HOSPITAL | Age: 29
End: 2024-02-05
Payer: COMMERCIAL

## 2024-02-05 VITALS
DIASTOLIC BLOOD PRESSURE: 86 MMHG | BODY MASS INDEX: 47.27 KG/M2 | OXYGEN SATURATION: 98 % | HEART RATE: 93 BPM | WEIGHT: 315 LBS | SYSTOLIC BLOOD PRESSURE: 161 MMHG

## 2024-02-05 VITALS
DIASTOLIC BLOOD PRESSURE: 89 MMHG | SYSTOLIC BLOOD PRESSURE: 148 MMHG | RESPIRATION RATE: 16 BRPM | TEMPERATURE: 98.1 F | OXYGEN SATURATION: 97 % | HEART RATE: 99 BPM | WEIGHT: 315 LBS | BODY MASS INDEX: 40.43 KG/M2 | HEIGHT: 74 IN

## 2024-02-05 DIAGNOSIS — R03.0 ELEVATED BLOOD PRESSURE READING: Primary | ICD-10-CM

## 2024-02-05 DIAGNOSIS — E53.8 B12 DEFICIENCY: Primary | ICD-10-CM

## 2024-02-05 DIAGNOSIS — E53.8 B12 DEFICIENCY: ICD-10-CM

## 2024-02-05 DIAGNOSIS — E66.01 MORBID OBESITY WITH BMI OF 45.0-49.9, ADULT (MULTI): ICD-10-CM

## 2024-02-05 DIAGNOSIS — Z01.818 PREOPERATIVE CLEARANCE: ICD-10-CM

## 2024-02-05 DIAGNOSIS — K90.9 IRON MALABSORPTION (HHS-HCC): ICD-10-CM

## 2024-02-05 DIAGNOSIS — Z98.84 BARIATRIC SURGERY STATUS: ICD-10-CM

## 2024-02-05 DIAGNOSIS — Z00.00 HEALTHCARE MAINTENANCE: ICD-10-CM

## 2024-02-05 DIAGNOSIS — E78.5 DYSLIPIDEMIA: ICD-10-CM

## 2024-02-05 DIAGNOSIS — E53.8 FOLATE DEFICIENCY: ICD-10-CM

## 2024-02-05 DIAGNOSIS — D64.89 ANEMIA DUE TO OTHER CAUSE, NOT CLASSIFIED: ICD-10-CM

## 2024-02-05 DIAGNOSIS — Z86.2 H/O IRON DEFICIENCY ANEMIA: ICD-10-CM

## 2024-02-05 LAB
ALBUMIN SERPL BCP-MCNC: 4.1 G/DL (ref 3.4–5)
ALP SERPL-CCNC: 54 U/L (ref 33–120)
ALT SERPL W P-5'-P-CCNC: 37 U/L (ref 10–52)
ANION GAP SERPL CALC-SCNC: 10 MMOL/L (ref 10–20)
AST SERPL W P-5'-P-CCNC: 28 U/L (ref 9–39)
BASOPHILS # BLD AUTO: 0.04 X10*3/UL (ref 0–0.1)
BASOPHILS NFR BLD AUTO: 0.7 %
BILIRUB SERPL-MCNC: 0.6 MG/DL (ref 0–1.2)
BUN SERPL-MCNC: 7 MG/DL (ref 6–23)
CALCIUM SERPL-MCNC: 8.4 MG/DL (ref 8.6–10.3)
CHLORIDE SERPL-SCNC: 108 MMOL/L (ref 98–107)
CO2 SERPL-SCNC: 24 MMOL/L (ref 21–32)
CREAT SERPL-MCNC: 0.85 MG/DL (ref 0.5–1.3)
CRP SERPL-MCNC: 0.69 MG/DL
EGFRCR SERPLBLD CKD-EPI 2021: >90 ML/MIN/1.73M*2
EOSINOPHIL # BLD AUTO: 0.49 X10*3/UL (ref 0–0.7)
EOSINOPHIL NFR BLD AUTO: 8 %
ERYTHROCYTE [DISTWIDTH] IN BLOOD BY AUTOMATED COUNT: 16.1 % (ref 11.5–14.5)
ERYTHROCYTE [SEDIMENTATION RATE] IN BLOOD BY WESTERGREN METHOD: 10 MM/H (ref 0–15)
FERRITIN SERPL-MCNC: 8 NG/ML (ref 20–300)
GLUCOSE SERPL-MCNC: 120 MG/DL (ref 74–99)
HCT VFR BLD AUTO: 26.2 % (ref 41–52)
HGB BLD-MCNC: 7.3 G/DL (ref 13.5–17.5)
HGB RETIC QN: 14 PG (ref 28–38)
IMM GRANULOCYTES # BLD AUTO: 0.03 X10*3/UL (ref 0–0.7)
IMM GRANULOCYTES NFR BLD AUTO: 0.5 % (ref 0–0.9)
IMMATURE RETIC FRACTION: 23.9 %
IRON SATN MFR SERPL: ABNORMAL %
IRON SERPL-MCNC: 16 UG/DL (ref 35–150)
LDH SERPL L TO P-CCNC: 174 U/L (ref 84–246)
LYMPHOCYTES # BLD AUTO: 1 X10*3/UL (ref 1.2–4.8)
LYMPHOCYTES NFR BLD AUTO: 16.3 %
MCH RBC QN AUTO: 19.6 PG (ref 26–34)
MCHC RBC AUTO-ENTMCNC: 27.9 G/DL (ref 32–36)
MCV RBC AUTO: 70 FL (ref 80–100)
MONOCYTES # BLD AUTO: 0.47 X10*3/UL (ref 0.1–1)
MONOCYTES NFR BLD AUTO: 7.6 %
NEUTROPHILS # BLD AUTO: 4.12 X10*3/UL (ref 1.2–7.7)
NEUTROPHILS NFR BLD AUTO: 66.9 %
NRBC BLD-RTO: 0 /100 WBCS (ref 0–0)
PLATELET # BLD AUTO: 253 X10*3/UL (ref 150–450)
POTASSIUM SERPL-SCNC: 3.8 MMOL/L (ref 3.5–5.3)
PROT SERPL-MCNC: 6.4 G/DL (ref 6.4–8.2)
RBC # BLD AUTO: 3.72 X10*6/UL (ref 4.5–5.9)
RETICS #: 0.1 X10*6/UL (ref 0.02–0.12)
RETICS/RBC NFR AUTO: 2.8 % (ref 0.5–2)
SODIUM SERPL-SCNC: 138 MMOL/L (ref 136–145)
TIBC SERPL-MCNC: ABNORMAL UG/DL
TSH SERPL-ACNC: 1.68 MIU/L (ref 0.44–3.98)
UIBC SERPL-MCNC: >450 UG/DL (ref 110–370)
URATE SERPL-MCNC: 6.1 MG/DL (ref 4–7.5)
WBC # BLD AUTO: 6.2 X10*3/UL (ref 4.4–11.3)

## 2024-02-05 PROCEDURE — 99215 OFFICE O/P EST HI 40 MIN: CPT

## 2024-02-05 PROCEDURE — 88237 TISSUE CULTURE BONE MARROW: CPT

## 2024-02-05 PROCEDURE — 84443 ASSAY THYROID STIM HORMONE: CPT

## 2024-02-05 PROCEDURE — 82607 VITAMIN B-12: CPT | Mod: GENLAB

## 2024-02-05 PROCEDURE — 93010 ELECTROCARDIOGRAM REPORT: CPT | Performed by: INTERNAL MEDICINE

## 2024-02-05 PROCEDURE — 83020 HEMOGLOBIN ELECTROPHORESIS: CPT | Performed by: PATHOLOGY

## 2024-02-05 PROCEDURE — 85025 COMPLETE CBC W/AUTO DIFF WBC: CPT

## 2024-02-05 PROCEDURE — 83529 ASAY OF INTERLEUKIN-6 (IL-6): CPT

## 2024-02-05 PROCEDURE — 85652 RBC SED RATE AUTOMATED: CPT

## 2024-02-05 PROCEDURE — 84550 ASSAY OF BLOOD/URIC ACID: CPT

## 2024-02-05 PROCEDURE — 86340 INTRINSIC FACTOR ANTIBODY: CPT

## 2024-02-05 PROCEDURE — 83615 LACTATE (LD) (LDH) ENZYME: CPT

## 2024-02-05 PROCEDURE — 96372 THER/PROPH/DIAG INJ SC/IM: CPT

## 2024-02-05 PROCEDURE — 36415 COLL VENOUS BLD VENIPUNCTURE: CPT

## 2024-02-05 PROCEDURE — 82960 TEST FOR G6PD ENZYME: CPT | Mod: GENLAB

## 2024-02-05 PROCEDURE — 83540 ASSAY OF IRON: CPT

## 2024-02-05 PROCEDURE — 1036F TOBACCO NON-USER: CPT | Performed by: NURSE PRACTITIONER

## 2024-02-05 PROCEDURE — 82728 ASSAY OF FERRITIN: CPT

## 2024-02-05 PROCEDURE — 3008F BODY MASS INDEX DOCD: CPT | Performed by: NURSE PRACTITIONER

## 2024-02-05 PROCEDURE — 83010 ASSAY OF HAPTOGLOBIN QUANT: CPT

## 2024-02-05 PROCEDURE — 86140 C-REACTIVE PROTEIN: CPT

## 2024-02-05 PROCEDURE — 88185 FLOWCYTOMETRY/TC ADD-ON: CPT | Mod: TC,GENLAB

## 2024-02-05 PROCEDURE — 83021 HEMOGLOBIN CHROMOTOGRAPHY: CPT | Mod: GENLAB

## 2024-02-05 PROCEDURE — 82668 ASSAY OF ERYTHROPOIETIN: CPT

## 2024-02-05 PROCEDURE — 82746 ASSAY OF FOLIC ACID SERUM: CPT | Mod: GENLAB

## 2024-02-05 PROCEDURE — 3008F BODY MASS INDEX DOCD: CPT

## 2024-02-05 PROCEDURE — 82248 BILIRUBIN DIRECT: CPT

## 2024-02-05 PROCEDURE — 93005 ELECTROCARDIOGRAM TRACING: CPT

## 2024-02-05 PROCEDURE — 1036F TOBACCO NON-USER: CPT

## 2024-02-05 PROCEDURE — 85045 AUTOMATED RETICULOCYTE COUNT: CPT

## 2024-02-05 PROCEDURE — 88189 FLOWCYTOMETRY/READ 16 & >: CPT | Performed by: PATHOLOGY

## 2024-02-05 PROCEDURE — 2500000004 HC RX 250 GENERAL PHARMACY W/ HCPCS (ALT 636 FOR OP/ED): Mod: SE

## 2024-02-05 PROCEDURE — 80053 COMPREHEN METABOLIC PANEL: CPT

## 2024-02-05 PROCEDURE — 99204 OFFICE O/P NEW MOD 45 MIN: CPT | Performed by: NURSE PRACTITIONER

## 2024-02-05 RX ORDER — FAMOTIDINE 10 MG/ML
20 INJECTION INTRAVENOUS ONCE AS NEEDED
Status: DISCONTINUED | OUTPATIENT
Start: 2024-02-05 | End: 2024-02-05 | Stop reason: HOSPADM

## 2024-02-05 RX ORDER — FAMOTIDINE 10 MG/ML
20 INJECTION INTRAVENOUS ONCE AS NEEDED
Status: CANCELLED | OUTPATIENT
Start: 2024-03-04

## 2024-02-05 RX ORDER — CYANOCOBALAMIN 1000 UG/ML
1000 INJECTION, SOLUTION INTRAMUSCULAR; SUBCUTANEOUS ONCE
Status: COMPLETED | OUTPATIENT
Start: 2024-02-05 | End: 2024-02-05

## 2024-02-05 RX ORDER — ALBUTEROL SULFATE 0.83 MG/ML
3 SOLUTION RESPIRATORY (INHALATION) AS NEEDED
Status: DISCONTINUED | OUTPATIENT
Start: 2024-02-05 | End: 2024-02-05 | Stop reason: HOSPADM

## 2024-02-05 RX ORDER — NEBULIZER AND COMPRESSOR
1 EACH MISCELLANEOUS DAILY
Qty: 1 EACH | Refills: 0 | Status: SHIPPED | OUTPATIENT
Start: 2024-02-05

## 2024-02-05 RX ORDER — DIPHENHYDRAMINE HYDROCHLORIDE 50 MG/ML
50 INJECTION INTRAMUSCULAR; INTRAVENOUS AS NEEDED
Status: CANCELLED | OUTPATIENT
Start: 2024-03-04

## 2024-02-05 RX ORDER — EPINEPHRINE 0.3 MG/.3ML
0.3 INJECTION SUBCUTANEOUS EVERY 5 MIN PRN
Status: DISCONTINUED | OUTPATIENT
Start: 2024-02-05 | End: 2024-02-05 | Stop reason: HOSPADM

## 2024-02-05 RX ORDER — ALBUTEROL SULFATE 0.83 MG/ML
3 SOLUTION RESPIRATORY (INHALATION) AS NEEDED
Status: CANCELLED | OUTPATIENT
Start: 2024-03-04

## 2024-02-05 RX ORDER — DIPHENHYDRAMINE HYDROCHLORIDE 50 MG/ML
50 INJECTION INTRAMUSCULAR; INTRAVENOUS AS NEEDED
Status: DISCONTINUED | OUTPATIENT
Start: 2024-02-05 | End: 2024-02-05 | Stop reason: HOSPADM

## 2024-02-05 RX ORDER — EPINEPHRINE 0.3 MG/.3ML
0.3 INJECTION SUBCUTANEOUS EVERY 5 MIN PRN
Status: CANCELLED | OUTPATIENT
Start: 2024-03-04

## 2024-02-05 RX ORDER — CYANOCOBALAMIN 1000 UG/ML
1000 INJECTION, SOLUTION INTRAMUSCULAR; SUBCUTANEOUS ONCE
Status: CANCELLED | OUTPATIENT
Start: 2024-03-04

## 2024-02-05 RX ORDER — LISINOPRIL 10 MG/1
10 TABLET ORAL DAILY
Qty: 30 TABLET | Refills: 11 | Status: SHIPPED | OUTPATIENT
Start: 2024-02-05 | End: 2025-02-04

## 2024-02-05 RX ADMIN — CYANOCOBALAMIN 1000 MCG: 1000 INJECTION, SOLUTION INTRAMUSCULAR at 13:20

## 2024-02-05 ASSESSMENT — PAIN SCALES - GENERAL: PAINLEVEL: 0-NO PAIN

## 2024-02-05 ASSESSMENT — PATIENT HEALTH QUESTIONNAIRE - PHQ9
SUM OF ALL RESPONSES TO PHQ9 QUESTIONS 1 & 2: 0
2. FEELING DOWN, DEPRESSED OR HOPELESS: NOT AT ALL
1. LITTLE INTEREST OR PLEASURE IN DOING THINGS: NOT AT ALL

## 2024-02-05 NOTE — PATIENT INSTRUCTIONS
If your BP stays above 140/85 start the lisinopril / hydrochlorothiazide      Home BP monitoring instructions:::: Goal < 130 / 80   Remain still, Avoid smoking, caffeinated beverages, or exercise within 30 min before BP measurements.  Ensure 5 min of quiet rest before BP measurements.  Sit correctly with back straight and supported (on a straight-backed dining chair, for example, rather than a sofa).  Sit with feet flat on the floor and legs uncrossed.  Keep arm supported on a flat surface (such as a table), with the upper arm at heart level.  Bottom of the cuff should be placed directly above the bend of the elbow  Take a reading in the AM before breakfast 2-3 times / week   Record all readings accurately:  A written log should be brought to all appointments   Monitors with built-in memory should be brought to all clinic appointments and calibrated to our machines

## 2024-02-05 NOTE — LETTER
February 5, 2024     Dasha Hyatt MD MPH  [id number]  Bariatric Lab  [facility] [address]    Patient: Melvin Foley   YOB: 1995   Date of Visit: 2/5/2024       Dear Dr. Dasha Hyatt MD MPH:    Thank you for referring Melvin Foley to me for evaluation. Below are my notes for this consultation.  If you have questions, please do not hesitate to call me. I look forward to following your patient along with you.       Sincerely,     Dora Mckinley, APRN-CNP      CC: No Recipients  ______________________________________________________________________________________    Primary Care Physician: Nehemiah Ibarra MD  Primary Cardiologist:      Date of Visit: 02/05/2024  2:00 PM EST  Location of visit: [address] MAIN   Type of Visit: New Patient        Chief Complaint   Patient presents with   • New Patient Visit     Bariatric surgery clearance, no concerns       HPI / Summary:   Melvin Foley is a 28 y.o. male who presents to establish cardiac care   He denies personal or family history of cardiac disease.  Currently being treated for anemia     He is able to climb stairs and walk indefinitely without chest discomfort or shortness of breath   Sleep study is being scheduled, denies prior KRISH        SOC: never smokes Rare ETOH , no drugs   12 system review is negative except as noted above       Medical History:   Past Medical History:   Diagnosis Date   • B12 deficiency 09/29/2023   • GERD (gastroesophageal reflux disease)    • Hyperlipidemia, unspecified 06/10/2021    Dyslipidemia   • Iron malabsorption 09/19/2023   • Personal history of diseases of the blood and blood-forming organs and certain disorders involving the immune mechanism 06/23/2021    History of anemia       Surgical History:   Past Surgical History:   Procedure Laterality Date   • COLONOSCOPY     • DENTAL SURGERY     • OTHER SURGICAL HISTORY  06/10/2021    Knee surgery   • UPPER GASTROINTESTINAL ENDOSCOPY         Family History:   Family  History   Problem Relation Name Age of Onset   • Arthritis Mother     • Depression Mother     • Hypertension Father         Social History:   Tobacco Use: Low Risk  (2/5/2024)    Patient History    • Smoking Tobacco Use: Never    • Smokeless Tobacco Use: Never    • Passive Exposure: Never             MEDICATIONS:   Current Outpatient Medications   Medication Instructions   • cyanocobalamin (Vitamin B-12) 1,000 mcg/mL injection Every 30 days   • EPINEPHrine (EpiPen 2-Adam) 0.3 mg/0.3 mL injection syringe USE AS DIRECTED IN CASE OF A SEVERE ALLERGIC REACTION   • folic acid (Folvite) 1 mg tablet 1 tablet, oral, Daily   • lisinopril 10 mg, oral, Daily   • miscellaneous medical supply (Blood Pressure Cuff) misc 1 kit, miscellaneous, Daily   • omeprazole (PriLOSEC) 40 mg DR capsule 1 capsule, oral, Daily         IMAGING REVIEWED: NONE         LABS:  CBC:   Lab Results   Component Value Date    WBC 6.2 02/05/2024    RBC 3.72 (L) 02/05/2024    HGB 7.3 (L) 02/05/2024    HCT 26.2 (L) 02/05/2024    MCV 70 (L) 02/05/2024    MCH 19.6 (L) 02/05/2024    MCHC 27.9 (L) 02/05/2024    RDW 16.1 (H) 02/05/2024     02/05/2024     CBC with Differential:    Lab Results   Component Value Date    WBC 6.2 02/05/2024    RBC 3.72 (L) 02/05/2024    HGB 7.3 (L) 02/05/2024    HCT 26.2 (L) 02/05/2024     02/05/2024    MCV 70 (L) 02/05/2024    MCH 19.6 (L) 02/05/2024    MCHC 27.9 (L) 02/05/2024    RDW 16.1 (H) 02/05/2024    NRBC 0.0 02/05/2024    LYMPHOPCT 16.3 02/05/2024    MONOPCT 7.6 02/05/2024    EOSPCT 8.0 02/05/2024    BASOPCT 0.7 02/05/2024    MONOSABS 0.47 02/05/2024    LYMPHSABS 1.00 (L) 02/05/2024    EOSABS 0.49 02/05/2024    BASOSABS 0.04 02/05/2024     CMP:    Lab Results   Component Value Date     02/05/2024    K 3.8 02/05/2024     (H) 02/05/2024    CO2 24 02/05/2024    BUN 7 02/05/2024    CREATININE 0.85 02/05/2024    GLUCOSE 120 (H) 02/05/2024    PROT 6.4 02/05/2024    CALCIUM 8.4 (L) 02/05/2024    BILITOT 0.6  "02/05/2024    ALKPHOS 54 02/05/2024    AST 28 02/05/2024    ALT 37 02/05/2024     BMP:    Lab Results   Component Value Date     02/05/2024    K 3.8 02/05/2024     (H) 02/05/2024    CO2 24 02/05/2024    BUN 7 02/05/2024    CREATININE 0.85 02/05/2024    CALCIUM 8.4 (L) 02/05/2024    GLUCOSE 120 (H) 02/05/2024     Magnesium:  Lab Results   Component Value Date    MG 1.92 06/14/2021     Troponin:  No results found for: \"TROPHS\"  BNP:   Lab Results   Component Value Date    BNP 47 06/14/2021       Lipid Panel:  Lab Results   Component Value Date    HDL 36.9 (A) 06/14/2021    CHHDL 4.5 06/14/2021    VLDL 15 06/14/2021    TRIG 77 06/14/2021        Lab work and imaging results independently reviewed by me         Visit Vitals  /86   Pulse 93   Wt (!) 167 kg (368 lb 3.2 oz)   SpO2 98%   BMI 47.27 kg/m²   Smoking Status Never   BSA 2.95 m²          ECG dated 2/05/2024 independently reviewed   NSR 88       Constitutional:       Appearance: Healthy appearance. Not in distress. Morbidly obese.   Eyes:      Conjunctiva/sclera: Conjunctivae normal.   Neck:      Vascular: JVD normal.   Pulmonary:      Effort: Pulmonary effort is normal.      Breath sounds: Normal breath sounds.   Cardiovascular:      PMI at left midclavicular line. Normal rate. Regular rhythm. Normal S1. Normal S2.       Murmurs: There is no murmur.      No rub.   Pulses:     Intact distal pulses.   Edema:     Peripheral edema absent.   Abdominal:      General: Bowel sounds are normal.   Musculoskeletal:      Cervical back: Neck supple. Skin:     General: Skin is warm and dry.   Neurological:      Mental Status: Alert and oriented to person, place and time.               Problem List Items Addressed This Visit             ICD-10-CM    Dyslipidemia E78.5    Bariatric surgery status Z98.84    Relevant Orders    ECG 12 lead (Ancillary Performed) (Completed)    Preoperative clearance Z01.818    Relevant Orders    ECG 12 lead (Ancillary Performed) " (Completed)    Morbid obesity with BMI of 45.0-49.9, adult (CMS/[facility]) E66.01, Z68.42    Elevated blood pressure reading - Primary R03.0    Relevant Medications    miscellaneous medical supply (Blood Pressure Cuff) misc    lisinopril 10 mg tablet     Other Visit Diagnoses         Codes    Healthcare maintenance     Z00.00    Relevant Orders    Referral to Primary Care          Home BP monitoring and will start Lisinopril- hydrochlorothiazide if readings remain elevated   RCRI is 1 (intra-abdominal surgery)  indicating 6.0 % risk of 30 day Major adverse cardiac event   ECG is NSR without dynamic ST / T wave abnormalities   Ok for bariatric / hernia repair surgery with acceptable cardiac risk     Referral to primary care       02/05/24 at 3:11 PM - STIVEN Fuller        Followup Appts:  Future Appointments   Date Time Provider Department Center   2/19/2024  1:30 PM Ivon Ramos RDN, BLADIMIR [id number] [facility]   3/4/2024 12:00 PM INF 03 GENEVA GENSCCINF [facility]   4/1/2024 12:00 PM INF 03 [facility]   5/6/2024 12:30 PM INF 03 [facility]   5/6/2024  1:00 PM STIVEN Ortiz [facility] [place]

## 2024-02-05 NOTE — PROGRESS NOTES
Primary Care Physician: Nehemiah Ibarra MD  Primary Cardiologist:      Date of Visit: 02/05/2024  2:00 PM EST  Location of visit:  [address] MAIN   Type of Visit: New Patient        Chief Complaint   Patient presents with    New Patient Visit     Bariatric surgery clearance, no concerns       HPI / Summary:   Melvin Foley is a 28 y.o. male who presents to establish cardiac care   He denies personal or family history of cardiac disease.  Currently being treated for anemia     He is able to climb stairs and walk indefinitely without chest discomfort or shortness of breath   Sleep study is being scheduled, denies prior KRISH        SOC: never smokes Rare ETOH , no drugs   12 system review is negative except as noted above       Medical History:   Past Medical History:   Diagnosis Date    B12 deficiency 09/29/2023    GERD (gastroesophageal reflux disease)     Hyperlipidemia, unspecified 06/10/2021    Dyslipidemia    Iron malabsorption 09/19/2023    Personal history of diseases of the blood and blood-forming organs and certain disorders involving the immune mechanism 06/23/2021    History of anemia       Surgical History:   Past Surgical History:   Procedure Laterality Date    COLONOSCOPY      DENTAL SURGERY      OTHER SURGICAL HISTORY  06/10/2021    Knee surgery    UPPER GASTROINTESTINAL ENDOSCOPY         Family History:   Family History   Problem Relation Name Age of Onset    Arthritis Mother      Depression Mother      Hypertension Father         Social History:   Tobacco Use: Low Risk  (2/5/2024)    Patient History     Smoking Tobacco Use: Never     Smokeless Tobacco Use: Never     Passive Exposure: Never             MEDICATIONS:   Current Outpatient Medications   Medication Instructions    cyanocobalamin (Vitamin B-12) 1,000 mcg/mL injection Every 30 days    EPINEPHrine (EpiPen 2-Adam) 0.3 mg/0.3 mL injection syringe USE AS DIRECTED IN CASE OF A SEVERE ALLERGIC REACTION    folic acid (Folvite) 1 mg tablet 1 tablet,  "oral, Daily    lisinopril 10 mg, oral, Daily    miscellaneous medical supply (Blood Pressure Cuff) misc 1 kit, miscellaneous, Daily    omeprazole (PriLOSEC) 40 mg DR capsule 1 capsule, oral, Daily         IMAGING REVIEWED: NONE         LABS:  CBC:   Lab Results   Component Value Date    WBC 6.2 02/05/2024    RBC 3.72 (L) 02/05/2024    HGB 7.3 (L) 02/05/2024    HCT 26.2 (L) 02/05/2024    MCV 70 (L) 02/05/2024    MCH 19.6 (L) 02/05/2024    MCHC 27.9 (L) 02/05/2024    RDW 16.1 (H) 02/05/2024     02/05/2024     CBC with Differential:    Lab Results   Component Value Date    WBC 6.2 02/05/2024    RBC 3.72 (L) 02/05/2024    HGB 7.3 (L) 02/05/2024    HCT 26.2 (L) 02/05/2024     02/05/2024    MCV 70 (L) 02/05/2024    MCH 19.6 (L) 02/05/2024    MCHC 27.9 (L) 02/05/2024    RDW 16.1 (H) 02/05/2024    NRBC 0.0 02/05/2024    LYMPHOPCT 16.3 02/05/2024    MONOPCT 7.6 02/05/2024    EOSPCT 8.0 02/05/2024    BASOPCT 0.7 02/05/2024    MONOSABS 0.47 02/05/2024    LYMPHSABS 1.00 (L) 02/05/2024    EOSABS 0.49 02/05/2024    BASOSABS 0.04 02/05/2024     CMP:    Lab Results   Component Value Date     02/05/2024    K 3.8 02/05/2024     (H) 02/05/2024    CO2 24 02/05/2024    BUN 7 02/05/2024    CREATININE 0.85 02/05/2024    GLUCOSE 120 (H) 02/05/2024    PROT 6.4 02/05/2024    CALCIUM 8.4 (L) 02/05/2024    BILITOT 0.6 02/05/2024    ALKPHOS 54 02/05/2024    AST 28 02/05/2024    ALT 37 02/05/2024     BMP:    Lab Results   Component Value Date     02/05/2024    K 3.8 02/05/2024     (H) 02/05/2024    CO2 24 02/05/2024    BUN 7 02/05/2024    CREATININE 0.85 02/05/2024    CALCIUM 8.4 (L) 02/05/2024    GLUCOSE 120 (H) 02/05/2024     Magnesium:  Lab Results   Component Value Date    MG 1.92 06/14/2021     Troponin:  No results found for: \"TROPHS\"  BNP:   Lab Results   Component Value Date    BNP 47 06/14/2021       Lipid Panel:  Lab Results   Component Value Date    HDL 36.9 (A) 06/14/2021    CHHDL 4.5 06/14/2021 "    VLDL 15 06/14/2021    TRIG 77 06/14/2021        Lab work and imaging results independently reviewed by me         Visit Vitals  /86   Pulse 93   Wt (!) 167 kg (368 lb 3.2 oz)   SpO2 98%   BMI 47.27 kg/m²   Smoking Status Never   BSA 2.95 m²          ECG dated 2/05/2024 independently reviewed   NSR 88       Constitutional:       Appearance: Healthy appearance. Not in distress. Morbidly obese.   Eyes:      Conjunctiva/sclera: Conjunctivae normal.   Neck:      Vascular: JVD normal.   Pulmonary:      Effort: Pulmonary effort is normal.      Breath sounds: Normal breath sounds.   Cardiovascular:      PMI at left midclavicular line. Normal rate. Regular rhythm. Normal S1. Normal S2.       Murmurs: There is no murmur.      No rub.   Pulses:     Intact distal pulses.   Edema:     Peripheral edema absent.   Abdominal:      General: Bowel sounds are normal.   Musculoskeletal:      Cervical back: Neck supple. Skin:     General: Skin is warm and dry.   Neurological:      Mental Status: Alert and oriented to person, place and time.               Problem List Items Addressed This Visit             ICD-10-CM    Dyslipidemia E78.5    Bariatric surgery status Z98.84    Relevant Orders    ECG 12 lead (Ancillary Performed) (Completed)    Preoperative clearance Z01.818    Relevant Orders    ECG 12 lead (Ancillary Performed) (Completed)    Morbid obesity with BMI of 45.0-49.9, adult (CMS/[facility]) E66.01, Z68.42    Elevated blood pressure reading - Primary R03.0    Relevant Medications    miscellaneous medical supply (Blood Pressure Cuff) misc    lisinopril 10 mg tablet     Other Visit Diagnoses         Codes    Healthcare maintenance     Z00.00    Relevant Orders    Referral to Primary Care          Home BP monitoring and will start Lisinopril- hydrochlorothiazide if readings remain elevated   RCRI is 1 (intra-abdominal surgery)  indicating 6.0 % risk of 30 day Major adverse cardiac event   ECG is NSR without dynamic ST / T wave  abnormalities   Ok for bariatric / hernia repair surgery with acceptable cardiac risk     Referral to primary care       02/05/24 at 3:11 PM - STIVEN Fuller        Followup Appts:  Future Appointments   Date Time Provider Department Center   2/19/2024  1:30 PM Ivon Ramos RDN, LD [id number] [facility]   3/4/2024 12:00 PM [facility]   4/1/2024 12:00 PM [facility] 03 [place] [facility]   5/6/2024 12:30 PM [facility]   5/6/2024  1:00 PM STIVEN Ortiz [facility] [place]

## 2024-02-06 ENCOUNTER — TELEPHONE (OUTPATIENT)
Dept: HEMATOLOGY/ONCOLOGY | Facility: HOSPITAL | Age: 29
End: 2024-02-06
Payer: COMMERCIAL

## 2024-02-06 DIAGNOSIS — Z86.2 H/O IRON DEFICIENCY ANEMIA: Primary | ICD-10-CM

## 2024-02-06 DIAGNOSIS — K90.9 IRON MALABSORPTION (HHS-HCC): ICD-10-CM

## 2024-02-06 LAB
BILIRUB DIRECT SERPL-MCNC: 0.1 MG/DL (ref 0–0.3)
FOLATE SERPL-MCNC: 8.6 NG/ML
G6PD RBC QL: NORMAL
HAPTOGLOB SERPL-MCNC: 152 MG/DL (ref 37–246)
VIT B12 SERPL-MCNC: 364 PG/ML (ref 211–911)

## 2024-02-06 RX ORDER — FAMOTIDINE 10 MG/ML
20 INJECTION INTRAVENOUS ONCE AS NEEDED
Status: CANCELLED | OUTPATIENT
Start: 2024-02-08

## 2024-02-06 RX ORDER — ALBUTEROL SULFATE 0.83 MG/ML
3 SOLUTION RESPIRATORY (INHALATION) AS NEEDED
Status: CANCELLED | OUTPATIENT
Start: 2024-02-08

## 2024-02-06 RX ORDER — EPINEPHRINE 0.3 MG/.3ML
0.3 INJECTION SUBCUTANEOUS EVERY 5 MIN PRN
Status: CANCELLED | OUTPATIENT
Start: 2024-02-08

## 2024-02-06 RX ORDER — DIPHENHYDRAMINE HYDROCHLORIDE 50 MG/ML
50 INJECTION INTRAMUSCULAR; INTRAVENOUS AS NEEDED
Status: CANCELLED | OUTPATIENT
Start: 2024-02-08

## 2024-02-06 NOTE — TELEPHONE ENCOUNTER
Bev Robles, APRN-CNP ordered Feraheme x2 doses for patient. Pre-cert pending review. Awaiting confirmation for auth from pre-cert team. Reached out to patient to schedule. Patient requested for doses to be scheduled on Monday's due to work schedule. Scheduled for Monday, 2/12/24 at 12 PM. Patient verbalized understanding and agreed to appointment.

## 2024-02-07 LAB
CELL COUNT (BLOOD): 6.2 X10*3/UL
CELL POPULATIONS: NORMAL
CYTOGENETICS/MOLECULAR TEST ORDERED: NO
DIAGNOSIS: NORMAL
EPO SERPL-ACNC: 1928 MU/ML (ref 4–27)
FLOW DIFFERENTIAL: NORMAL
FLOW TEST ORDERED: NORMAL
HEMOGLOBIN A2: 2.3 % (ref 2–3.5)
HEMOGLOBIN A: 97.4 % (ref 95.8–98)
HEMOGLOBIN F: 0.3 % (ref 0–2)
HEMOGLOBIN IDENTIFICATION INTERPRETATION: NORMAL
IL6 SERPL-MCNC: <2 PG/ML
LAB TEST METHOD: NORMAL
NUMBER OF CELLS COLLECTED: NORMAL PER TUBE
PATH REPORT.TOTAL CANCER: NORMAL
PATH REVIEW-HGB IDENTIFICATION: NORMAL
RBC MORPH BLD: NORMAL
SIGNATURE COMMENT: NORMAL
SPECIMEN VIABILITY: NORMAL
WBC MORPH BLD: NORMAL

## 2024-02-08 LAB — IF BLOCK AB SER QL RIA: NEGATIVE

## 2024-02-12 ENCOUNTER — INFUSION (OUTPATIENT)
Dept: HEMATOLOGY/ONCOLOGY | Facility: HOSPITAL | Age: 29
End: 2024-02-12
Payer: COMMERCIAL

## 2024-02-12 VITALS
HEIGHT: 74 IN | WEIGHT: 315 LBS | RESPIRATION RATE: 17 BRPM | HEART RATE: 98 BPM | DIASTOLIC BLOOD PRESSURE: 77 MMHG | TEMPERATURE: 97.2 F | SYSTOLIC BLOOD PRESSURE: 119 MMHG | OXYGEN SATURATION: 98 % | BODY MASS INDEX: 40.43 KG/M2

## 2024-02-12 DIAGNOSIS — K90.9 IRON MALABSORPTION (HHS-HCC): ICD-10-CM

## 2024-02-12 DIAGNOSIS — Z86.2 H/O IRON DEFICIENCY ANEMIA: ICD-10-CM

## 2024-02-12 LAB
CHROM ANALY OVERALL INTERP-IMP: NORMAL
ELECTRONICALLY SIGNED BY CYTOGENETICS: NORMAL

## 2024-02-12 PROCEDURE — 2500000004 HC RX 250 GENERAL PHARMACY W/ HCPCS (ALT 636 FOR OP/ED): Mod: JZ,SE

## 2024-02-12 PROCEDURE — 96365 THER/PROPH/DIAG IV INF INIT: CPT | Mod: INF

## 2024-02-12 RX ORDER — FAMOTIDINE 10 MG/ML
20 INJECTION INTRAVENOUS ONCE AS NEEDED
Status: CANCELLED | OUTPATIENT
Start: 2024-02-19

## 2024-02-12 RX ORDER — EPINEPHRINE 0.3 MG/.3ML
0.3 INJECTION SUBCUTANEOUS EVERY 5 MIN PRN
Status: CANCELLED | OUTPATIENT
Start: 2024-02-19

## 2024-02-12 RX ORDER — ALBUTEROL SULFATE 0.83 MG/ML
3 SOLUTION RESPIRATORY (INHALATION) AS NEEDED
Status: CANCELLED | OUTPATIENT
Start: 2024-02-19

## 2024-02-12 RX ORDER — DIPHENHYDRAMINE HYDROCHLORIDE 50 MG/ML
50 INJECTION INTRAMUSCULAR; INTRAVENOUS AS NEEDED
Status: CANCELLED | OUTPATIENT
Start: 2024-02-19

## 2024-02-12 RX ADMIN — FERUMOXYTOL 510 MG: 510 INJECTION INTRAVENOUS at 12:24

## 2024-02-12 ASSESSMENT — PAIN SCALES - GENERAL: PAINLEVEL: 0-NO PAIN

## 2024-02-18 LAB
ATRIAL RATE: 88 BPM
P AXIS: 46 DEGREES
P OFFSET: 203 MS
P ONSET: 155 MS
PR INTERVAL: 132 MS
Q ONSET: 221 MS
QRS COUNT: 14 BEATS
QRS DURATION: 78 MS
QT INTERVAL: 336 MS
QTC CALCULATION(BAZETT): 406 MS
QTC FREDERICIA: 381 MS
R AXIS: 18 DEGREES
T AXIS: 43 DEGREES
T OFFSET: 389 MS
VENTRICULAR RATE: 88 BPM

## 2024-02-19 ENCOUNTER — APPOINTMENT (OUTPATIENT)
Dept: SURGERY | Facility: CLINIC | Age: 29
End: 2024-02-19
Payer: COMMERCIAL

## 2024-02-19 ENCOUNTER — APPOINTMENT (OUTPATIENT)
Dept: HEMATOLOGY/ONCOLOGY | Facility: HOSPITAL | Age: 29
End: 2024-02-19
Payer: COMMERCIAL

## 2024-02-26 ENCOUNTER — INFUSION (OUTPATIENT)
Dept: HEMATOLOGY/ONCOLOGY | Facility: HOSPITAL | Age: 29
End: 2024-02-26
Payer: COMMERCIAL

## 2024-02-26 VITALS
OXYGEN SATURATION: 97 % | BODY MASS INDEX: 46.73 KG/M2 | HEART RATE: 92 BPM | TEMPERATURE: 98.4 F | SYSTOLIC BLOOD PRESSURE: 143 MMHG | WEIGHT: 315 LBS | RESPIRATION RATE: 16 BRPM | DIASTOLIC BLOOD PRESSURE: 79 MMHG

## 2024-02-26 DIAGNOSIS — Z86.2 H/O IRON DEFICIENCY ANEMIA: ICD-10-CM

## 2024-02-26 DIAGNOSIS — E53.8 B12 DEFICIENCY: ICD-10-CM

## 2024-02-26 DIAGNOSIS — K90.9 IRON MALABSORPTION (HHS-HCC): ICD-10-CM

## 2024-02-26 PROCEDURE — 96365 THER/PROPH/DIAG IV INF INIT: CPT | Mod: INF

## 2024-02-26 PROCEDURE — 2500000004 HC RX 250 GENERAL PHARMACY W/ HCPCS (ALT 636 FOR OP/ED): Mod: JZ,SE

## 2024-02-26 RX ORDER — DIPHENHYDRAMINE HYDROCHLORIDE 50 MG/ML
50 INJECTION INTRAMUSCULAR; INTRAVENOUS AS NEEDED
Status: CANCELLED | OUTPATIENT
Start: 2024-03-04

## 2024-02-26 RX ORDER — EPINEPHRINE 0.3 MG/.3ML
0.3 INJECTION SUBCUTANEOUS EVERY 5 MIN PRN
Status: DISCONTINUED | OUTPATIENT
Start: 2024-02-26 | End: 2024-02-26 | Stop reason: HOSPADM

## 2024-02-26 RX ORDER — ALBUTEROL SULFATE 0.83 MG/ML
3 SOLUTION RESPIRATORY (INHALATION) AS NEEDED
Status: CANCELLED | OUTPATIENT
Start: 2024-03-04

## 2024-02-26 RX ORDER — HEPARIN SODIUM,PORCINE/PF 10 UNIT/ML
50 SYRINGE (ML) INTRAVENOUS AS NEEDED
Status: DISCONTINUED | OUTPATIENT
Start: 2024-02-26 | End: 2024-02-26 | Stop reason: HOSPADM

## 2024-02-26 RX ORDER — ALBUTEROL SULFATE 0.83 MG/ML
3 SOLUTION RESPIRATORY (INHALATION) AS NEEDED
Status: DISCONTINUED | OUTPATIENT
Start: 2024-02-26 | End: 2024-02-26 | Stop reason: HOSPADM

## 2024-02-26 RX ORDER — HEPARIN 100 UNIT/ML
500 SYRINGE INTRAVENOUS AS NEEDED
OUTPATIENT
Start: 2024-02-26

## 2024-02-26 RX ORDER — HEPARIN SODIUM,PORCINE/PF 10 UNIT/ML
50 SYRINGE (ML) INTRAVENOUS AS NEEDED
OUTPATIENT
Start: 2024-02-26

## 2024-02-26 RX ORDER — FAMOTIDINE 10 MG/ML
20 INJECTION INTRAVENOUS ONCE AS NEEDED
Status: CANCELLED | OUTPATIENT
Start: 2024-03-04

## 2024-02-26 RX ORDER — FAMOTIDINE 10 MG/ML
20 INJECTION INTRAVENOUS ONCE AS NEEDED
Status: DISCONTINUED | OUTPATIENT
Start: 2024-02-26 | End: 2024-02-26 | Stop reason: HOSPADM

## 2024-02-26 RX ORDER — DIPHENHYDRAMINE HYDROCHLORIDE 50 MG/ML
50 INJECTION INTRAMUSCULAR; INTRAVENOUS AS NEEDED
Status: DISCONTINUED | OUTPATIENT
Start: 2024-02-26 | End: 2024-02-26 | Stop reason: HOSPADM

## 2024-02-26 RX ORDER — EPINEPHRINE 0.3 MG/.3ML
0.3 INJECTION SUBCUTANEOUS EVERY 5 MIN PRN
Status: CANCELLED | OUTPATIENT
Start: 2024-03-04

## 2024-02-26 RX ADMIN — FERUMOXYTOL 510 MG: 510 INJECTION INTRAVENOUS at 12:46

## 2024-02-26 ASSESSMENT — PAIN SCALES - GENERAL: PAINLEVEL: 0-NO PAIN

## 2024-02-26 ASSESSMENT — PATIENT HEALTH QUESTIONNAIRE - PHQ9
1. LITTLE INTEREST OR PLEASURE IN DOING THINGS: NOT AT ALL
2. FEELING DOWN, DEPRESSED OR HOPELESS: NOT AT ALL
SUM OF ALL RESPONSES TO PHQ9 QUESTIONS 1 & 2: 0

## 2024-03-04 ENCOUNTER — INFUSION (OUTPATIENT)
Dept: HEMATOLOGY/ONCOLOGY | Facility: HOSPITAL | Age: 29
End: 2024-03-04
Payer: COMMERCIAL

## 2024-03-04 VITALS
DIASTOLIC BLOOD PRESSURE: 87 MMHG | TEMPERATURE: 95.5 F | RESPIRATION RATE: 16 BRPM | SYSTOLIC BLOOD PRESSURE: 156 MMHG | OXYGEN SATURATION: 96 % | HEART RATE: 91 BPM

## 2024-03-04 DIAGNOSIS — E53.8 B12 DEFICIENCY: ICD-10-CM

## 2024-03-04 PROCEDURE — 2500000004 HC RX 250 GENERAL PHARMACY W/ HCPCS (ALT 636 FOR OP/ED): Mod: SE

## 2024-03-04 PROCEDURE — 96372 THER/PROPH/DIAG INJ SC/IM: CPT

## 2024-03-04 RX ORDER — FAMOTIDINE 10 MG/ML
20 INJECTION INTRAVENOUS ONCE AS NEEDED
Status: DISCONTINUED | OUTPATIENT
Start: 2024-03-04 | End: 2024-03-04 | Stop reason: HOSPADM

## 2024-03-04 RX ORDER — ALBUTEROL SULFATE 0.83 MG/ML
3 SOLUTION RESPIRATORY (INHALATION) AS NEEDED
Status: DISCONTINUED | OUTPATIENT
Start: 2024-03-04 | End: 2024-03-04 | Stop reason: HOSPADM

## 2024-03-04 RX ORDER — EPINEPHRINE 0.3 MG/.3ML
0.3 INJECTION SUBCUTANEOUS EVERY 5 MIN PRN
Status: DISCONTINUED | OUTPATIENT
Start: 2024-03-04 | End: 2024-03-04 | Stop reason: HOSPADM

## 2024-03-04 RX ORDER — CYANOCOBALAMIN 1000 UG/ML
1000 INJECTION, SOLUTION INTRAMUSCULAR; SUBCUTANEOUS ONCE
Status: CANCELLED | OUTPATIENT
Start: 2024-04-01

## 2024-03-04 RX ORDER — CYANOCOBALAMIN 1000 UG/ML
1000 INJECTION, SOLUTION INTRAMUSCULAR; SUBCUTANEOUS ONCE
Status: COMPLETED | OUTPATIENT
Start: 2024-03-04 | End: 2024-03-04

## 2024-03-04 RX ORDER — EPINEPHRINE 0.3 MG/.3ML
0.3 INJECTION SUBCUTANEOUS EVERY 5 MIN PRN
Status: CANCELLED | OUTPATIENT
Start: 2024-04-01

## 2024-03-04 RX ORDER — FAMOTIDINE 10 MG/ML
20 INJECTION INTRAVENOUS ONCE AS NEEDED
Status: CANCELLED | OUTPATIENT
Start: 2024-04-01

## 2024-03-04 RX ORDER — ALBUTEROL SULFATE 0.83 MG/ML
3 SOLUTION RESPIRATORY (INHALATION) AS NEEDED
Status: CANCELLED | OUTPATIENT
Start: 2024-04-01

## 2024-03-04 RX ORDER — DIPHENHYDRAMINE HYDROCHLORIDE 50 MG/ML
50 INJECTION INTRAMUSCULAR; INTRAVENOUS AS NEEDED
Status: CANCELLED | OUTPATIENT
Start: 2024-04-01

## 2024-03-04 RX ORDER — DIPHENHYDRAMINE HYDROCHLORIDE 50 MG/ML
50 INJECTION INTRAMUSCULAR; INTRAVENOUS AS NEEDED
Status: DISCONTINUED | OUTPATIENT
Start: 2024-03-04 | End: 2024-03-04 | Stop reason: HOSPADM

## 2024-03-04 RX ADMIN — CYANOCOBALAMIN 1000 MCG: 1000 INJECTION, SOLUTION INTRAMUSCULAR at 12:08

## 2024-03-04 ASSESSMENT — PATIENT HEALTH QUESTIONNAIRE - PHQ9
2. FEELING DOWN, DEPRESSED OR HOPELESS: NOT AT ALL
SUM OF ALL RESPONSES TO PHQ9 QUESTIONS 1 & 2: 0
1. LITTLE INTEREST OR PLEASURE IN DOING THINGS: NOT AT ALL

## 2024-03-04 ASSESSMENT — PAIN SCALES - GENERAL: PAINLEVEL: 0-NO PAIN

## 2024-03-04 ASSESSMENT — ENCOUNTER SYMPTOMS
LOSS OF SENSATION IN FEET: 0
DEPRESSION: 0
OCCASIONAL FEELINGS OF UNSTEADINESS: 0

## 2024-03-04 ASSESSMENT — LIFESTYLE VARIABLES
AUDIT-C TOTAL SCORE: 0
HOW OFTEN DO YOU HAVE SIX OR MORE DRINKS ON ONE OCCASION: NEVER
HOW OFTEN DO YOU HAVE A DRINK CONTAINING ALCOHOL: NEVER
SKIP TO QUESTIONS 9-10: 1
HOW MANY STANDARD DRINKS CONTAINING ALCOHOL DO YOU HAVE ON A TYPICAL DAY: PATIENT DOES NOT DRINK

## 2024-03-04 ASSESSMENT — COLUMBIA-SUICIDE SEVERITY RATING SCALE - C-SSRS
2. HAVE YOU ACTUALLY HAD ANY THOUGHTS OF KILLING YOURSELF?: NO
1. IN THE PAST MONTH, HAVE YOU WISHED YOU WERE DEAD OR WISHED YOU COULD GO TO SLEEP AND NOT WAKE UP?: NO
6. HAVE YOU EVER DONE ANYTHING, STARTED TO DO ANYTHING, OR PREPARED TO DO ANYTHING TO END YOUR LIFE?: NO

## 2024-03-22 ENCOUNTER — SOCIAL WORK (OUTPATIENT)
Dept: CASE MANAGEMENT | Facility: HOSPITAL | Age: 29
End: 2024-03-22
Payer: COMMERCIAL

## 2024-03-22 ENCOUNTER — TELEMEDICINE (OUTPATIENT)
Dept: HEMATOLOGY/ONCOLOGY | Facility: HOSPITAL | Age: 29
End: 2024-03-22
Payer: COMMERCIAL

## 2024-03-22 DIAGNOSIS — D64.89 ANEMIA DUE TO OTHER CAUSE, NOT CLASSIFIED: ICD-10-CM

## 2024-03-22 DIAGNOSIS — D64.89 ANEMIA DUE TO OTHER CAUSE, NOT CLASSIFIED: Primary | ICD-10-CM

## 2024-03-22 PROCEDURE — 99214 OFFICE O/P EST MOD 30 MIN: CPT

## 2024-03-22 PROCEDURE — 1036F TOBACCO NON-USER: CPT

## 2024-03-22 PROCEDURE — 3008F BODY MASS INDEX DOCD: CPT

## 2024-03-22 RX ORDER — CHOLECALCIFEROL (VITAMIN D3) 25 MCG
2500 TABLET,CHEWABLE ORAL 3 TIMES WEEKLY
Qty: 30 TABLET | Refills: 3 | Status: SHIPPED | OUTPATIENT
Start: 2024-03-22 | End: 2024-03-25

## 2024-03-22 RX ORDER — FOLIC ACID 1 MG/1
1 TABLET ORAL 3 TIMES WEEKLY
Qty: 30 TABLET | Refills: 11 | Status: SHIPPED | OUTPATIENT
Start: 2024-03-22 | End: 2024-03-25

## 2024-03-22 NOTE — PROGRESS NOTES
"[facility]    PATIENT VISIT INFORMATION    Visit Type: Follow up     Referring Provider: [facility]  Reason for referral: Severe anemia     CANCER/HEMATOLGOY HISTORY    Patient is a 28 -year-old white man referred for severe anemia, splenomegaly, and abnormal CT with bowel thickening and hiatal hernia as well as  subcentimeter paraesophageal lymph nodes, details outlined below.  Patient was admitted 6/15/2021 for 1 day with generalized weakness and lightheadedness with a hemoglobin of 4s, noted to have severe iron deficiency anemia, was given 3 units packed red  blood cells, discharged on ferrous sulfate 3 times a day, recommended outpatient endoscopy.   Patient stated that he took iron 3 tablets, 3 times a day, 9 tablets/day for 1 month, ran out, then  took over-the-counter 1/day, no significant side effects even with this high dose of oral iron.  Patient was seen by PCP 6/23/2021 for follow-up anemia with B12 injection given, cough improved  after he was treated for bronchitis 6/2021 with methylprednisolone and azithromycin, chronic medical issues; patient said he only had the one B12 injection and then was requested to see me .  Patient was back in the ER 7/19/2021 with some intermittent upper abdominal discomfort, ran out of a \"little brown pill for my spleen\" for 1 week, intermittent dizziness and headaches over the past week; they refilled his pantoprazole and recommended  follow-up with hematology.    Given persistent iron deficiency despite PO iron, January 2022 Feraheme was given x2 doses, stopped oral iron in August 2022 given likely  ineffective, Feraheme again given 12/2022 x 2 doses, 3/2023 x2 doses (hemoglobin down to 9.9 without any visible bleeding), 4/2023 x2 doses, 7/2023 x 2 doses.  9/25/2023 patient refused Feraheme after 2 IV unsuccessful attempts, and I requested information to be given to the patient about a port-11/6/2023 patient initially said " he never got information about a port, just said that a port existed, but as I was walking out the door said that he did get information mailed to him, is still undecided if he wants a port.  11/6/2023 patient stated that there is only a 1 or 2 nurses that can get his IV, cannot give me the name of those nurses, says that he only takes iron if he feels bad even though I told him he likely does not absorb that.  9/1/2023 provider Priscila recommended an EGD and colonoscopy.  10/27/2023 Dr. Pfeiffer performed a colonoscopy showing retained stool with prior colonoscopy normal and same-day EGD with source of bleeding so likely not necessary to redo, EGD with 6 cm hiatal hernia with Sachin's lesions, normal-appearing duodenum with biopsy still pending; felt iron deficiency was secondary to hiatal hernia with Sachin's erosions, has an appointment with Dr. Hyatt general surgery 12/4/2023.     Hemoglobin was at the upper limit of normal various times in 2022, erythropoietin was slightly elevated so referred  to Dr. Valenzuela who saw him 9/19/2022 with no signs or symptoms of a pulmonary disorder or sleep apnea, follow-up as needed; erythropoietin was again elevated so CT chest/abdomen/pelvis was performed 12/26/2022 showing no evidence of malignancy but  hepatosplenomegaly with fatty infiltration, referred to Dr. Pfeiffer with hepatology who saw him 5/19/2023 with labs and FibroScan ordered along with weight loss program, likely NAFLD, follow-up in 6 months; he still has not done any of this testing yet except for the labs.  He  has received B12 injections monthly, most recently today 11/6/2023.  He does not necessarily feel any different on all of this vitamin supplementation,  even after IV iron.  10/14/21 Dr. Franco an EGD and colonoscopy with normal colonoscopy, EGD with LA grade B reflux esophagitis without bleeding, 3 cm hiatal hernia, multiple Sachin's ulcers, acute gastritis; pathology showed unremarkable duodenal  biopsy, stomach with reactive gastropathy but negative H. pylori; patient started on the PPI with no change in any of his symptoms.  7/18/2022 he met with gastroenterology provider Charito who recommended continuing PPI, no endoscopy because symptoms and labs  better, referred for a weight loss specialist; he saw provider Charito again 1/16/2023 noting no endoscopy due to no anemia.      Since 2015 patient has been on a gluten-free diet because of a history of multiple food allergies including gluten, never officially diagnosed with celiac disease.  His previous symptoms of headache, dizziness, presyncope have significantly improved  although he still has intermittent mild headaches but no more dizziness/presyncope.  8/15/2022 he was having intermittent mild abdominal pain-still happens rarely, stable.        HISTORY OF PRESENT ILLNESS     ID Statement: Melvin Foley is a 28 year old male     Chief Complaint: Anemia     Interval History:   Melvin presents today for follow up post [facility].  He reports fatigue at his baseline.    Currently, he reports he is losing insurance by the end of the month.  He does note weight gain despite trying to moderate what he eats.  He is not physically active incorporating exercise.  He follows up with general surgery and will have a range of testing and procedures completed.  He is on the track for bariatric surgery.  He reports that he would like to get his hiatal hernia repaired and notes that he must have bariatric surgery for this procedure to take place.  No fevers,  unintentional weight loss, sore throat, acute vision changes, chest pain, edema, shortness breath, cough, nausea or vomiting  now, abnormal bowel movements now,  blood in stool, dysuria or hematuria,  bone/back/muscle/joint pain, numbness, focal weakness, tingling, rashes or lumps, abnormal bruising or bleeding, diabetes  or thyroid disorder although elevated glucose on labs, anxiety or depression.   PAST/CURRENT  "HISTORY     MEDICAL/SURGICAL HISTORY  -Anemia   -bowel thickening  -iron and B1 2 deficiency   -Hepatosplenomegaly 12/26/2022.  Following GI, FibroScan ordered along with weight loss program, likely NAFLD.   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     OCCUPATIONAL/ENVIRONMENTAL HISTORY/EXPOSURES:  -none reported    Active Problems, Allergy List, Medication List, and PMH/PSH/FH/Social Hx have been reviewed and reconciled in chart. Updates made when necessary.     REVIEW OF SYSTEMS   A review of systems has been completed and are negative for complaints except what is stated in the assessment, HPI, IH, ROS, and/or past medical history.    ALLERGIES AND MEDICATIONS     Allergies and Intolerances:   Allergies   Allergen Reactions    Gluten Anaphylaxis    Cod Liver Oil Other    Corn Other    Egg Other    Milk Containing Products (Dairy) Other    Peanut Other    Joliet Other    Egg Extract Other    Food Extracts GI Upset     Was tested & has several food allergies      Medication Profile:   Current Outpatient Medications   Medication Instructions    cyanocobalamin (Vitamin B-12) " "1,000 mcg/mL injection Every 30 days    EPINEPHrine (EpiPen 2-Adam) 0.3 mg/0.3 mL injection syringe USE AS DIRECTED IN CASE OF A SEVERE ALLERGIC REACTION    folic acid (Folvite) 1 mg tablet 1 tablet, oral, Daily    lisinopril 10 mg, oral, Daily    miscellaneous medical supply (Blood Pressure Cuff) misc 1 kit, miscellaneous, Daily    omeprazole (PriLOSEC) 40 mg DR capsule 1 capsule, oral, Daily      Available Vaccination Record:   Immunization History   Administered Date(s) Administered    Hep A, Unspecified 08/03/2010, 08/04/2012    Meningococcal MCV4P 07/29/2009    Moderna SARS-CoV-2 Vaccination 05/02/2021, 05/30/2021, 05/23/2022    Tdap vaccine, age 7 year and older (BOOSTRIX, ADACEL) 07/29/2009    Varicella vaccine, subcutaneous (VARIVAX) 08/03/2010      PHYSICAL EXAM     Vital Signs/Measurements:       2/12/2024    12:44 PM 2/12/2024     1:09 PM 2/26/2024    11:03 AM 2/26/2024    12:15 PM 2/26/2024     1:00 PM 2/26/2024     1:29 PM 3/4/2024    11:58 AM   Vitals   Systolic 132 119  146 130 143 156   Diastolic 74 77  78 76 79 87   Heart Rate 89 98  91 88 92 91   Temp 36.5 °C (97.7 °F) 36.2 °C (97.2 °F)  35.8 °C (96.4 °F) 36.9 °C (98.4 °F) 36.9 °C (98.4 °F) 35.3 °C (95.5 °F)   Resp 16 17  16 16 16 16   Height (in)   1.88 m (6' 2\")       Weight (lb)   365 363.98      BMI   46.86 kg/m2 46.73 kg/m2      BSA (m2)   2.94 m2 2.94 m2           Performance:   ECOG Performance Status: 0     Grade ECOG performance status   0 Fully active, able to carry on all pre-disease performance without restriction   1 Restricted in physically strenuous activity but ambulatory and able to carry out work of a light or sedentary nature, e.g., light housework, office work   2 Ambulatory and capable of all selfcare but unable to carry out any work activities; Up and about more than 50% of waking hours   3 Capable of only limited selfcare, confined to bed or chair more than 50% of waking hours   4 Completely disabled; Cannot carry out any " selfcare; Totally confined to bed or chair   5 Dead     Physical Exam:  Virtual visit no physical exam today  RESULTS/DATA     Labs:   Lab Results   Component Value Date    WBC 6.2 02/05/2024    NEUTROABS 4.12 02/05/2024    IGABSOL 0.03 02/05/2024    LYMPHSABS 1.00 (L) 02/05/2024    MONOSABS 0.47 02/05/2024    EOSABS 0.49 02/05/2024    BASOSABS 0.04 02/05/2024    RBC 3.72 (L) 02/05/2024    MCV 70 (L) 02/05/2024    MCHC 27.9 (L) 02/05/2024    HGB 7.3 (L) 02/05/2024    HCT 26.2 (L) 02/05/2024     02/05/2024     Lab Results   Component Value Date    RETICCTPCT 2.8 (H) 02/05/2024      Lab Results   Component Value Date    CREATININE 0.85 02/05/2024    BUN 7 02/05/2024    EGFR >90 02/05/2024     02/05/2024    K 3.8 02/05/2024     (H) 02/05/2024    CO2 24 02/05/2024      Lab Results   Component Value Date    ALT 37 02/05/2024    AST 28 02/05/2024    ALKPHOS 54 02/05/2024    BILITOT 0.6 02/05/2024      Lab Results   Component Value Date    TSH 1.68 02/05/2024     Lab Results   Component Value Date    TSH 1.68 02/05/2024     Lab Results   Component Value Date    IRON 16 (L) 02/05/2024    TIBC  02/05/2024      Comment:      One or more of the analytes used in this calculation is outside of the analytical measurement range.      FERRITIN 8 (L) 02/05/2024      Lab Results   Component Value Date    ZWZKUPVY93 364 02/05/2024      Lab Results   Component Value Date    FOLATE 8.6 02/05/2024     Lab Results   Component Value Date    KELLY NEGATIVE 08/03/2021    RF <10 08/03/2021    SEDRATE 10 02/05/2024      Lab Results   Component Value Date    CRP 0.69 02/05/2024        Lab Results   Component Value Date     02/05/2024     Lab Results   Component Value Date    HAPTOGLOBIN 152 02/05/2024     Lab Results   Component Value Date    SPEP NORMAL 08/03/2021     Lab Results   Component Value Date    WBC 6.2 02/05/2024    NEUTROABS 4.12 02/05/2024    IGABSOL 0.03 02/05/2024    LYMPHSABS 1.00 (L) 02/05/2024     MONOSABS 0.47 02/05/2024    EOSABS 0.49 02/05/2024    BASOSABS 0.04 02/05/2024    RBC 3.72 (L) 02/05/2024    MCV 70 (L) 02/05/2024    MCHC 27.9 (L) 02/05/2024    HGB 7.3 (L) 02/05/2024    HCT 26.2 (L) 02/05/2024     02/05/2024     Lab Results   Component Value Date    RETICCTPCT 2.8 (H) 02/05/2024      Lab Results   Component Value Date    CREATININE 0.85 02/05/2024    BUN 7 02/05/2024    EGFR >90 02/05/2024     02/05/2024    K 3.8 02/05/2024     (H) 02/05/2024    CO2 24 02/05/2024      Lab Results   Component Value Date    ALT 37 02/05/2024    AST 28 02/05/2024    ALKPHOS 54 02/05/2024    BILITOT 0.6 02/05/2024      Lab Results   Component Value Date    TSH 1.68 02/05/2024     Lab Results   Component Value Date    TSH 1.68 02/05/2024     Lab Results   Component Value Date    IRON 16 (L) 02/05/2024    TIBC  02/05/2024      Comment:      One or more of the analytes used in this calculation is outside of the analytical measurement range.      FERRITIN 8 (L) 02/05/2024      Lab Results   Component Value Date    TJOPZAHP50 364 02/05/2024      Lab Results   Component Value Date    FOLATE 8.6 02/05/2024     Lab Results   Component Value Date    KELLY NEGATIVE 08/03/2021    RF <10 08/03/2021    SEDRATE 10 02/05/2024      Lab Results   Component Value Date     02/05/2024     Lab Results   Component Value Date    SPEP NORMAL 08/03/2021     Radiology/Studies:   CT chest abdomen pelvis w IV contrast  12/27/2022  IMPRESSION:  Large hiatal hernia similar to prior.  Hepatomegaly and diffuse fatty infiltration of the liver. Splenomegaly.   Colonoscopy/EGD   10/27/2023  Impression  No impression generated  Findings  Retained solid stool in the rectosigmoid colon  Recommendation    Prior colonoscopy was normal and upper endoscopy found a source of anemia so repeat colonoscopy might not be indicated until age 45 or other indication such as FOBT      Impression  The esophagus appeared normal.  6 cm hiatal  hernia with Sachin lesions present  The 1st part of the duodenum and 2nd part of the duodenum appeared normal. Performed random biopsy to rule out celiac disease.  Findings  The esophagus appeared normal.  6 cm hiatal hernia with Sachin lesions present - GE junction 34 cm from the incisors, diaphragmatic impression 40 cm from the incisors  The 1st part of the duodenum and 2nd part of the duodenum appeared normal. Performed random biopsy using biopsy forceps to rule out celiac disease.   Recommendation    Follow up with primary gastroenterologis     ASSESSMENT/PLAN     Assessment and Plan:   #1. Severe  iron deficiency anemia with microcytosis and thrombocytosis   In 6/2021 (6/2021 ferritin less than 8, hemoglobin 4s) with negative stool occult/urine analysis for blood and no visible bleeding, 6/2021 status post 3 units packed red blood cells and started on ferrous sulfate, 6/2021 reticulocyte count low but subsequently elevated possibly due to starting iron, normal CBC 7/2015, no prior history of anemia prior to 6/2021.  6/2021 patient presented with symptoms of cough, fatigue, near syncope.  No evidence of hemolysis with normal bilirubin.  B12 was only 259 , 6/2021 started on B12 injections from PCP; MCV was low likely  due to the iron deficiency, can see a mixed picture.  8/4/2021 hemoglobin normalized along with MCV, iron still low but hemoglobin normal so he was likely absorbing some and maintained on oral  iron, also diagnosed with folate deficiency and started on supplement.  8/4/2021 normal/negative B1/B6/copper/zinc, LDH, methylmalonic  acid, JAK2, rheumatoid factor, HIV, hepatitis, KELLY, celiac, lead, flow cytometry, SPEP, intrinsic factor antibody, parietal cell antibody. On 10/14/21 EGD and colonoscopy (done with a gluten challenge with negative duodenal biopsy, so he did not appear to have clinical celiac disease) with normal colonoscopy, EGD with LA grade B reflux esophagitis without bleeding, 3 cm  hiatal hernia,  multiple Sachin's ulcers, acute gastritis; pathology showed unremarkable duodenal biopsy, stomach with reactive gastropathy but negative H. pylori; therefore, he could have a combination of malabsorption (was started on a PPI 6/2021 but that was after he presented with anemia) as well as blood loss from the Sachin's ulcers.      December 2022 iron studies were persistently  low and he eventually agreed to Feraheme which was given 1/2022, and actually February 2022 iron studies normalized with hemoglobin up at the upper limit of normal.  Gastroenterology recommended no further endoscopy 7/18/2022 .     8/2022 he discontinued his oral iron given likely ineffectiveness.  Despite being out of his folate his level was decent 12/2022, subsequently restarted back on his folate.  Iron deficiency again receiving IV iron multiple times December 2022-April 2023 with hemoglobin down to 9.9 and MCV down to 76 in March 2023 consistent with severe iron deficiency, no visible bleeding.    CBC and iron studies improved after IV iron April 2023.  July 2023 hemoglobin down to 10.1 with clear iron deficiency status post IV iron again, still iron deficient.  September 2023 but he refused IV iron after multiple unsuccessful IV attempts.     On 10/27/2023 Dr. Pfeiffer performed a colonoscopy showing retained stool with prior colonoscopy normal and same-day EGD with source of bleeding so likely not necessary to redo, EGD with 6 cm hiatal hernia with Sachin's lesions, normal-appearing duodenum with biopsy pending; felt iron deficiency was secondary to hiatal hernia with Sachin's erosions, appointment with Dr. Hyatt general surgery 12/4/2023.  Erythropoietin level was elevated 7/2023 likely due to iron deficiency.  Normal B12 and folate most recently September 2023.  NEGATIVE HEPATITIS PANEL 8/2021.     Borderline polycythemia discovered once iron studies collected to 16.8 on 5/2/2022 with no known history of any  cardiopulmonary issues or sleep apnea but does have the body habitus predisposing apnea.  Negative myeloid next generation sequencing panel  8/2022 with erythropoietin slightly elevated at 32 likely secondary polycythemia, seen by pulmonary 9/2022 with no evidence of a pulmonary disorder.  Erythropoietin elevated up to 90 December 2022 with CT chest/abdomen/pelvis done to check for malignancy  which was unremarkable except for hepatosplenomegaly.    Normal testosterone 12/2023.  July 2023 anemic with elevated erythropoietin likely due to the iron deficiency.    June 2021, thrombocytosis likely secondary to iron deficiency with most platelet count normal on 6/23/2021.  Negative myeloid next generation sequencing panel 8/2022.  Platelet count normal on most recent labs.     On 2/5/2024 hemoglobin down to 7.3, MCV 70.  Corrected reticulocyte count is 1.7 showing hypoproliferation.  Showing the bone marrow inability to produce red blood cells adequately.  Hemoglobin identification has been drawn and pending along with additional labs to assess for hemolytic anemia.  LDH, haptoglobin, and bilirubin panels pending. Flow Cytometry in process.  Patient very pale.  Though denies any symptoms other than his normal baseline.  Patient severely iron deficient unable to detect a TIBC or saturation.  Iron 16, UIBC over 450.  Ferritin below 8.  Notably B12 is 364, monthly B12 injections have been ordered.  Folate low at 8.6, please take folic acid 1 mg daily.    3/22/2024 status post 2 Feraheme infusions. Feb 2024.  He will have labs drawn prior to losing his insurance.  He reports losing his insurance.  His insurance is through [employer].  I have reached out to social work to assist him referral placed.  Will send him B12 and folic acid.    #2. Mild Splenomegaly   Mild splenomegaly on imaging 15.2 cm by CT 6/2021, could be due to possible fatty liver although not mentioned on imaging but does have significant obesity, mild  upper abdominal pain seemed unlikely related to the mild splenomegaly especially as it seemed better in 2022.  Negative myeloid next generation sequencing panel 8/2022.   Gastroenterology recommended no further evaluation of this 7/18/2022.  Hepatosplenomegaly on imaging 12/2022.  Dr. Pfeiffer with hepatology saw him 5/19/2023 with labs and FibroScan ordered along with weight loss program, likely NAFLD-he has not done any  of this testing yet except for labs January 2023 with persistently elevated transaminases, normal ceruloplasmin and A1 AT, negative hepatitis B core antibody, positive hepatitis A antibody total.     #3. LFTS elevated  History of abnormal LFTs in [id number] but normal 6/2021.  Abnormal LFTs 7/2023 as above.  Calcium 8.4, LFTs normal on labs 2/5/2024.      I have reviewed the patient's medical record including provider notes, laboratory and testing results, imaging, and procedures available within the system and outside the system.     Follow up:    RTC:  -TBD patient unable to schedule d/t insurance ending in March 2024      Medications:  --B12 oral sent to pharmacy   --folic acid 1 mg daily    Imaging/Testing:  -No imaging ordered today    Referral:  -Social work     Other Pertinent Appointments:    Patient Discussion Summary:  Discussed the plan of care with the patient.  He is understanding.  Patient needs iron infusions soon as possible due to trending down hemoglobin.  If he were going further he will need a blood transfusion.  Please follow with GI and additional specialties as scheduled he states understanding and agreement.  Answered all questions to his liking.  Discussed healthy modifiable lifestyle changes such as healthy diet and types of food to eat, meeting with a dietitian, incorporating daily activity, even if it is active walking daily.  See importance of activity along with healthy diet to reduce weight gain.  He will call with any questions or concerns.    Thank you for allowing me  to participate in your care. It was a pleasure meeting you.    Sincerely,  Bev Robles, APRN-CNP       This document may have been written by voice recognition software.   Time based billing: Please see documentation within this chart

## 2024-03-22 NOTE — PROGRESS NOTES
CNP asked writer to reach out to pt as he reports he is losing his Medicaid insurance at the end of the month. Called pt to discuss. Pt reports he received a call from S asking for updated information. Once he provided that information, he was sent a letter saying he was over-income and he would be losing his Medicaid. From what pt told writer, it does appear that he is now over-income. Since losing ones Medicaid coverage is a qualified life event, pt is eligible to sign up for coverage through his employer or through healthcare[employer]. Pt is waiting to hear back from his HR department about signing up for insurance through them. Pt says he did look at a healthcare.gov plan and he could afford the premium, but he was concerned about high co-pays. Discussed with pt that if he chose the plan with high co-pays he could apply for financial assistance through  directly if needed. Pt asked that writer email pt all of this information as he was currently at work and unable to take notes. Writer emailed pt the requested information. Provided pt with writer's contact information and encouraged them to reach out should any additional questions or concerns arise.   Jacinta Salazar, MSW, LSW

## 2024-03-25 ENCOUNTER — DOCUMENTATION (OUTPATIENT)
Dept: SURGERY | Facility: CLINIC | Age: 29
End: 2024-03-25

## 2024-03-25 ENCOUNTER — NUTRITION (OUTPATIENT)
Dept: SURGERY | Facility: CLINIC | Age: 29
End: 2024-03-25
Payer: COMMERCIAL

## 2024-03-25 ENCOUNTER — LAB (OUTPATIENT)
Dept: LAB | Facility: LAB | Age: 29
End: 2024-03-25
Payer: COMMERCIAL

## 2024-03-25 VITALS — HEIGHT: 74 IN | WEIGHT: 315 LBS | BODY MASS INDEX: 40.43 KG/M2

## 2024-03-25 DIAGNOSIS — E53.8 B12 DEFICIENCY: ICD-10-CM

## 2024-03-25 DIAGNOSIS — D64.89 ANEMIA DUE TO OTHER CAUSE, NOT CLASSIFIED: ICD-10-CM

## 2024-03-25 DIAGNOSIS — Z86.2 H/O IRON DEFICIENCY ANEMIA: ICD-10-CM

## 2024-03-25 DIAGNOSIS — K90.9 IRON MALABSORPTION (HHS-HCC): ICD-10-CM

## 2024-03-25 DIAGNOSIS — E53.8 FOLATE DEFICIENCY: ICD-10-CM

## 2024-03-25 LAB
DAT-POLYSPECIFIC: NORMAL
ERYTHROCYTE [DISTWIDTH] IN BLOOD BY AUTOMATED COUNT: 19 % (ref 11.5–14.5)
FERRITIN SERPL-MCNC: 13 NG/ML (ref 20–300)
HCT VFR BLD AUTO: 31.1 % (ref 41–52)
HGB BLD-MCNC: 8.6 G/DL (ref 13.5–17.5)
IRON SATN MFR SERPL: 3 % (ref 25–45)
IRON SERPL-MCNC: 12 UG/DL (ref 35–150)
MCH RBC QN AUTO: 20.2 PG (ref 26–34)
MCHC RBC AUTO-ENTMCNC: 27.7 G/DL (ref 32–36)
MCV RBC AUTO: 73 FL (ref 80–100)
NRBC BLD-RTO: 0 /100 WBCS (ref 0–0)
PLATELET # BLD AUTO: 284 X10*3/UL (ref 150–450)
RBC # BLD AUTO: 4.25 X10*6/UL (ref 4.5–5.9)
TIBC SERPL-MCNC: 456 UG/DL (ref 240–445)
UIBC SERPL-MCNC: 444 UG/DL (ref 110–370)
WBC # BLD AUTO: 7.1 X10*3/UL (ref 4.4–11.3)

## 2024-03-25 PROCEDURE — 36415 COLL VENOUS BLD VENIPUNCTURE: CPT

## 2024-03-25 RX ORDER — CHOLECALCIFEROL (VITAMIN D3) 25 MCG
TABLET,CHEWABLE ORAL
Qty: 30 TABLET | Refills: 3 | Status: SHIPPED | OUTPATIENT
Start: 2024-03-25

## 2024-03-25 RX ORDER — FOLIC ACID 1 MG/1
TABLET ORAL
Qty: 30 TABLET | Refills: 11 | Status: SHIPPED | OUTPATIENT
Start: 2024-03-25

## 2024-03-25 NOTE — PROGRESS NOTES
"PREOPERATIVE, MULTIDISCIPLINARY, MEDICALLY SUPERVISED, REDUCED CALORIE DIET, BEHAVIOR MODIFICATION AND EXERCISE PROGRAM    S:  The pt struggles to eat 3 meals a day on his days off, but is ok on his days off. B: turkey sausage (9g of protein, 160kcal), L: wrap (11g of protein) w/turkey and chicken or salami (4 slices), D: pasta w/shrimp or chicken or burger w/potato and veggie, S: pickles or carrots and humus. Has been having 1 energy drink per week SF not carbonated. Continues to drink water and meets his goals. Has been drinking while eating at work since this is the only time he can drink. Will talk with his boss. Continues to take a MVI.     O:    Wt: 360lbs      Ht:1.88 m (6' 2\")       Body mass index is 46.22 kg/m².      Goal: 5% body weight loss over the course of program    Dietary recommendation:   1. Eliminate energy drink before we get together next.   2. Practice the 30-30-30 rule by drinking between meals.  3. Structure your meal plan - have 3 meals and 1 snack daily. Set reminders on the weekend to help you remember to eat 3 meals.   4. Have balanced meals that always contain a good source of protein.  5. Increase intake of non-starchy vegetables.  Have 5 servings fruits and vegetables daily.   6. Take a multivitamin daily.  7. Continue walking and going to the gym. Increase physical activity by 10-15 minutes to an end goal of 60 minutes 5 x per week.    Group Topic: Going Lean With Protein  Behavioral recommendation: Pt is encouraged to identify and recall sources of lean protein.    A/P: Pt appears to have a good understanding of how to incorporate lean protein into their daily meal pattern.  Pt has a goal to add a lean protein source to each meal. The pt will continue to work towards his goals. Will be losing current insurance, but will attend [facility] classes until he obtains new insurance. Will contact this RD when that happens to set a follow up appointment. Encouraged him to continue to stay on " track with his goals.     Exercise: walking 2x per week for 60+ minutes, gym (elliptical and crunches) 1x per week     Ivon Ramos RDN, LD

## 2024-03-26 ENCOUNTER — TELEPHONE (OUTPATIENT)
Dept: HEMATOLOGY/ONCOLOGY | Facility: HOSPITAL | Age: 29
End: 2024-03-26
Payer: COMMERCIAL

## 2024-03-26 NOTE — TELEPHONE ENCOUNTER
----- Message -----   From: STIVEN Ortiz   Sent: 3/25/2024   3:41 PM EDT   To: Zuri Schwartz RN     Can we see if Melvin can get more iron before his insurance stops end of month. He is still in need.   ----- Message -----   From: Lab, Background User   Sent: 3/25/2024   3:19 PM EDT   To: STIVEN Ortiz

## 2024-03-26 NOTE — TELEPHONE ENCOUNTER
Per attached note, STIVEN Cordero would like patient to come in prior his insurance lapsing to receive iron infusions. Reached out to patient to coordinate. Patient states his insurance coverage ends on either Sunday, 3/31/24 or Monday, 4/1/24. Due to his work schedule, he is not able to come in this week to receive infusions before his insurance coverage lapses. In-basket message sent to ordering provider to make her aware. Informed patient to reach out if he needs assistance with anything. Patient verbalized understanding and agreed to plan of care.

## 2024-04-01 ENCOUNTER — APPOINTMENT (OUTPATIENT)
Dept: HEMATOLOGY/ONCOLOGY | Facility: HOSPITAL | Age: 29
End: 2024-04-01
Payer: COMMERCIAL

## 2024-05-03 ENCOUNTER — TELEPHONE (OUTPATIENT)
Dept: HEMATOLOGY/ONCOLOGY | Facility: HOSPITAL | Age: 29
End: 2024-05-03
Payer: COMMERCIAL

## 2024-05-03 NOTE — TELEPHONE ENCOUNTER
Patient called and wanted to speak to BIB Robles, Took a message from the patient he wanted to let her know he needed to cancel his appointments this coming Monday due to his insurance still not being fixed. Let the patient know I would let her know and when everything gets straightened out to give us a call and we would get him scheduled.

## 2024-05-06 ENCOUNTER — APPOINTMENT (OUTPATIENT)
Dept: HEMATOLOGY/ONCOLOGY | Facility: HOSPITAL | Age: 29
End: 2024-05-06

## 2024-05-13 ENCOUNTER — INFUSION (OUTPATIENT)
Dept: HEMATOLOGY/ONCOLOGY | Facility: HOSPITAL | Age: 29
End: 2024-05-13
Payer: COMMERCIAL

## 2024-05-13 ENCOUNTER — OFFICE VISIT (OUTPATIENT)
Dept: HEMATOLOGY/ONCOLOGY | Facility: HOSPITAL | Age: 29
End: 2024-05-13
Payer: COMMERCIAL

## 2024-05-13 ENCOUNTER — LAB (OUTPATIENT)
Dept: LAB | Facility: LAB | Age: 29
End: 2024-05-13
Payer: COMMERCIAL

## 2024-05-13 VITALS
HEIGHT: 74 IN | BODY MASS INDEX: 40.43 KG/M2 | OXYGEN SATURATION: 97 % | SYSTOLIC BLOOD PRESSURE: 144 MMHG | RESPIRATION RATE: 16 BRPM | TEMPERATURE: 97.7 F | WEIGHT: 315 LBS | HEART RATE: 92 BPM | DIASTOLIC BLOOD PRESSURE: 83 MMHG

## 2024-05-13 DIAGNOSIS — E53.8 FOLATE DEFICIENCY: ICD-10-CM

## 2024-05-13 DIAGNOSIS — E53.8 B12 DEFICIENCY: ICD-10-CM

## 2024-05-13 DIAGNOSIS — K90.9 IRON MALABSORPTION (HHS-HCC): ICD-10-CM

## 2024-05-13 DIAGNOSIS — Z86.2 H/O IRON DEFICIENCY ANEMIA: Primary | ICD-10-CM

## 2024-05-13 LAB
ALBUMIN SERPL BCP-MCNC: 4.2 G/DL (ref 3.4–5)
ALP SERPL-CCNC: 53 U/L (ref 33–120)
ALT SERPL W P-5'-P-CCNC: 28 U/L (ref 10–52)
ANION GAP SERPL CALC-SCNC: 11 MMOL/L (ref 10–20)
AST SERPL W P-5'-P-CCNC: 22 U/L (ref 9–39)
BASOPHILS # BLD AUTO: 0.04 X10*3/UL (ref 0–0.1)
BASOPHILS NFR BLD AUTO: 0.7 %
BILIRUB SERPL-MCNC: 0.6 MG/DL (ref 0–1.2)
BUN SERPL-MCNC: 12 MG/DL (ref 6–23)
CALCIUM SERPL-MCNC: 9.3 MG/DL (ref 8.6–10.3)
CHLORIDE SERPL-SCNC: 108 MMOL/L (ref 98–107)
CO2 SERPL-SCNC: 23 MMOL/L (ref 21–32)
CREAT SERPL-MCNC: 0.79 MG/DL (ref 0.5–1.3)
EGFRCR SERPLBLD CKD-EPI 2021: >90 ML/MIN/1.73M*2
EOSINOPHIL # BLD AUTO: 0.42 X10*3/UL (ref 0–0.7)
EOSINOPHIL NFR BLD AUTO: 6.9 %
ERYTHROCYTE [DISTWIDTH] IN BLOOD BY AUTOMATED COUNT: 20.6 % (ref 11.5–14.5)
FERRITIN SERPL-MCNC: 23 NG/ML (ref 20–300)
GLUCOSE SERPL-MCNC: 96 MG/DL (ref 74–99)
HCT VFR BLD AUTO: 27.7 % (ref 41–52)
HGB BLD-MCNC: 7.4 G/DL (ref 13.5–17.5)
IMM GRANULOCYTES # BLD AUTO: 0.02 X10*3/UL (ref 0–0.7)
IMM GRANULOCYTES NFR BLD AUTO: 0.3 % (ref 0–0.9)
IRON SATN MFR SERPL: ABNORMAL %
IRON SERPL-MCNC: <10 UG/DL (ref 35–150)
LYMPHOCYTES # BLD AUTO: 0.92 X10*3/UL (ref 1.2–4.8)
LYMPHOCYTES NFR BLD AUTO: 15.1 %
MCH RBC QN AUTO: 19.9 PG (ref 26–34)
MCHC RBC AUTO-ENTMCNC: 26.7 G/DL (ref 32–36)
MCV RBC AUTO: 75 FL (ref 80–100)
MONOCYTES # BLD AUTO: 0.47 X10*3/UL (ref 0.1–1)
MONOCYTES NFR BLD AUTO: 7.7 %
NEUTROPHILS # BLD AUTO: 4.21 X10*3/UL (ref 1.2–7.7)
NEUTROPHILS NFR BLD AUTO: 69.3 %
NRBC BLD-RTO: 0 /100 WBCS (ref 0–0)
PLATELET # BLD AUTO: 242 X10*3/UL (ref 150–450)
POTASSIUM SERPL-SCNC: 4.1 MMOL/L (ref 3.5–5.3)
PROT SERPL-MCNC: 6.8 G/DL (ref 6.4–8.2)
RBC # BLD AUTO: 3.72 X10*6/UL (ref 4.5–5.9)
SODIUM SERPL-SCNC: 138 MMOL/L (ref 136–145)
TIBC SERPL-MCNC: ABNORMAL UG/DL
UIBC SERPL-MCNC: >450 UG/DL (ref 110–370)
VIT B12 SERPL-MCNC: 988 PG/ML (ref 211–911)
WBC # BLD AUTO: 6.1 X10*3/UL (ref 4.4–11.3)

## 2024-05-13 PROCEDURE — 83540 ASSAY OF IRON: CPT

## 2024-05-13 PROCEDURE — 85025 COMPLETE CBC W/AUTO DIFF WBC: CPT

## 2024-05-13 PROCEDURE — 83550 IRON BINDING TEST: CPT

## 2024-05-13 PROCEDURE — 99214 OFFICE O/P EST MOD 30 MIN: CPT

## 2024-05-13 PROCEDURE — 82728 ASSAY OF FERRITIN: CPT

## 2024-05-13 PROCEDURE — 36415 COLL VENOUS BLD VENIPUNCTURE: CPT

## 2024-05-13 PROCEDURE — 82607 VITAMIN B-12: CPT

## 2024-05-13 PROCEDURE — 80053 COMPREHEN METABOLIC PANEL: CPT

## 2024-05-13 PROCEDURE — 3008F BODY MASS INDEX DOCD: CPT

## 2024-05-13 RX ORDER — EPINEPHRINE 0.3 MG/.3ML
0.3 INJECTION SUBCUTANEOUS EVERY 5 MIN PRN
Status: CANCELLED | OUTPATIENT
Start: 2024-05-27

## 2024-05-13 RX ORDER — DIPHENHYDRAMINE HYDROCHLORIDE 50 MG/ML
50 INJECTION INTRAMUSCULAR; INTRAVENOUS AS NEEDED
Status: CANCELLED | OUTPATIENT
Start: 2024-05-27

## 2024-05-13 RX ORDER — ALBUTEROL SULFATE 0.83 MG/ML
3 SOLUTION RESPIRATORY (INHALATION) AS NEEDED
Status: CANCELLED | OUTPATIENT
Start: 2024-05-27

## 2024-05-13 RX ORDER — CYANOCOBALAMIN 1000 UG/ML
1000 INJECTION, SOLUTION INTRAMUSCULAR; SUBCUTANEOUS ONCE
Status: CANCELLED | OUTPATIENT
Start: 2024-05-27

## 2024-05-13 RX ORDER — FAMOTIDINE 10 MG/ML
20 INJECTION INTRAVENOUS ONCE AS NEEDED
Status: CANCELLED | OUTPATIENT
Start: 2024-05-27

## 2024-05-13 RX ORDER — CYANOCOBALAMIN 1000 UG/ML
1000 INJECTION, SOLUTION INTRAMUSCULAR; SUBCUTANEOUS ONCE
Status: DISCONTINUED | OUTPATIENT
Start: 2024-05-13 | End: 2024-05-13 | Stop reason: HOSPADM

## 2024-05-13 ASSESSMENT — ENCOUNTER SYMPTOMS
OCCASIONAL FEELINGS OF UNSTEADINESS: 0
LOSS OF SENSATION IN FEET: 0
DEPRESSION: 0

## 2024-05-13 ASSESSMENT — PAIN SCALES - GENERAL: PAINLEVEL: 0-NO PAIN

## 2024-05-13 NOTE — PROGRESS NOTES
"[facility]    PATIENT VISIT INFORMATION    Visit Type: Follow up     Referring Provider: [facility]  Reason for referral: Severe anemia     CANCER/HEMATOLGOY HISTORY    Patient is a 28 -year-old white man referred for severe anemia, splenomegaly, and abnormal CT with bowel thickening and hiatal hernia as well as  subcentimeter paraesophageal lymph nodes, details outlined below.  Patient was admitted 6/15/2021 for 1 day with generalized weakness and lightheadedness with a hemoglobin of 4s, noted to have severe iron deficiency anemia, was given 3 units packed red  blood cells, discharged on ferrous sulfate 3 times a day, recommended outpatient endoscopy.   Patient stated that he took iron 3 tablets, 3 times a day, 9 tablets/day for 1 month, ran out, then  took over-the-counter 1/day, no significant side effects even with this high dose of oral iron.  Patient was seen by PCP 6/23/2021 for follow-up anemia with B12 injection given, cough improved  after he was treated for bronchitis 6/2021 with methylprednisolone and azithromycin, chronic medical issues; patient said he only had the one B12 injection and then was requested to see me .  Patient was back in the ER 7/19/2021 with some intermittent upper abdominal discomfort, ran out of a \"little brown pill for my spleen\" for 1 week, intermittent dizziness and headaches over the past week; they refilled his pantoprazole and recommended  follow-up with hematology.     Given persistent iron deficiency despite PO iron, January 2022 Feraheme was given x2 doses, stopped oral iron in August 2022 given likely  ineffective, Feraheme again given 12/2022 x 2 doses, 3/2023 x2 doses (hemoglobin down to 9.9 without any visible bleeding), 4/2023 x2 doses, 7/2023 x 2 doses.  9/25/2023 patient refused Feraheme after 2 IV unsuccessful attempts, and I requested information to be given to the patient about a port-11/6/2023 patient initially said " he never got information about a port, just said that a port existed, but as I was walking out the door said that he did get information mailed to him, is still undecided if he wants a port.  11/6/2023 patient stated that there is only a 1 or 2 nurses that can get his IV, cannot give me the name of those nurses, says that he only takes iron if he feels bad even though I told him he likely does not absorb that.  9/1/2023 provider Priscila recommended an EGD and colonoscopy.  10/27/2023 Dr. Pfeiffer performed a colonoscopy showing retained stool with prior colonoscopy normal and same-day EGD with source of bleeding so likely not necessary to redo, EGD with 6 cm hiatal hernia with Sachin's lesions, normal-appearing duodenum with biopsy still pending; felt iron deficiency was secondary to hiatal hernia with Sachin's erosions, has an appointment with Dr. Hyatt general surgery 12/4/2023.     Hemoglobin was at the upper limit of normal various times in 2022, erythropoietin was slightly elevated so referred  to Dr. Valenzuela who saw him 9/19/2022 with no signs or symptoms of a pulmonary disorder or sleep apnea, follow-up as needed; erythropoietin was again elevated so CT chest/abdomen/pelvis was performed 12/26/2022 showing no evidence of malignancy but  hepatosplenomegaly with fatty infiltration, referred to Dr. Pfeiffer with hepatology who saw him 5/19/2023 with labs and FibroScan ordered along with weight loss program, likely NAFLD, follow-up in 6 months; he still has not done any of this testing yet except for the labs.  He  has received B12 injections monthly, most recently today 11/6/2023.  He does not necessarily feel any different on all of this vitamin supplementation,  even after IV iron.  10/14/21 Dr. Franco an EGD and colonoscopy with normal colonoscopy, EGD with LA grade B reflux esophagitis without bleeding, 3 cm hiatal hernia, multiple Sachin's ulcers, acute gastritis; pathology showed unremarkable duodenal  biopsy, stomach with reactive gastropathy but negative H. pylori; patient started on the PPI with no change in any of his symptoms.  7/18/2022 he met with gastroenterology provider Charito who recommended continuing PPI, no endoscopy because symptoms and labs  better, referred for a weight loss specialist; he saw HOLGER Mcneill CNP again 1/16/2023 noting no endoscopy due to anemia.      Since 2015 patient has been on a gluten-free diet because of a history of multiple food allergies including gluten, never officially diagnosed with celiac disease.  His previous symptoms of headache, dizziness, presyncope have significantly improved  although he still has intermittent mild headaches but no more dizziness/presyncope.  8/15/2022 he was having intermittent mild abdominal pain-still happens rarely, stable.        HISTORY OF PRESENT ILLNESS     ID Statement: Melvin Foley is a 28 year old male     Chief Complaint: Anemia     Interval History:   Melvin presents today for follow up. Last Feraheme April 1, 2024.  He reports fatigue at his baseline, though never feels different.  New insurance with high co pays. Improved healthy diet and exercise. He does not have a primary. Referred to Francisco Chavez CNP.  He follows up with general surgery and will have a range of testing and procedures completed.  He is on the track for bariatric surgery.  He reports that he would like to get his hiatal hernia repaired and notes that he must have bariatric surgery for this procedure to take place.  He is concerned with his new insurance that this may not take place.    No fevers, unintentional weight loss, sore throat, acute vision changes, chest pain, edema, shortness breath, cough, nausea or vomiting  now, abnormal bowel movements now,  blood in stool, dysuria or hematuria, bone/back/muscle/joint pain, numbness, focal weakness, tingling, rashes or lumps, abnormal bruising or bleeding, diabetes  or thyroid disorder although elevated glucose on  "labs, anxiety or depression. Denies bruising or bleeding from any location.     PAST/CURRENT HISTORY     MEDICAL/SURGICAL HISTORY  -Anemia   -bowel thickening  -iron and B1 2 deficiency   -Hepatosplenomegaly 12/26/2022.  Following GI, FibroScan ordered along with weight loss program, likely NAFLD.   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     OCCUPATIONAL/ENVIRONMENTAL HISTORY/EXPOSURES:  -none reported    Active Problems, Allergy List, Medication List, and PMH/PSH/FH/Social Hx have been reviewed and reconciled in chart. Updates made when necessary.     REVIEW OF SYSTEMS   A review of systems has been completed and are negative for complaints except what is stated in the assessment, HPI, IH, ROS, and/or past medical history.    ALLERGIES AND MEDICATIONS     Allergies and Intolerances:   Allergies   Allergen Reactions    Gluten Anaphylaxis    Cod Liver Oil Other    Corn Other    Egg Other    Milk Containing Products (Dairy) Other    Peanut Other    Hallock Other    Egg Extract Other    Food Extracts GI Upset     Was tested & has several food allergies      Medication Profile: " "  Current Outpatient Medications   Medication Instructions    cyanocobalamin (Vitamin B-12) 2,500 mcg tablet TAKE 1 TABLET BY MOUTH THREE TIMES A WEEK    EPINEPHrine (EpiPen 2-Adam) 0.3 mg/0.3 mL injection syringe USE AS DIRECTED IN CASE OF A SEVERE ALLERGIC REACTION    folic acid (Folvite) 1 mg tablet TAKE 1 TABLET BY MOUTH THREE TIMES A WEEK    lisinopril 10 mg, oral, Daily    miscellaneous medical supply (Blood Pressure Cuff) misc 1 kit, miscellaneous, Daily    omeprazole (PriLOSEC) 40 mg DR capsule 1 capsule, oral, Daily      Available Vaccination Record:   Immunization History   Administered Date(s) Administered    Hep A, Unspecified 08/03/2010, 08/04/2012    Meningococcal MCV4P 07/29/2009    Moderna SARS-CoV-2 Vaccination 05/02/2021, 05/30/2021, 05/23/2022    Tdap vaccine, age 7 year and older (BOOSTRIX, ADACEL) 07/29/2009    Varicella vaccine, subcutaneous (VARIVAX) 08/03/2010      PHYSICAL EXAM     Vital Signs/Measurements:       2/26/2024    11:03 AM 2/26/2024    12:15 PM 2/26/2024     1:00 PM 2/26/2024     1:29 PM 3/4/2024    11:58 AM 3/25/2024     3:04 PM 5/13/2024     8:03 AM   Vitals   Systolic  146 130 143 156  144   Diastolic  78 76 79 87  83   Heart Rate  91 88 92 91  92   Temp  35.8 °C (96.4 °F) 36.9 °C (98.4 °F) 36.9 °C (98.4 °F) 35.3 °C (95.5 °F)  36.5 °C (97.7 °F)   Resp  16 16 16 16  16   Height (in) 1.88 m (6' 2\")     1.88 m (6' 2\") 1.88 m (6' 2\")   Weight (lb) 365 363.98    360 359.79   BMI 46.86 kg/m2 46.73 kg/m2    46.22 kg/m2 46.19 kg/m2   BSA (m2) 2.94 m2 2.94 m2    2.92 m2 2.92 m2   Visit Report       Report      Performance:   ECOG Performance Status: 0     Grade ECOG performance status   0 Fully active, able to carry on all pre-disease performance without restriction   1 Restricted in physically strenuous activity but ambulatory and able to carry out work of a light or sedentary nature, e.g., light housework, office work   2 Ambulatory and capable of all selfcare but unable to carry out " any work activities; Up and about more than 50% of waking hours   3 Capable of only limited selfcare, confined to bed or chair more than 50% of waking hours   4 Completely disabled; Cannot carry out any selfcare; Totally confined to bed or chair   5 Dead     Physical Exam:    General: Patient is pale, awake/alert/oriented, no distress, Nourished, hydrated, alert and cooperative, ambulating without difficulty  Skin: Good turgor, dry, no prominent lesions, rashes, unusual bruising, or bleeding observed  Hair: Normal texture and distribution   Nails: Normal color, no deformities    HEENT:   Head: Normocephalic, atraumatic, no visible masses, depressions, or scarring   Eyes: Conjunctiva clear, sclera non-icteric, no exudates or hemorrhages   Ears: nl appearance, hearing intact    Nose: no external lesions, mucosa non-inflamed, no rhinorrhea   Mouth: Mucous membranes moist, no lesions, sores, bleeding, or erythema     Head/Neck: Neck supple, no apparent injury, thyroid without mass or tenderness, No JVD, trachea midline, no bruits appreciated    Respiratory/Thorax: Patent airways, CTAB, chest symmetry, normal inspiratory and expiratory effort    Cardiovascular: Regular rate and rhythm, no murmur or gallop, no carotid bruit or thrills    Gastrointestinal: Bowel sounds normal, soft-distended, no tenderness, no masses or hernia, or organomegaly appreciated due obesity   Genitourinary: deferred   Musculoskeletal: Normal gait, normal range of motion, no pain on palpation of spine, no deformity, normal strength for baseline, no atrophy, and no CVA tenderness appreciated   Extremities: No amputations or deformities, cyanosis, edema or viscosities, peripheral pulses intact   Neurological: Sensation present to touch, intact senses, motor response and reflexes normal, normal strength   Breast:   Lymphatic: No significant lymphadenopathy   Psychological: Intact recent and remote memory, judgement, and insight. Appropriate mood,  affect, and behavior          RESULTS/DATA     Labs:     Lab Results   Component Value Date    WBC 6.1 05/13/2024    NEUTROABS 4.21 05/13/2024    IGABSOL 0.02 05/13/2024    LYMPHSABS 0.92 (L) 05/13/2024    MONOSABS 0.47 05/13/2024    EOSABS 0.42 05/13/2024    BASOSABS 0.04 05/13/2024    RBC 3.72 (L) 05/13/2024    MCV 75 (L) 05/13/2024    MCHC 26.7 (L) 05/13/2024    HGB 7.4 (L) 05/13/2024    HCT 27.7 (L) 05/13/2024     05/13/2024     Lab Results   Component Value Date    RETICCTPCT 2.8 (H) 02/05/2024      Lab Results   Component Value Date    CREATININE 0.79 05/13/2024    BUN 12 05/13/2024    EGFR >90 05/13/2024     05/13/2024    K 4.1 05/13/2024     (H) 05/13/2024    CO2 23 05/13/2024      Lab Results   Component Value Date    ALT 28 05/13/2024    AST 22 05/13/2024    ALKPHOS 53 05/13/2024    BILITOT 0.6 05/13/2024      Lab Results   Component Value Date    TSH 1.68 02/05/2024     Lab Results   Component Value Date    TSH 1.68 02/05/2024     Lab Results   Component Value Date    IRON <10 (L) 05/13/2024    TIBC  05/13/2024      Comment:      One or more of the analytes used in this calculation is outside of the analytical measurement range.      FERRITIN 23 05/13/2024      Lab Results   Component Value Date    VEIZOWRI49 364 02/05/2024      Lab Results   Component Value Date    FOLATE 8.6 02/05/2024     Lab Results   Component Value Date    KELLY NEGATIVE 08/03/2021    RF <10 08/03/2021    SEDRATE 10 02/05/2024      Lab Results   Component Value Date    CRP 0.69 02/05/2024      Lab Results   Component Value Date     02/05/2024     Lab Results   Component Value Date    HAPTOGLOBIN 152 02/05/2024     Lab Results   Component Value Date    SPEP NORMAL 08/03/2021        Radiology/Studies:   CT chest abdomen pelvis w IV contrast  12/27/2022  IMPRESSION:  Large hiatal hernia similar to prior.  Hepatomegaly and diffuse fatty infiltration of the liver. Splenomegaly.   Colonoscopy/EGD    10/27/2023  Impression  No impression generated  Findings  Retained solid stool in the rectosigmoid colon  Recommendation    Prior colonoscopy was normal and upper endoscopy found a source of anemia so repeat colonoscopy might not be indicated until age 45 or other indication such as FOBT      Impression  The esophagus appeared normal.  6 cm hiatal hernia with Sachin lesions present  The 1st part of the duodenum and 2nd part of the duodenum appeared normal. Performed random biopsy to rule out celiac disease.  Findings  The esophagus appeared normal.  6 cm hiatal hernia with Sachin lesions present - GE junction 34 cm from the incisors, diaphragmatic impression 40 cm from the incisors  The 1st part of the duodenum and 2nd part of the duodenum appeared normal. Performed random biopsy using biopsy forceps to rule out celiac disease.   Recommendation    Follow up with primary gastroenterologis     ASSESSMENT/PLAN     Assessment and Plan:   #1. Severe  iron deficiency anemia with microcytosis and thrombocytosis   In 6/2021 (6/2021 ferritin less than 8, hemoglobin 4s) with negative stool occult/urine analysis for blood and no visible bleeding, 6/2021 status post 3 units packed red blood cells and started on ferrous sulfate, 6/2021 reticulocyte count low but subsequently elevated possibly due to starting iron, normal CBC 7/2015, no prior history of anemia prior to 6/2021.  6/2021 patient presented with symptoms of cough, fatigue, near syncope.  No evidence of hemolysis with normal bilirubin.  B12 was only 259 , 6/2021 started on B12 injections from PCP; MCV was low likely  due to the iron deficiency, can see a mixed picture.  8/4/2021 hemoglobin normalized along with MCV, iron still low but hemoglobin normal so he was likely absorbing some and maintained on oral  iron, also diagnosed with folate deficiency and started on supplement.  8/4/2021 normal/negative B1/B6/copper/zinc, LDH, methylmalonic  acid, JAK2, rheumatoid  factor, HIV, hepatitis, KELLY, celiac, lead, flow cytometry, SPEP, intrinsic factor antibody, parietal cell antibody. On 10/14/21 EGD and colonoscopy (done with a gluten challenge with negative duodenal biopsy, so he did not appear to have clinical celiac disease) with normal colonoscopy, EGD with LA grade B reflux esophagitis without bleeding, 3 cm hiatal hernia,  multiple Sachin's ulcers, acute gastritis; pathology showed unremarkable duodenal biopsy, stomach with reactive gastropathy but negative H. pylori; therefore, he could have a combination of malabsorption (was started on a PPI 6/2021 but that was after he presented with anemia) as well as blood loss from the Sachin's ulcers.      December 2022 iron studies were persistently  low and he eventually agreed to Feraheme which was given 1/2022, and actually February 2022 iron studies normalized with hemoglobin up at the upper limit of normal.  Gastroenterology recommended no further endoscopy 7/18/2022.     8/2022 he discontinued his oral iron given likely ineffectiveness.  Despite being out of his folate his level was decent 12/2022, subsequently restarted back on his folate.  Iron deficiency again receiving IV iron multiple times December 2022-April 2023 with hemoglobin down to 9.9 and MCV down to 76 in March 2023 consistent with severe iron deficiency, no visible bleeding.    CBC and iron studies improved after IV iron April 2023.  July 2023 hemoglobin down to 10.1 with clear iron deficiency status post IV iron again, still iron deficient.    September 2023 but he refused IV iron after multiple unsuccessful IV attempts.     On 10/27/2023 Dr. Pfeiffer performed a colonoscopy showing retained stool with prior colonoscopy normal and same-day EGD with source of bleeding so likely not necessary to redo, EGD with 6 cm hiatal hernia with Sachin's lesions, normal-appearing duodenum with biopsy pending; felt iron deficiency was secondary to hiatal hernia with Sachin's  yuliana, appointment with Dr. Hyatt general surgery 12/4/2023.  Erythropoietin level was elevated 7/2023 likely due to iron deficiency.  Normal B12 and folate most recently September 2023.  NEGATIVE HEPATITIS PANEL 8/2021.     Borderline polycythemia discovered once iron studies collected to 16.8 on 5/2/2022 with no known history of any cardiopulmonary issues or sleep apnea but does have the body habitus predisposing apnea.  Negative myeloid next generation sequencing panel  8/2022 with erythropoietin slightly elevated at 32 likely secondary polycythemia, seen by pulmonary 9/2022 with no evidence of a pulmonary disorder.  Erythropoietin elevated up to 90 December 2022 with CT chest/abdomen/pelvis done to check for malignancy  which was unremarkable except for hepatosplenomegaly.  Normal testosterone 12/2023.  July 2023 anemic with elevated erythropoietin likely due to the iron deficiency.    June 2021, thrombocytosis likely secondary to iron deficiency with most platelet count normal on 6/23/2021.  Negative myeloid next generation sequencing panel 8/2022.  Platelet count normal on most recent labs.     On 2/5/2024 hemoglobin down to 7.3, MCV 70.  Corrected reticulocyte count is 1.7 showing hypoproliferation.  Showing the bone marrow inability to produce red blood cells adequately.  Hemoglobin identification has been drawn and pending along with additional labs to assess for hemolytic anemia.  LDH, haptoglobin, and bilirubin panels pending. Flow Cytometry in process.  Patient very pale.  Though denies any symptoms other than his normal baseline.  Patient severely iron deficient unable to detect a TIBC or saturation.  Iron 16, UIBC over 450.  Ferritin below 8.  Notably B12 is 364, monthly B12 injections have been ordered.  Folate low at 8.6, please take folic acid 1 mg daily.    3/22/2024 status post 2 Feraheme infusions. Feb 2024.  He will have labs drawn prior to losing his insurance.  He reports losing his  insurance.  His insurance is through [employer].  I have reached out to social work to assist him referral placed.  Will send him B12 and folic acid.     5/13/2024-status post 2 Feraheme last April 1, 2024.  I have ordered additional IV iron infusions.  Depending on insurance coverage we will modify as needed.  He is going to follow-up with Francisco Chavez CNP for primary care needs.  Colonoscopy October 2023 and EGD 12/20/2023 did not note any bleeding.  He is unsure if he will be able to have his surgery due to lack of insurance benefits.  CT chest abdomen pelvis in December 2022 showed a large hiatal hernia similar to prior exam.  Additionally, showed enlarged liver and spleen. Concern for bleeding. Hiatal hernia is now 6 cm with indira lesions. Please see GI.     #2. Hepatomegaly and Mild Splenomegaly   Mild splenomegaly on imaging 15.2 cm by CT 6/2021, could be due to possible fatty liver although not mentioned on imaging but does have significant obesity, mild upper abdominal pain seemed unlikely related to the mild splenomegaly especially as it seemed better in 2022.  Negative myeloid next generation sequencing panel 8/2022.   Gastroenterology recommended no further evaluation of this 7/18/2022.    Hepatosplenomegaly on imaging 12/2022.  Dr. Pfeiffer with hepatology saw him 5/19/2023 with labs and FibroScan ordered along with weight loss program, likely NAFLD-he has not done any of this testing yet except for labs January 2023 with persistently elevated transaminases, normal ceruloplasmin and A1 AT, negative hepatitis B core antibody, positive hepatitis A antibody total. Hep A non-reactive 8/2021.   5/13/2024: He has met with bariatric clinic. Unsure of the plan at this time due to losing insurance and the cost of his new plan. An US of abdomen may be warranted if LFTs return. Splenomegaly may be secondary to fatty liver.     #3. LFTS elevated  History of abnormal LFTs in [id number] but normal 6/2021.   Abnormal LFTs 7/2023 as above.  Calcium 8.4, LFTs normal on labs 2/5/2024.  5/13/2024 no elevated LFTs noted on most recent labs. Discussed healthy food group consumption to help reduce fatty liver and daily physical activity.       I have reviewed the patient's medical record including provider notes, laboratory and testing results, imaging, and procedures available within the system and outside the system.     Follow up:    RTC:  -6 months scheduled d/t insurance copay expense  -Labs bi monthly       Medications:  --B12 oral sent to pharmacy   --folic acid 1 mg daily    Imaging/Testing:  -No imaging ordered today    Referral:  -Gastro    Other Pertinent Appointments:  -NA    Patient Discussion Summary:  Discussed the plan of care with the patient.  He is understanding.  Patient needs iron infusions soon as possible due to trending down hemoglobin and malabsorption.  Please follow with GI and additional specialties as scheduled. He states understanding and agreement.  Answered all questions to his liking.  Discussed healthy modifiable lifestyle changes such as healthy diet and types of food to eat, meeting with a dietitian, incorporating daily activity, even if it is active walking daily.  He will call with any questions or concerns.     Thank you for allowing me to participate in your care.     Sincerely,  Bev Robles, APRN-CNP       This document may have been written by voice recognition software.   Time based billing: Please see documentation within this chart

## 2024-05-14 DIAGNOSIS — D64.89 ANEMIA DUE TO OTHER CAUSE, NOT CLASSIFIED: Primary | ICD-10-CM

## 2024-05-20 ENCOUNTER — INFUSION (OUTPATIENT)
Dept: HEMATOLOGY/ONCOLOGY | Facility: HOSPITAL | Age: 29
End: 2024-05-20
Payer: COMMERCIAL

## 2024-05-20 VITALS
RESPIRATION RATE: 16 BRPM | HEART RATE: 82 BPM | WEIGHT: 315 LBS | SYSTOLIC BLOOD PRESSURE: 119 MMHG | BODY MASS INDEX: 45.94 KG/M2 | TEMPERATURE: 98.4 F | OXYGEN SATURATION: 96 % | DIASTOLIC BLOOD PRESSURE: 77 MMHG

## 2024-05-20 DIAGNOSIS — K90.9 IRON MALABSORPTION (HHS-HCC): ICD-10-CM

## 2024-05-20 DIAGNOSIS — Z86.2 H/O IRON DEFICIENCY ANEMIA: ICD-10-CM

## 2024-05-20 DIAGNOSIS — D64.89 ANEMIA DUE TO OTHER CAUSE, NOT CLASSIFIED: ICD-10-CM

## 2024-05-20 LAB
APTT PPP: 31 SECONDS (ref 27–38)
INR PPP: 1.1 (ref 0.9–1.1)
PROTHROMBIN TIME: 12.4 SECONDS (ref 9.8–12.8)

## 2024-05-20 PROCEDURE — 2500000004 HC RX 250 GENERAL PHARMACY W/ HCPCS (ALT 636 FOR OP/ED)

## 2024-05-20 PROCEDURE — 83520 IMMUNOASSAY QUANT NOS NONAB: CPT

## 2024-05-20 PROCEDURE — 85397 CLOTTING FUNCT ACTIVITY: CPT

## 2024-05-20 PROCEDURE — 82397 CHEMILUMINESCENT ASSAY: CPT

## 2024-05-20 PROCEDURE — 85247 CLOT FACTOR VIII MULTIMETRIC: CPT

## 2024-05-20 PROCEDURE — 85246 CLOT FACTOR VIII VW ANTIGEN: CPT | Mod: GENLAB

## 2024-05-20 PROCEDURE — 85245 CLOT FACTOR VIII VW RISTOCTN: CPT

## 2024-05-20 PROCEDURE — 85610 PROTHROMBIN TIME: CPT

## 2024-05-20 PROCEDURE — 96365 THER/PROPH/DIAG IV INF INIT: CPT | Mod: INF

## 2024-05-20 RX ORDER — ALBUTEROL SULFATE 0.83 MG/ML
3 SOLUTION RESPIRATORY (INHALATION) AS NEEDED
Status: DISCONTINUED | OUTPATIENT
Start: 2024-05-20 | End: 2024-05-20 | Stop reason: HOSPADM

## 2024-05-20 RX ORDER — DIPHENHYDRAMINE HYDROCHLORIDE 50 MG/ML
50 INJECTION INTRAMUSCULAR; INTRAVENOUS AS NEEDED
Status: DISCONTINUED | OUTPATIENT
Start: 2024-05-20 | End: 2024-05-20 | Stop reason: HOSPADM

## 2024-05-20 RX ORDER — ALBUTEROL SULFATE 0.83 MG/ML
3 SOLUTION RESPIRATORY (INHALATION) AS NEEDED
Status: CANCELLED | OUTPATIENT
Start: 2024-05-27

## 2024-05-20 RX ORDER — FAMOTIDINE 10 MG/ML
20 INJECTION INTRAVENOUS ONCE AS NEEDED
Status: CANCELLED | OUTPATIENT
Start: 2024-05-27

## 2024-05-20 RX ORDER — DIPHENHYDRAMINE HYDROCHLORIDE 50 MG/ML
50 INJECTION INTRAMUSCULAR; INTRAVENOUS AS NEEDED
Status: CANCELLED | OUTPATIENT
Start: 2024-05-27

## 2024-05-20 RX ORDER — EPINEPHRINE 0.3 MG/.3ML
0.3 INJECTION SUBCUTANEOUS EVERY 5 MIN PRN
Status: CANCELLED | OUTPATIENT
Start: 2024-05-27

## 2024-05-20 RX ORDER — EPINEPHRINE 0.3 MG/.3ML
0.3 INJECTION SUBCUTANEOUS EVERY 5 MIN PRN
Status: DISCONTINUED | OUTPATIENT
Start: 2024-05-20 | End: 2024-05-20 | Stop reason: HOSPADM

## 2024-05-20 RX ORDER — FAMOTIDINE 10 MG/ML
20 INJECTION INTRAVENOUS ONCE AS NEEDED
Status: DISCONTINUED | OUTPATIENT
Start: 2024-05-20 | End: 2024-05-20 | Stop reason: HOSPADM

## 2024-05-20 RX ADMIN — FERUMOXYTOL 510 MG: 510 INJECTION INTRAVENOUS at 12:55

## 2024-05-20 SDOH — ECONOMIC STABILITY: TRANSPORTATION INSECURITY
IN THE PAST 12 MONTHS, HAS LACK OF TRANSPORTATION KEPT YOU FROM MEETINGS, WORK, OR FROM GETTING THINGS NEEDED FOR DAILY LIVING?: NO

## 2024-05-20 SDOH — ECONOMIC STABILITY: HOUSING INSECURITY
IN THE LAST 12 MONTHS, WAS THERE A TIME WHEN YOU DID NOT HAVE A STEADY PLACE TO SLEEP OR SLEPT IN A SHELTER (INCLUDING NOW)?: NO

## 2024-05-20 SDOH — ECONOMIC STABILITY: INCOME INSECURITY: IN THE LAST 12 MONTHS, WAS THERE A TIME WHEN YOU WERE NOT ABLE TO PAY THE MORTGAGE OR RENT ON TIME?: NO

## 2024-05-20 SDOH — ECONOMIC STABILITY: TRANSPORTATION INSECURITY
IN THE PAST 12 MONTHS, HAS THE LACK OF TRANSPORTATION KEPT YOU FROM MEDICAL APPOINTMENTS OR FROM GETTING MEDICATIONS?: NO

## 2024-05-20 ASSESSMENT — ENCOUNTER SYMPTOMS
DEPRESSION: 0
OCCASIONAL FEELINGS OF UNSTEADINESS: 0
LOSS OF SENSATION IN FEET: 0

## 2024-05-20 ASSESSMENT — SOCIAL DETERMINANTS OF HEALTH (SDOH): HOW HARD IS IT FOR YOU TO PAY FOR THE VERY BASICS LIKE FOOD, HOUSING, MEDICAL CARE, AND HEATING?: NOT VERY HARD

## 2024-05-20 ASSESSMENT — PAIN SCALES - GENERAL: PAINLEVEL: 0-NO PAIN

## 2024-05-21 LAB — VWF AG ACT/NOR PPP IA: 97 % (ref 50–220)

## 2024-05-28 LAB — VWF:RCO ACT/NOR PPP PL AGG: 137 % (ref 51–215)

## 2024-05-29 LAB
VWF CBA INHIBITOR PPP CHRO-ACNC: 162 IU/DL (ref 50–203)
VWF GP1BM ACTIVITY: 124 IU/DL (ref 52–180)

## 2024-06-03 ENCOUNTER — INFUSION (OUTPATIENT)
Dept: HEMATOLOGY/ONCOLOGY | Facility: HOSPITAL | Age: 29
End: 2024-06-03
Payer: COMMERCIAL

## 2024-06-03 VITALS
TEMPERATURE: 97 F | SYSTOLIC BLOOD PRESSURE: 134 MMHG | RESPIRATION RATE: 17 BRPM | OXYGEN SATURATION: 96 % | DIASTOLIC BLOOD PRESSURE: 63 MMHG | HEART RATE: 73 BPM

## 2024-06-03 DIAGNOSIS — E53.8 B12 DEFICIENCY: ICD-10-CM

## 2024-06-03 DIAGNOSIS — D64.89 ANEMIA DUE TO OTHER CAUSE, NOT CLASSIFIED: ICD-10-CM

## 2024-06-03 DIAGNOSIS — Z86.2 H/O IRON DEFICIENCY ANEMIA: ICD-10-CM

## 2024-06-03 DIAGNOSIS — K90.9 IRON MALABSORPTION (HHS-HCC): ICD-10-CM

## 2024-06-03 LAB — VWF MULTIMERS PPP IB: NORMAL

## 2024-06-03 PROCEDURE — 2500000004 HC RX 250 GENERAL PHARMACY W/ HCPCS (ALT 636 FOR OP/ED)

## 2024-06-03 PROCEDURE — 82397 CHEMILUMINESCENT ASSAY: CPT

## 2024-06-03 PROCEDURE — 96372 THER/PROPH/DIAG INJ SC/IM: CPT

## 2024-06-03 PROCEDURE — 96365 THER/PROPH/DIAG IV INF INIT: CPT | Mod: INF

## 2024-06-03 RX ORDER — DIPHENHYDRAMINE HYDROCHLORIDE 50 MG/ML
50 INJECTION INTRAMUSCULAR; INTRAVENOUS AS NEEDED
OUTPATIENT
Start: 2024-06-10

## 2024-06-03 RX ORDER — CYANOCOBALAMIN 1000 UG/ML
1000 INJECTION, SOLUTION INTRAMUSCULAR; SUBCUTANEOUS ONCE
Status: COMPLETED | OUTPATIENT
Start: 2024-06-03 | End: 2024-06-03

## 2024-06-03 RX ORDER — ALBUTEROL SULFATE 0.83 MG/ML
3 SOLUTION RESPIRATORY (INHALATION) AS NEEDED
OUTPATIENT
Start: 2024-06-10

## 2024-06-03 RX ORDER — FAMOTIDINE 10 MG/ML
20 INJECTION INTRAVENOUS ONCE AS NEEDED
OUTPATIENT
Start: 2024-06-10

## 2024-06-03 RX ORDER — DIPHENHYDRAMINE HYDROCHLORIDE 50 MG/ML
50 INJECTION INTRAMUSCULAR; INTRAVENOUS AS NEEDED
OUTPATIENT
Start: 2024-06-03

## 2024-06-03 RX ORDER — EPINEPHRINE 0.3 MG/.3ML
0.3 INJECTION SUBCUTANEOUS EVERY 5 MIN PRN
OUTPATIENT
Start: 2024-06-10

## 2024-06-03 RX ORDER — EPINEPHRINE 0.3 MG/.3ML
0.3 INJECTION SUBCUTANEOUS EVERY 5 MIN PRN
OUTPATIENT
Start: 2024-06-03

## 2024-06-03 RX ORDER — FAMOTIDINE 10 MG/ML
20 INJECTION INTRAVENOUS ONCE AS NEEDED
OUTPATIENT
Start: 2024-06-03

## 2024-06-03 RX ORDER — ALBUTEROL SULFATE 0.83 MG/ML
3 SOLUTION RESPIRATORY (INHALATION) AS NEEDED
OUTPATIENT
Start: 2024-06-03

## 2024-06-03 RX ORDER — CYANOCOBALAMIN 1000 UG/ML
1000 INJECTION, SOLUTION INTRAMUSCULAR; SUBCUTANEOUS ONCE
OUTPATIENT
Start: 2024-06-10

## 2024-06-03 RX ADMIN — CYANOCOBALAMIN 1000 MCG: 1000 INJECTION, SOLUTION INTRAMUSCULAR at 09:38

## 2024-06-03 RX ADMIN — FERUMOXYTOL 510 MG: 510 INJECTION INTRAVENOUS at 09:37

## 2024-06-03 ASSESSMENT — PAIN SCALES - GENERAL: PAINLEVEL: 0-NO PAIN

## 2024-06-04 LAB — SCAN RESULT: NORMAL

## 2024-06-10 ENCOUNTER — APPOINTMENT (OUTPATIENT)
Dept: HEMATOLOGY/ONCOLOGY | Facility: HOSPITAL | Age: 29
End: 2024-06-10
Payer: COMMERCIAL

## 2024-06-10 ENCOUNTER — APPOINTMENT (OUTPATIENT)
Dept: BEHAVIORAL HEALTH | Facility: CLINIC | Age: 29
End: 2024-06-10
Payer: COMMERCIAL

## 2024-06-13 ENCOUNTER — TELEPHONE (OUTPATIENT)
Dept: HEMATOLOGY/ONCOLOGY | Facility: HOSPITAL | Age: 29
End: 2024-06-13
Payer: COMMERCIAL

## 2024-06-13 NOTE — TELEPHONE ENCOUNTER
Patient is scheduled for an in-person visit with STIVEN Cordero on Monday, 11/11/24 at 10 AM. Due to recent template change, provider will be doing virtual visits only on Mondays. Reached out to patient to offer virtual visit for same date or to reschedule to in-person visit. Patient agreeable to change appointment to virtual visit. Appointment changed to phone visit. Patient verbalized understanding and agreed to appointment changes.

## 2024-07-15 ENCOUNTER — INFUSION (OUTPATIENT)
Dept: HEMATOLOGY/ONCOLOGY | Facility: HOSPITAL | Age: 29
End: 2024-07-15
Payer: COMMERCIAL

## 2024-07-15 VITALS
BODY MASS INDEX: 45.2 KG/M2 | WEIGHT: 315 LBS | RESPIRATION RATE: 16 BRPM | SYSTOLIC BLOOD PRESSURE: 144 MMHG | OXYGEN SATURATION: 97 % | DIASTOLIC BLOOD PRESSURE: 83 MMHG | TEMPERATURE: 97.5 F | HEART RATE: 89 BPM

## 2024-07-15 DIAGNOSIS — Z86.2 H/O IRON DEFICIENCY ANEMIA: ICD-10-CM

## 2024-07-15 DIAGNOSIS — K90.9 IRON MALABSORPTION (HHS-HCC): ICD-10-CM

## 2024-07-15 DIAGNOSIS — E53.8 B12 DEFICIENCY: ICD-10-CM

## 2024-07-15 DIAGNOSIS — D64.89 ANEMIA DUE TO OTHER CAUSE, NOT CLASSIFIED: ICD-10-CM

## 2024-07-15 PROCEDURE — 2500000004 HC RX 250 GENERAL PHARMACY W/ HCPCS (ALT 636 FOR OP/ED)

## 2024-07-15 PROCEDURE — 96372 THER/PROPH/DIAG INJ SC/IM: CPT

## 2024-07-15 RX ORDER — FAMOTIDINE 10 MG/ML
20 INJECTION INTRAVENOUS ONCE AS NEEDED
OUTPATIENT
Start: 2024-07-29

## 2024-07-15 RX ORDER — FAMOTIDINE 10 MG/ML
20 INJECTION INTRAVENOUS ONCE AS NEEDED
OUTPATIENT
Start: 2024-07-15

## 2024-07-15 RX ORDER — CYANOCOBALAMIN 1000 UG/ML
1000 INJECTION, SOLUTION INTRAMUSCULAR; SUBCUTANEOUS ONCE
OUTPATIENT
Start: 2024-07-29

## 2024-07-15 RX ORDER — CYANOCOBALAMIN 1000 UG/ML
1000 INJECTION, SOLUTION INTRAMUSCULAR; SUBCUTANEOUS ONCE
Status: DISCONTINUED | OUTPATIENT
Start: 2024-07-15 | End: 2024-07-15 | Stop reason: HOSPADM

## 2024-07-15 RX ORDER — ALBUTEROL SULFATE 0.83 MG/ML
3 SOLUTION RESPIRATORY (INHALATION) AS NEEDED
OUTPATIENT
Start: 2024-07-29

## 2024-07-15 RX ORDER — HEPARIN 100 UNIT/ML
500 SYRINGE INTRAVENOUS AS NEEDED
OUTPATIENT
Start: 2024-07-15

## 2024-07-15 RX ORDER — DIPHENHYDRAMINE HYDROCHLORIDE 50 MG/ML
50 INJECTION INTRAMUSCULAR; INTRAVENOUS AS NEEDED
OUTPATIENT
Start: 2024-07-15

## 2024-07-15 RX ORDER — HEPARIN SODIUM,PORCINE/PF 10 UNIT/ML
50 SYRINGE (ML) INTRAVENOUS AS NEEDED
Status: DISCONTINUED | OUTPATIENT
Start: 2024-07-15 | End: 2024-07-15 | Stop reason: HOSPADM

## 2024-07-15 RX ORDER — DIPHENHYDRAMINE HYDROCHLORIDE 50 MG/ML
50 INJECTION INTRAMUSCULAR; INTRAVENOUS AS NEEDED
Status: DISCONTINUED | OUTPATIENT
Start: 2024-07-15 | End: 2024-07-15 | Stop reason: HOSPADM

## 2024-07-15 RX ORDER — EPINEPHRINE 0.3 MG/.3ML
0.3 INJECTION SUBCUTANEOUS EVERY 5 MIN PRN
OUTPATIENT
Start: 2024-07-29

## 2024-07-15 RX ORDER — ALBUTEROL SULFATE 0.83 MG/ML
3 SOLUTION RESPIRATORY (INHALATION) AS NEEDED
OUTPATIENT
Start: 2024-07-15

## 2024-07-15 RX ORDER — EPINEPHRINE 0.3 MG/.3ML
0.3 INJECTION SUBCUTANEOUS EVERY 5 MIN PRN
OUTPATIENT
Start: 2024-07-15

## 2024-07-15 RX ORDER — FAMOTIDINE 10 MG/ML
20 INJECTION INTRAVENOUS ONCE AS NEEDED
Status: DISCONTINUED | OUTPATIENT
Start: 2024-07-15 | End: 2024-07-15 | Stop reason: HOSPADM

## 2024-07-15 RX ORDER — HEPARIN 100 UNIT/ML
500 SYRINGE INTRAVENOUS AS NEEDED
Status: DISCONTINUED | OUTPATIENT
Start: 2024-07-15 | End: 2024-07-15 | Stop reason: HOSPADM

## 2024-07-15 RX ORDER — ALBUTEROL SULFATE 0.83 MG/ML
3 SOLUTION RESPIRATORY (INHALATION) AS NEEDED
Status: DISCONTINUED | OUTPATIENT
Start: 2024-07-15 | End: 2024-07-15 | Stop reason: HOSPADM

## 2024-07-15 RX ORDER — CYANOCOBALAMIN 1000 UG/ML
1000 INJECTION, SOLUTION INTRAMUSCULAR; SUBCUTANEOUS ONCE
Status: DISCONTINUED | OUTPATIENT
Start: 2024-07-15 | End: 2024-07-15

## 2024-07-15 RX ORDER — DIPHENHYDRAMINE HYDROCHLORIDE 50 MG/ML
50 INJECTION INTRAMUSCULAR; INTRAVENOUS AS NEEDED
OUTPATIENT
Start: 2024-07-29

## 2024-07-15 RX ORDER — EPINEPHRINE 0.3 MG/.3ML
0.3 INJECTION SUBCUTANEOUS EVERY 5 MIN PRN
Status: DISCONTINUED | OUTPATIENT
Start: 2024-07-15 | End: 2024-07-15 | Stop reason: HOSPADM

## 2024-07-15 RX ORDER — HEPARIN SODIUM,PORCINE/PF 10 UNIT/ML
50 SYRINGE (ML) INTRAVENOUS AS NEEDED
OUTPATIENT
Start: 2024-07-15

## 2024-07-15 ASSESSMENT — PAIN SCALES - GENERAL: PAINLEVEL: 0-NO PAIN

## 2024-07-26 ENCOUNTER — TELEPHONE (OUTPATIENT)
Dept: HEMATOLOGY/ONCOLOGY | Facility: HOSPITAL | Age: 29
End: 2024-07-26
Payer: COMMERCIAL

## 2024-07-26 NOTE — TELEPHONE ENCOUNTER
Reached out to patient regarding Infusion appt for 7/29. Patient does not have orders for Feraheme. Patients appt will be cancelled and we will advise when we know more.

## 2024-07-29 ENCOUNTER — APPOINTMENT (OUTPATIENT)
Dept: HEMATOLOGY/ONCOLOGY | Facility: HOSPITAL | Age: 29
End: 2024-07-29
Payer: COMMERCIAL

## 2024-08-16 DIAGNOSIS — E53.8 B12 DEFICIENCY: Primary | ICD-10-CM

## 2024-08-16 DIAGNOSIS — D64.89 ANEMIA DUE TO OTHER CAUSE, NOT CLASSIFIED: ICD-10-CM

## 2024-08-16 DIAGNOSIS — E53.8 FOLATE DEFICIENCY: ICD-10-CM

## 2024-08-16 DIAGNOSIS — K90.9 IRON MALABSORPTION (HHS-HCC): ICD-10-CM

## 2024-08-26 ENCOUNTER — INFUSION (OUTPATIENT)
Dept: HEMATOLOGY/ONCOLOGY | Facility: HOSPITAL | Age: 29
End: 2024-08-26
Payer: COMMERCIAL

## 2024-08-26 VITALS
TEMPERATURE: 97.3 F | DIASTOLIC BLOOD PRESSURE: 90 MMHG | SYSTOLIC BLOOD PRESSURE: 144 MMHG | OXYGEN SATURATION: 96 % | RESPIRATION RATE: 16 BRPM | HEART RATE: 87 BPM

## 2024-08-26 DIAGNOSIS — E53.8 B12 DEFICIENCY: ICD-10-CM

## 2024-08-26 PROCEDURE — 2500000004 HC RX 250 GENERAL PHARMACY W/ HCPCS (ALT 636 FOR OP/ED)

## 2024-08-26 PROCEDURE — 96372 THER/PROPH/DIAG INJ SC/IM: CPT

## 2024-08-26 RX ORDER — CYANOCOBALAMIN 1000 UG/ML
1000 INJECTION, SOLUTION INTRAMUSCULAR; SUBCUTANEOUS ONCE
OUTPATIENT
Start: 2024-09-09

## 2024-08-26 RX ORDER — FAMOTIDINE 10 MG/ML
20 INJECTION INTRAVENOUS ONCE AS NEEDED
OUTPATIENT
Start: 2024-09-09

## 2024-08-26 RX ORDER — DIPHENHYDRAMINE HYDROCHLORIDE 50 MG/ML
50 INJECTION INTRAMUSCULAR; INTRAVENOUS AS NEEDED
OUTPATIENT
Start: 2024-09-09

## 2024-08-26 RX ORDER — CYANOCOBALAMIN 1000 UG/ML
1000 INJECTION, SOLUTION INTRAMUSCULAR; SUBCUTANEOUS ONCE
Status: COMPLETED | OUTPATIENT
Start: 2024-08-26 | End: 2024-08-26

## 2024-08-26 RX ORDER — ALBUTEROL SULFATE 0.83 MG/ML
3 SOLUTION RESPIRATORY (INHALATION) AS NEEDED
OUTPATIENT
Start: 2024-09-09

## 2024-08-26 RX ORDER — EPINEPHRINE 0.3 MG/.3ML
0.3 INJECTION SUBCUTANEOUS EVERY 5 MIN PRN
OUTPATIENT
Start: 2024-09-09

## 2024-08-26 ASSESSMENT — PAIN SCALES - GENERAL: PAINLEVEL: 0-NO PAIN

## 2024-09-09 ENCOUNTER — APPOINTMENT (OUTPATIENT)
Dept: HEMATOLOGY/ONCOLOGY | Facility: HOSPITAL | Age: 29
End: 2024-09-09
Payer: COMMERCIAL

## 2024-09-23 ENCOUNTER — INFUSION (OUTPATIENT)
Dept: HEMATOLOGY/ONCOLOGY | Facility: HOSPITAL | Age: 29
End: 2024-09-23
Payer: COMMERCIAL

## 2024-09-23 VITALS
SYSTOLIC BLOOD PRESSURE: 138 MMHG | TEMPERATURE: 96.4 F | HEART RATE: 79 BPM | RESPIRATION RATE: 16 BRPM | DIASTOLIC BLOOD PRESSURE: 77 MMHG | OXYGEN SATURATION: 97 %

## 2024-09-23 DIAGNOSIS — E53.8 B12 DEFICIENCY: ICD-10-CM

## 2024-09-23 PROCEDURE — 96372 THER/PROPH/DIAG INJ SC/IM: CPT

## 2024-09-23 PROCEDURE — 2500000004 HC RX 250 GENERAL PHARMACY W/ HCPCS (ALT 636 FOR OP/ED)

## 2024-09-23 RX ORDER — CYANOCOBALAMIN 1000 UG/ML
1000 INJECTION, SOLUTION INTRAMUSCULAR; SUBCUTANEOUS ONCE
OUTPATIENT
Start: 2024-10-21

## 2024-09-23 RX ORDER — CYANOCOBALAMIN 1000 UG/ML
1000 INJECTION, SOLUTION INTRAMUSCULAR; SUBCUTANEOUS ONCE
Status: COMPLETED | OUTPATIENT
Start: 2024-09-23 | End: 2024-09-23

## 2024-09-23 RX ORDER — EPINEPHRINE 0.3 MG/.3ML
0.3 INJECTION SUBCUTANEOUS EVERY 5 MIN PRN
OUTPATIENT
Start: 2024-10-21

## 2024-09-23 RX ORDER — FAMOTIDINE 10 MG/ML
20 INJECTION INTRAVENOUS ONCE AS NEEDED
OUTPATIENT
Start: 2024-10-21

## 2024-09-23 RX ORDER — ALBUTEROL SULFATE 0.83 MG/ML
3 SOLUTION RESPIRATORY (INHALATION) AS NEEDED
OUTPATIENT
Start: 2024-10-21

## 2024-09-23 RX ORDER — DIPHENHYDRAMINE HYDROCHLORIDE 50 MG/ML
50 INJECTION INTRAMUSCULAR; INTRAVENOUS AS NEEDED
OUTPATIENT
Start: 2024-10-21

## 2024-09-23 ASSESSMENT — PAIN SCALES - GENERAL: PAINLEVEL: 0-NO PAIN

## 2024-10-21 ENCOUNTER — INFUSION (OUTPATIENT)
Dept: HEMATOLOGY/ONCOLOGY | Facility: HOSPITAL | Age: 29
End: 2024-10-21
Payer: COMMERCIAL

## 2024-10-21 VITALS
RESPIRATION RATE: 16 BRPM | SYSTOLIC BLOOD PRESSURE: 137 MMHG | DIASTOLIC BLOOD PRESSURE: 95 MMHG | HEART RATE: 102 BPM | TEMPERATURE: 97.3 F | OXYGEN SATURATION: 97 %

## 2024-10-21 DIAGNOSIS — K90.9 IRON MALABSORPTION (HHS-HCC): ICD-10-CM

## 2024-10-21 DIAGNOSIS — D64.89 ANEMIA DUE TO OTHER CAUSE, NOT CLASSIFIED: ICD-10-CM

## 2024-10-21 DIAGNOSIS — E53.8 FOLATE DEFICIENCY: ICD-10-CM

## 2024-10-21 DIAGNOSIS — E53.8 B12 DEFICIENCY: ICD-10-CM

## 2024-10-21 LAB
ALBUMIN SERPL BCP-MCNC: 4.1 G/DL (ref 3.4–5)
ALP SERPL-CCNC: 56 U/L (ref 33–120)
ALT SERPL W P-5'-P-CCNC: 29 U/L (ref 10–52)
ANION GAP SERPL CALC-SCNC: 12 MMOL/L (ref 10–20)
AST SERPL W P-5'-P-CCNC: 27 U/L (ref 9–39)
BASOPHILS # BLD AUTO: 0.03 X10*3/UL (ref 0–0.1)
BASOPHILS NFR BLD AUTO: 0.5 %
BILIRUB SERPL-MCNC: 0.8 MG/DL (ref 0–1.2)
BUN SERPL-MCNC: 9 MG/DL (ref 6–23)
CALCIUM SERPL-MCNC: 8.7 MG/DL (ref 8.6–10.3)
CHLORIDE SERPL-SCNC: 107 MMOL/L (ref 98–107)
CO2 SERPL-SCNC: 24 MMOL/L (ref 21–32)
CREAT SERPL-MCNC: 1.04 MG/DL (ref 0.5–1.3)
EGFRCR SERPLBLD CKD-EPI 2021: >90 ML/MIN/1.73M*2
EOSINOPHIL # BLD AUTO: 0.39 X10*3/UL (ref 0–0.7)
EOSINOPHIL NFR BLD AUTO: 6.5 %
ERYTHROCYTE [DISTWIDTH] IN BLOOD BY AUTOMATED COUNT: 17.2 % (ref 11.5–14.5)
FERRITIN SERPL-MCNC: 8 NG/ML (ref 20–300)
FOLATE SERPL-MCNC: 11 NG/ML
GLUCOSE SERPL-MCNC: 119 MG/DL (ref 74–99)
HCT VFR BLD AUTO: 31.1 % (ref 41–52)
HGB BLD-MCNC: 8.4 G/DL (ref 13.5–17.5)
HGB RETIC QN: 15 PG (ref 28–38)
IMM GRANULOCYTES # BLD AUTO: 0.03 X10*3/UL (ref 0–0.7)
IMM GRANULOCYTES NFR BLD AUTO: 0.5 % (ref 0–0.9)
IMMATURE RETIC FRACTION: 19.8 %
IRON SATN MFR SERPL: 5 % (ref 25–45)
IRON SERPL-MCNC: 24 UG/DL (ref 35–150)
LYMPHOCYTES # BLD AUTO: 1.04 X10*3/UL (ref 1.2–4.8)
LYMPHOCYTES NFR BLD AUTO: 17.2 %
MCH RBC QN AUTO: 18.4 PG (ref 26–34)
MCHC RBC AUTO-ENTMCNC: 27 G/DL (ref 32–36)
MCV RBC AUTO: 68 FL (ref 80–100)
MONOCYTES # BLD AUTO: 0.46 X10*3/UL (ref 0.1–1)
MONOCYTES NFR BLD AUTO: 7.6 %
NEUTROPHILS # BLD AUTO: 4.08 X10*3/UL (ref 1.2–7.7)
NEUTROPHILS NFR BLD AUTO: 67.7 %
NRBC BLD-RTO: 0 /100 WBCS (ref 0–0)
PLATELET # BLD AUTO: 261 X10*3/UL (ref 150–450)
POLYCHROMASIA BLD QL SMEAR: NORMAL
POTASSIUM SERPL-SCNC: 3.9 MMOL/L (ref 3.5–5.3)
PROT SERPL-MCNC: 6.8 G/DL (ref 6.4–8.2)
RBC # BLD AUTO: 4.56 X10*6/UL (ref 4.5–5.9)
RBC MORPH BLD: NORMAL
RETICS #: 0.08 X10*6/UL (ref 0.02–0.12)
RETICS/RBC NFR AUTO: 1.8 % (ref 0.5–2)
SODIUM SERPL-SCNC: 139 MMOL/L (ref 136–145)
TIBC SERPL-MCNC: 457 UG/DL (ref 240–445)
UIBC SERPL-MCNC: 433 UG/DL (ref 110–370)
VIT B12 SERPL-MCNC: 558 PG/ML (ref 211–911)
WBC # BLD AUTO: 6 X10*3/UL (ref 4.4–11.3)

## 2024-10-21 PROCEDURE — 82728 ASSAY OF FERRITIN: CPT

## 2024-10-21 PROCEDURE — 83540 ASSAY OF IRON: CPT

## 2024-10-21 PROCEDURE — 96372 THER/PROPH/DIAG INJ SC/IM: CPT

## 2024-10-21 PROCEDURE — 2500000004 HC RX 250 GENERAL PHARMACY W/ HCPCS (ALT 636 FOR OP/ED)

## 2024-10-21 PROCEDURE — 80053 COMPREHEN METABOLIC PANEL: CPT

## 2024-10-21 PROCEDURE — 85045 AUTOMATED RETICULOCYTE COUNT: CPT

## 2024-10-21 PROCEDURE — 85025 COMPLETE CBC W/AUTO DIFF WBC: CPT

## 2024-10-21 PROCEDURE — 82746 ASSAY OF FOLIC ACID SERUM: CPT | Mod: GENLAB

## 2024-10-21 PROCEDURE — 82607 VITAMIN B-12: CPT | Mod: GENLAB

## 2024-10-21 RX ORDER — CYANOCOBALAMIN 1000 UG/ML
1000 INJECTION, SOLUTION INTRAMUSCULAR; SUBCUTANEOUS ONCE
OUTPATIENT
Start: 2024-11-18

## 2024-10-21 RX ORDER — HEPARIN SODIUM,PORCINE/PF 10 UNIT/ML
50 SYRINGE (ML) INTRAVENOUS AS NEEDED
Status: DISCONTINUED | OUTPATIENT
Start: 2024-10-21 | End: 2024-10-21 | Stop reason: HOSPADM

## 2024-10-21 RX ORDER — HEPARIN 100 UNIT/ML
500 SYRINGE INTRAVENOUS AS NEEDED
Status: DISCONTINUED | OUTPATIENT
Start: 2024-10-21 | End: 2024-10-21 | Stop reason: HOSPADM

## 2024-10-21 RX ORDER — EPINEPHRINE 0.3 MG/.3ML
0.3 INJECTION SUBCUTANEOUS EVERY 5 MIN PRN
OUTPATIENT
Start: 2024-11-18

## 2024-10-21 RX ORDER — ALBUTEROL SULFATE 0.83 MG/ML
3 SOLUTION RESPIRATORY (INHALATION) AS NEEDED
OUTPATIENT
Start: 2024-11-18

## 2024-10-21 RX ORDER — DIPHENHYDRAMINE HYDROCHLORIDE 50 MG/ML
50 INJECTION INTRAMUSCULAR; INTRAVENOUS AS NEEDED
OUTPATIENT
Start: 2024-11-18

## 2024-10-21 RX ORDER — FAMOTIDINE 10 MG/ML
20 INJECTION INTRAVENOUS ONCE AS NEEDED
OUTPATIENT
Start: 2024-11-18

## 2024-10-21 RX ORDER — CYANOCOBALAMIN 1000 UG/ML
1000 INJECTION, SOLUTION INTRAMUSCULAR; SUBCUTANEOUS ONCE
Status: COMPLETED | OUTPATIENT
Start: 2024-10-21 | End: 2024-10-21

## 2024-10-21 RX ORDER — HEPARIN 100 UNIT/ML
500 SYRINGE INTRAVENOUS AS NEEDED
OUTPATIENT
Start: 2024-10-21

## 2024-10-21 RX ORDER — HEPARIN SODIUM,PORCINE/PF 10 UNIT/ML
50 SYRINGE (ML) INTRAVENOUS AS NEEDED
OUTPATIENT
Start: 2024-10-21

## 2024-10-21 ASSESSMENT — PAIN SCALES - GENERAL: PAINLEVEL_OUTOF10: 0-NO PAIN

## 2024-10-21 ASSESSMENT — SOCIAL DETERMINANTS OF HEALTH (SDOH): HOW HARD IS IT FOR YOU TO PAY FOR THE VERY BASICS LIKE FOOD, HOUSING, MEDICAL CARE, AND HEATING?: SOMEWHAT HARD

## 2024-10-22 DIAGNOSIS — Z86.2 H/O IRON DEFICIENCY ANEMIA: Primary | ICD-10-CM

## 2024-10-22 DIAGNOSIS — K90.9 IRON MALABSORPTION (HHS-HCC): ICD-10-CM

## 2024-10-23 ENCOUNTER — TELEPHONE (OUTPATIENT)
Dept: HEMATOLOGY/ONCOLOGY | Facility: HOSPITAL | Age: 29
End: 2024-10-23
Payer: COMMERCIAL

## 2024-10-23 NOTE — TELEPHONE ENCOUNTER
----- Message from Bev Robles sent at 10/22/2024  3:58 PM EDT -----  He needs IV iron. I have ordered.  ----- Message -----  From: Lab, Background User  Sent: 10/21/2024   1:12 PM EDT  To: Bev Robles, APRN-CNP

## 2024-10-23 NOTE — TELEPHONE ENCOUNTER
Received attached staff message from provider. Provider ordered Feraheme x 2 doses. Reached out to patient to inform him and schedule infusions. Patient states he is not sure he will be able to afford infusions. Per patient, he already reviewed his lab results and started taking gummies and pills. At this time, he would like to see how well the gummies and pills work before scheduling iron infusions due to financial reasons. Staff message sent to ordering provider to make her aware. Infusion appointment request cancelled.

## 2024-11-08 DIAGNOSIS — Z86.2 H/O IRON DEFICIENCY ANEMIA: Primary | ICD-10-CM

## 2024-11-08 DIAGNOSIS — K90.9 IRON MALABSORPTION (HHS-HCC): ICD-10-CM

## 2024-11-11 ENCOUNTER — TELEMEDICINE (OUTPATIENT)
Dept: HEMATOLOGY/ONCOLOGY | Facility: HOSPITAL | Age: 29
End: 2024-11-11
Payer: COMMERCIAL

## 2024-11-11 DIAGNOSIS — E53.8 B12 DEFICIENCY: ICD-10-CM

## 2024-11-11 DIAGNOSIS — K44.9 HIATAL HERNIA: Primary | ICD-10-CM

## 2024-11-11 DIAGNOSIS — K90.9 IRON MALABSORPTION (HHS-HCC): ICD-10-CM

## 2024-11-11 DIAGNOSIS — E53.8 FOLATE DEFICIENCY: ICD-10-CM

## 2024-11-11 PROCEDURE — 99214 OFFICE O/P EST MOD 30 MIN: CPT

## 2024-11-11 NOTE — PROGRESS NOTES
"[facility]    PATIENT VISIT INFORMATION    Visit Type: Follow up   I performed this visit using real-time telehealth tools, including an audio/video connection between (Melvin Foley is at his home) and (Bev Robles, CNP [facility])  Patient consents to telemedicine service today and understands the limitations of the visit, no physical examination and all issues may not be able to be addressed today, other than brief neuro and psych assessment.   Referring Provider: [facility]  Reason for referral: Severe anemia     CANCER/HEMATOLGOY HISTORY    Patient is a 29 -year-old white man referred for severe anemia, splenomegaly, and abnormal CT with bowel thickening and hiatal hernia as well as  subcentimeter paraesophageal lymph nodes, details outlined below.  Patient was admitted 6/15/2021 for 1 day with generalized weakness and lightheadedness with a hemoglobin of 4s, noted to have severe iron deficiency anemia, was given 3 units packed red  blood cells, discharged on ferrous sulfate 3 times a day, recommended outpatient endoscopy.   Patient stated that he took iron 3 tablets, 3 times a day, 9 tablets/day for 1 month, ran out, then  took over-the-counter 1/day, no significant side effects even with this high dose of oral iron.  Patient was seen by PCP 6/23/2021 for follow-up anemia with B12 injection given, cough improved  after he was treated for bronchitis 6/2021 with methylprednisolone and azithromycin, chronic medical issues; patient said he only had the one B12 injection and then was requested to see me .  Patient was back in the ER 7/19/2021 with some intermittent upper abdominal discomfort, ran out of a \"little brown pill for my spleen\" for 1 week, intermittent dizziness and headaches over the past week; they refilled his pantoprazole and recommended  follow-up with hematology.     Given persistent iron deficiency despite PO iron, January 2022 Feraheme was given x2 doses, " stopped oral iron in August 2022 given likely  ineffective, Feraheme again given 12/2022 x 2 doses, 3/2023 x2 doses (hemoglobin down to 9.9 without any visible bleeding), 4/2023 x2 doses, 7/2023 x 2 doses.  9/25/2023 patient refused Feraheme after 2 IV unsuccessful attempts, and I requested information to be given to the patient about a port-11/6/2023 patient initially said he never got information about a port, just said that a port existed, but as I was walking out the door said that he did get information mailed to him, is still undecided if he wants a port.  11/6/2023 patient stated that there is only a 1 or 2 nurses that can get his IV, cannot give me the name of those nurses, says that he only takes iron if he feels bad even though I told him he likely does not absorb that.  9/1/2023 provider Priscila recommended an EGD and colonoscopy.  10/27/2023 Dr. Pfeiffer performed a colonoscopy showing retained stool with prior colonoscopy normal and same-day EGD with source of bleeding so likely not necessary to redo, EGD with 6 cm hiatal hernia with Sachin's lesions, normal-appearing duodenum with biopsy still pending; felt iron deficiency was secondary to hiatal hernia with Sachin's erosions, has an appointment with Dr. Hyatt general surgery 12/4/2023.     Hemoglobin was at the upper limit of normal various times in 2022, erythropoietin was slightly elevated so referred  to Dr. Valenzuela who saw him 9/19/2022 with no signs or symptoms of a pulmonary disorder or sleep apnea, follow-up as needed; erythropoietin was again elevated so CT chest/abdomen/pelvis was performed 12/26/2022 showing no evidence of malignancy but  hepatosplenomegaly with fatty infiltration, referred to Dr. Pfeiffer with hepatology who saw him 5/19/2023 with labs and FibroScan ordered along with weight loss program, likely NAFLD, follow-up in 6 months; he still has not done any of this testing yet except for the labs.  He  has received B12  "injections monthly, most recently today 11/6/2023.  He does not necessarily feel any different on all of this vitamin supplementation,  even after IV iron.  10/14/21 Dr. Franco an EGD and colonoscopy with normal colonoscopy, EGD with LA grade B reflux esophagitis without bleeding, 3 cm hiatal hernia, multiple Sachin's ulcers, acute gastritis; pathology showed unremarkable duodenal biopsy, stomach with reactive gastropathy but negative H. pylori; patient started on the PPI with no change in any of his symptoms.  7/18/2022 he met with gastroenterology provider Charito who recommended continuing PPI, no endoscopy because symptoms and labs  better, referred for a weight loss specialist; he saw HOLGER Mcneill CNP again 1/16/2023 noting no endoscopy due to anemia.      Since 2015 patient has been on a gluten-free diet because of a history of multiple food allergies including gluten, never officially diagnosed with celiac disease.  His previous symptoms of headache, dizziness, presyncope have significantly improved  although he still has intermittent mild headaches but no more dizziness/presyncope.  8/15/2022 he was having intermittent mild abdominal pain-still happens rarely, stable.        HISTORY OF PRESENT ILLNESS     ID Statement: Melvin Foley is a 29 year old male     Chief Complaint: \"I feel groggy\"     Interval History:   Melvin presents today for follow up and feels \"groggy.\" No PICA. No loss of energy.  Last Feraheme April 1, 2024.  He reports fatigue at his baseline, though never feels different.  New insurance with high co pays. Improved healthy diet and exercise. He does not have a primary. Referred to Francisco Chavez CNP for PCP establishment and he is unable due to work schedule. He has hiatal hernia that needs repaired and followed surgery and he reports he was told that he must have gastric bypass. Requesting new referral for consult.     No fevers, unintentional weight loss, sore throat, acute vision changes, " "chest pain, edema, shortness breath, cough, nausea or vomiting  now, abnormal bowel movements now, no blood in stool, dysuria or hematuria, bone/back/muscle/joint pain, numbness, focal weakness, tingling, rashes or lumps, abnormal bruising or bleeding, diabetes  or thyroid disorder although elevated glucose on labs, anxiety or depression. Denies bruising or bleeding from any location.     PAST/CURRENT HISTORY     MEDICAL/SURGICAL HISTORY  -Anemia   -bowel thickening  -iron and B1 2 deficiency   -Hepatosplenomegaly 12/26/2022.  Following GI, FibroScan ordered along with weight loss program, likely NAFLD.   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     OCCUPATIONAL/ENVIRONMENTAL HISTORY/EXPOSURES:  -none reported    Active Problems, Allergy List, Medication List, and PMH/PSH/FH/Social Hx have been reviewed and reconciled in chart. Updates made when necessary.     REVIEW OF SYSTEMS   A review of systems has been completed and are negative for complaints except what is stated in the assessment, HPI, IH, ROS, and/or past medical history.    ALLERGIES AND MEDICATIONS     Allergies and " Intolerances:   Allergies   Allergen Reactions    Gluten Anaphylaxis    Cod Liver Oil Other    Corn Other    Egg Other    Milk Containing Products (Dairy) Other    Peanut Other    [place] Other    Egg Extract Other    Food Extracts GI Upset     Was tested & has several food allergies      Medication Profile:   Current Outpatient Medications   Medication Instructions    cyanocobalamin (Vitamin B-12) 2,500 mcg tablet TAKE 1 TABLET BY MOUTH THREE TIMES A WEEK    EPINEPHrine (EpiPen 2-Adam) 0.3 mg/0.3 mL injection syringe USE AS DIRECTED IN CASE OF A SEVERE ALLERGIC REACTION    folic acid (Folvite) 1 mg tablet TAKE 1 TABLET BY MOUTH THREE TIMES A WEEK    lisinopril 10 mg, oral, Daily    miscellaneous medical supply (Blood Pressure Cuff) misc 1 kit, miscellaneous, Daily    omeprazole (PriLOSEC) 40 mg DR capsule 1 capsule, oral, Daily      Available Vaccination Record:   Immunization History   Administered Date(s) Administered    Hep A, Unspecified 08/03/2010, 08/04/2012    Meningococcal ACWY-D (Menactra) 4-valent conjugate vaccine 07/29/2009    Moderna SARS-CoV-2 Vaccination 05/02/2021, 05/30/2021, 05/23/2022    Tdap vaccine, age 7 year and older (BOOSTRIX, ADACEL) 07/29/2009    Varicella vaccine, subcutaneous (VARIVAX) 08/03/2010      PHYSICAL EXAM     Vital Signs/Measurements:       6/3/2024     8:58 AM 6/3/2024     9:57 AM 6/3/2024    10:29 AM 7/15/2024    12:42 PM 8/26/2024    12:45 PM 9/23/2024    12:27 PM 10/21/2024    12:08 PM   Vitals   Systolic 127 134 134 144 144 138 137   Diastolic 60 74 63 83 90 77 95   Heart Rate 67 72 73 89 87 79 102   Temp 36.1 °C (97 °F) 36 °C (96.8 °F) 36.1 °C (97 °F) 36.4 °C (97.5 °F) 36.3 °C (97.3 °F) 35.8 °C (96.4 °F) 36.3 °C (97.3 °F)   Resp 16 18 17 16 16 16 16   Weight (lb)    352.07      BMI    45.2 kg/m2      BSA (m2)    2.89 m2         Performance:   ECOG Performance Status: 0     Grade ECOG performance status   0 Fully active, able to carry on all pre-disease performance without  restriction   1 Restricted in physically strenuous activity but ambulatory and able to carry out work of a light or sedentary nature, e.g., light housework, office work   2 Ambulatory and capable of all selfcare but unable to carry out any work activities; Up and about more than 50% of waking hours   3 Capable of only limited selfcare, confined to bed or chair more than 50% of waking hours   4 Completely disabled; Cannot carry out any selfcare; Totally confined to bed or chair   5 Dead     Physical Exam:    General: Patient is pale, awake/alert/oriented, no distress   Psychological: Intact recent and remote memory, judgement, and insight. Appropriate mood, affect, and behavior     RESULTS/DATA     Labs:     Lab Results   Component Value Date    WBC 6.0 10/21/2024    NEUTROABS 4.08 10/21/2024    IGABSOL 0.03 10/21/2024    LYMPHSABS 1.04 (L) 10/21/2024    MONOSABS 0.46 10/21/2024    EOSABS 0.39 10/21/2024    BASOSABS 0.03 10/21/2024    RBC 4.56 10/21/2024    MCV 68 (L) 10/21/2024    MCHC 27.0 (L) 10/21/2024    HGB 8.4 (L) 10/21/2024    HCT 31.1 (L) 10/21/2024     10/21/2024     Lab Results   Component Value Date    RETICCTPCT 1.8 10/21/2024      Lab Results   Component Value Date    CREATININE 1.04 10/21/2024    BUN 9 10/21/2024    EGFR >90 10/21/2024     10/21/2024    K 3.9 10/21/2024     10/21/2024    CO2 24 10/21/2024      Lab Results   Component Value Date    ALT 29 10/21/2024    AST 27 10/21/2024    ALKPHOS 56 10/21/2024    BILITOT 0.8 10/21/2024      Lab Results   Component Value Date    TSH 1.68 02/05/2024     Lab Results   Component Value Date    TSH 1.68 02/05/2024     Lab Results   Component Value Date    IRON 24 (L) 10/21/2024    TIBC 457 (H) 10/21/2024    FERRITIN 8 (L) 10/21/2024      Lab Results   Component Value Date    BIRRCJKM66 558 10/21/2024      Lab Results   Component Value Date    FOLATE 11.0 10/21/2024     Lab Results   Component Value Date    KELLY NEGATIVE 08/03/2021    RF <10  08/03/2021    SEDRATE 10 02/05/2024      Lab Results   Component Value Date    CRP 0.69 02/05/2024      Lab Results   Component Value Date     02/05/2024     Lab Results   Component Value Date    HAPTOGLOBIN 152 02/05/2024     Lab Results   Component Value Date    SPEP NORMAL 08/03/2021        Radiology/Studies:   CT chest abdomen pelvis w IV contrast  12/27/2022  IMPRESSION:  Large hiatal hernia similar to prior.  Hepatomegaly and diffuse fatty infiltration of the liver. Splenomegaly.   Colonoscopy/EGD   10/27/2023  Impression  No impression generated  Findings  Retained solid stool in the rectosigmoid colon  Recommendation    Prior colonoscopy was normal and upper endoscopy found a source of anemia so repeat colonoscopy might not be indicated until age 45 or other indication such as FOBT      Impression  The esophagus appeared normal.  6 cm hiatal hernia with Sachin lesions present  The 1st part of the duodenum and 2nd part of the duodenum appeared normal. Performed random biopsy to rule out celiac disease.  Findings  The esophagus appeared normal.  6 cm hiatal hernia with Sachin lesions present - GE junction 34 cm from the incisors, diaphragmatic impression 40 cm from the incisors  The 1st part of the duodenum and 2nd part of the duodenum appeared normal. Performed random biopsy using biopsy forceps to rule out celiac disease.   Recommendation    Follow up with primary gastroenterologis     ASSESSMENT/PLAN     Assessment and Plan:   #1. Severe  iron deficiency anemia with microcytosis and thrombocytosis   In 6/2021 (6/2021 ferritin less than 8, hemoglobin 4s) with negative stool occult/urine analysis for blood and no visible bleeding, 6/2021 status post 3 units packed red blood cells and started on ferrous sulfate, 6/2021 reticulocyte count low but subsequently elevated possibly due to starting iron, normal CBC 7/2015, no prior history of anemia prior to 6/2021.  6/2021 patient presented with symptoms of  cough, fatigue, near syncope.  No evidence of hemolysis with normal bilirubin.  B12 was only 259 , 6/2021 started on B12 injections from PCP; MCV was low likely  due to the iron deficiency, can see a mixed picture.  8/4/2021 hemoglobin normalized along with MCV, iron still low but hemoglobin normal so he was likely absorbing some and maintained on oral  iron, also diagnosed with folate deficiency and started on supplement.  8/4/2021 normal/negative B1/B6/copper/zinc, LDH, methylmalonic  acid, JAK2, rheumatoid factor, HIV, hepatitis, KELLY, celiac, lead, flow cytometry, SPEP, intrinsic factor antibody, parietal cell antibody. On 10/14/21 EGD and colonoscopy (done with a gluten challenge with negative duodenal biopsy, so he did not appear to have clinical celiac disease) with normal colonoscopy, EGD with LA grade B reflux esophagitis without bleeding, 3 cm hiatal hernia,  multiple Sachin's ulcers, acute gastritis; pathology showed unremarkable duodenal biopsy, stomach with reactive gastropathy but negative H. pylori; therefore, he could have a combination of malabsorption (was started on a PPI 6/2021 but that was after he presented with anemia) as well as blood loss from the Sachin's ulcers.      December 2022 iron studies were persistently  low and he eventually agreed to Feraheme which was given 1/2022, and actually February 2022 iron studies normalized with hemoglobin up at the upper limit of normal.  Gastroenterology recommended no further endoscopy 7/18/2022.     8/2022 he discontinued his oral iron given likely ineffectiveness.  Despite being out of his folate his level was decent 12/2022, subsequently restarted back on his folate.  Iron deficiency again receiving IV iron multiple times December 2022-April 2023 with hemoglobin down to 9.9 and MCV down to 76 in March 2023 consistent with severe iron deficiency, no visible bleeding.    CBC and iron studies improved after IV iron April 2023.  July 2023 hemoglobin  down to 10.1 with clear iron deficiency status post IV iron again, still iron deficient.    September 2023 but he refused IV iron after multiple unsuccessful IV attempts.     On 10/27/2023 Dr. Pfeiffer performed a colonoscopy showing retained stool with prior colonoscopy normal and same-day EGD with source of bleeding so likely not necessary to redo, EGD with 6 cm hiatal hernia with Sachin's lesions, normal-appearing duodenum with biopsy pending; felt iron deficiency was secondary to hiatal hernia with Sachin's erosions, appointment with Dr. Hyatt general surgery 12/4/2023.  Erythropoietin level was elevated 7/2023 likely due to iron deficiency.  Normal B12 and folate most recently September 2023.  NEGATIVE HEPATITIS PANEL 8/2021.     Borderline polycythemia discovered once iron studies collected to 16.8 on 5/2/2022 with no known history of any cardiopulmonary issues or sleep apnea but does have the body habitus predisposing apnea.  Negative myeloid next generation sequencing panel  8/2022 with erythropoietin slightly elevated at 32 likely secondary polycythemia, seen by pulmonary 9/2022 with no evidence of a pulmonary disorder.  Erythropoietin elevated up to 90 December 2022 with CT chest/abdomen/pelvis done to check for malignancy  which was unremarkable except for hepatosplenomegaly.  Normal testosterone 12/2023.  July 2023 anemic with elevated erythropoietin likely due to the iron deficiency.    June 2021, thrombocytosis likely secondary to iron deficiency with most platelet count normal on 6/23/2021.  Negative myeloid next generation sequencing panel 8/2022.  Platelet count normal on most recent labs.     On 2/5/2024 hemoglobin down to 7.3, MCV 70.  Corrected reticulocyte count is 1.7 showing hypoproliferation.  Showing the bone marrow inability to produce red blood cells adequately.  Hemoglobin identification has been drawn and pending along with additional labs to assess for hemolytic anemia.  LDH,  haptoglobin, and bilirubin panels pending. Flow Cytometry in process.  Patient very pale.  Though denies any symptoms other than his normal baseline.  Patient severely iron deficient unable to detect a TIBC or saturation.  Iron 16, UIBC over 450.  Ferritin below 8.  Notably B12 is 364, monthly B12 injections have been ordered.  Folate low at 8.6, please take folic acid 1 mg daily.    3/22/2024 status post 2 Feraheme infusions. Feb 2024.  He will have labs drawn prior to losing his insurance.  He reports losing his insurance.  His insurance is through [employer].  I have reached out to [employer] to assist him referral placed.  Will send him B12 and folic acid.     5/13/2024-status post 2 Feraheme last April 1, 2024.  I have ordered additional IV iron infusions.  Depending on insurance coverage we will modify as needed.  He is going to follow-up with Francisco Chavez CNP for primary care needs.  Colonoscopy October 2023 and EGD 12/20/2023 did not note any bleeding.  He is unsure if he will be able to have his surgery due to lack of insurance benefits.  CT chest abdomen pelvis in December 2022 showed a large hiatal hernia similar to prior exam.  Additionally, showed enlarged liver and spleen. Concern for bleeding. Hiatal hernia is now 6 cm with indira lesions. Please see GI.   11/11/2024 patient remains iron deficient unable to see new gastro surgeon due to schedule.  Requesting referral for consult.  Hiatal hernia remains concern in the setting of iron deficiency anemia.  However, unable to determine until procedures performed.  Also explained to Melvin that we need to keep his iron levels and his hemoglobin at sufficient levels for his body needs and to prevent blood transfusion unnecessarily.  He states understanding and that he has not declined iron fusions.  He will receive as needed.  Explained if iron deficiency persists without explanation he may need a bone marrow biopsy.  Explained Dr. Gaston is here at  [facility] to see him at next visit.    #2. Hepatomegaly and Mild Splenomegaly   Mild splenomegaly on imaging 15.2 cm by CT 6/2021, could be due to possible fatty liver although not mentioned on imaging but does have significant obesity, mild upper abdominal pain seemed unlikely related to the mild splenomegaly especially as it seemed better in 2022.  Negative myeloid next generation sequencing panel 8/2022.   Gastroenterology recommended no further evaluation of this 7/18/2022.      Hepatosplenomegaly on imaging 12/2022.  Dr. Pfeiffer with hepatology saw him 5/19/2023 with labs and FibroScan ordered along with weight loss program, likely NAFLD-he has not done any of this testing yet except for labs January 2023 with persistently elevated transaminases, normal ceruloplasmin and A1 AT, negative hepatitis B core antibody, positive hepatitis A antibody total. Hep A non-reactive 8/2021.   5/13/2024: He has met with bariatric clinic. Unsure of the plan at this time due to losing insurance and the cost of his new plan. An US of abdomen may be warranted if LFTs return. Splenomegaly may be secondary to fatty liver.    11/11/2024 Advised if anemia persists without explanation  he may need a bone marrow.    #3. LFTS elevated  History of abnormal LFTs in [id number] but normal 6/2021.  Abnormal LFTs 7/2023 as above.  Calcium 8.4, LFTs normal on labs 2/5/2024.  5/13/2024 no elevated LFTs noted on most recent labs. Discussed healthy food group consumption to help reduce fatty liver and daily physical activity.   11/11/2024 no elevation in the last 2 readings of LFTs.    I have reviewed the patient's medical record including provider notes, laboratory and testing results, imaging, and procedures available within the system and outside the system.     Follow up:    RTC:  -2-3 months with labs at end on January 2025    Medications:  --B12 oral sent to pharmacy   --folic acid 1 mg daily  --IV iron now x 2 doses Feraheme 510 mg each  dose    Imaging/Testing:  -NA    Referral:  -General Surgery fir Hiatal hernia     Other Pertinent Appointments:  -NA    Patient Discussion Summary:  Discussed the plan of care with the patient.  He is understanding.  Patient needs iron infusions soon as possible due to trending down hemoglobin and malabsorption of oral iron.  Please follow with GI and general surgery, and any additional specialties as scheduled. He states understanding and agreement.  Answered all questions to his liking.  Discussed healthy modifiable lifestyle changes such as healthy diet and types of food to eat, meeting with a dietitian, incorporating daily activity. He will call with any questions or concerns.     Thank you for allowing me to participate in your care.     Sincerely,  Bev Robles, APRN-CNP       This document may have been written by voice recognition software.   Time based billing: Please see documentation within this chart

## 2024-11-18 ENCOUNTER — INFUSION (OUTPATIENT)
Dept: HEMATOLOGY/ONCOLOGY | Facility: HOSPITAL | Age: 29
End: 2024-11-18
Payer: COMMERCIAL

## 2024-11-18 VITALS
HEART RATE: 80 BPM | TEMPERATURE: 97.5 F | RESPIRATION RATE: 16 BRPM | OXYGEN SATURATION: 96 % | DIASTOLIC BLOOD PRESSURE: 69 MMHG | SYSTOLIC BLOOD PRESSURE: 173 MMHG

## 2024-11-18 DIAGNOSIS — Z86.2 H/O IRON DEFICIENCY ANEMIA: ICD-10-CM

## 2024-11-18 DIAGNOSIS — E53.8 B12 DEFICIENCY: ICD-10-CM

## 2024-11-18 DIAGNOSIS — K90.9 IRON MALABSORPTION (HHS-HCC): ICD-10-CM

## 2024-11-18 PROCEDURE — 2500000004 HC RX 250 GENERAL PHARMACY W/ HCPCS (ALT 636 FOR OP/ED)

## 2024-11-18 PROCEDURE — 96365 THER/PROPH/DIAG IV INF INIT: CPT | Mod: INF

## 2024-11-18 PROCEDURE — 96372 THER/PROPH/DIAG INJ SC/IM: CPT | Mod: 59

## 2024-11-18 RX ORDER — FAMOTIDINE 10 MG/ML
20 INJECTION INTRAVENOUS ONCE AS NEEDED
Status: DISCONTINUED | OUTPATIENT
Start: 2024-11-18 | End: 2024-11-18 | Stop reason: HOSPADM

## 2024-11-18 RX ORDER — EPINEPHRINE 0.3 MG/.3ML
0.3 INJECTION SUBCUTANEOUS EVERY 5 MIN PRN
OUTPATIENT
Start: 2024-12-16

## 2024-11-18 RX ORDER — ALBUTEROL SULFATE 0.83 MG/ML
3 SOLUTION RESPIRATORY (INHALATION) AS NEEDED
OUTPATIENT
Start: 2024-11-19

## 2024-11-18 RX ORDER — CYANOCOBALAMIN 1000 UG/ML
1000 INJECTION, SOLUTION INTRAMUSCULAR; SUBCUTANEOUS ONCE
Status: COMPLETED | OUTPATIENT
Start: 2024-11-18 | End: 2024-11-18

## 2024-11-18 RX ORDER — CYANOCOBALAMIN 1000 UG/ML
1000 INJECTION, SOLUTION INTRAMUSCULAR; SUBCUTANEOUS ONCE
OUTPATIENT
Start: 2024-12-16

## 2024-11-18 RX ORDER — EPINEPHRINE 0.3 MG/.3ML
0.3 INJECTION SUBCUTANEOUS EVERY 5 MIN PRN
Status: DISCONTINUED | OUTPATIENT
Start: 2024-11-18 | End: 2024-11-18 | Stop reason: HOSPADM

## 2024-11-18 RX ORDER — FAMOTIDINE 10 MG/ML
20 INJECTION INTRAVENOUS ONCE AS NEEDED
OUTPATIENT
Start: 2024-12-16

## 2024-11-18 RX ORDER — FAMOTIDINE 10 MG/ML
20 INJECTION INTRAVENOUS ONCE AS NEEDED
OUTPATIENT
Start: 2024-11-19

## 2024-11-18 RX ORDER — ALBUTEROL SULFATE 0.83 MG/ML
3 SOLUTION RESPIRATORY (INHALATION) AS NEEDED
OUTPATIENT
Start: 2024-12-16

## 2024-11-18 RX ORDER — DIPHENHYDRAMINE HYDROCHLORIDE 50 MG/ML
50 INJECTION INTRAMUSCULAR; INTRAVENOUS AS NEEDED
Status: DISCONTINUED | OUTPATIENT
Start: 2024-11-18 | End: 2024-11-18 | Stop reason: HOSPADM

## 2024-11-18 RX ORDER — EPINEPHRINE 0.3 MG/.3ML
0.3 INJECTION SUBCUTANEOUS EVERY 5 MIN PRN
OUTPATIENT
Start: 2024-11-19

## 2024-11-18 RX ORDER — ALBUTEROL SULFATE 0.83 MG/ML
3 SOLUTION RESPIRATORY (INHALATION) AS NEEDED
Status: DISCONTINUED | OUTPATIENT
Start: 2024-11-18 | End: 2024-11-18 | Stop reason: HOSPADM

## 2024-11-18 RX ORDER — DIPHENHYDRAMINE HYDROCHLORIDE 50 MG/ML
50 INJECTION INTRAMUSCULAR; INTRAVENOUS AS NEEDED
OUTPATIENT
Start: 2024-11-19

## 2024-11-18 RX ORDER — DIPHENHYDRAMINE HYDROCHLORIDE 50 MG/ML
50 INJECTION INTRAMUSCULAR; INTRAVENOUS AS NEEDED
OUTPATIENT
Start: 2024-12-16

## 2024-11-18 SDOH — ECONOMIC STABILITY: FOOD INSECURITY: WITHIN THE PAST 12 MONTHS, YOU WORRIED THAT YOUR FOOD WOULD RUN OUT BEFORE YOU GOT MONEY TO BUY MORE.: NEVER TRUE

## 2024-11-18 SDOH — ECONOMIC STABILITY: FOOD INSECURITY: WITHIN THE PAST 12 MONTHS, THE FOOD YOU BOUGHT JUST DIDN'T LAST AND YOU DIDN'T HAVE MONEY TO GET MORE.: NEVER TRUE

## 2024-11-18 ASSESSMENT — PAIN SCALES - GENERAL: PAINLEVEL_OUTOF10: 0-NO PAIN

## 2024-11-25 ENCOUNTER — INFUSION (OUTPATIENT)
Dept: HEMATOLOGY/ONCOLOGY | Facility: HOSPITAL | Age: 29
End: 2024-11-25
Payer: COMMERCIAL

## 2024-11-25 ENCOUNTER — APPOINTMENT (OUTPATIENT)
Dept: SURGERY | Facility: CLINIC | Age: 29
End: 2024-11-25
Payer: COMMERCIAL

## 2024-11-25 VITALS
HEART RATE: 72 BPM | SYSTOLIC BLOOD PRESSURE: 156 MMHG | RESPIRATION RATE: 16 BRPM | TEMPERATURE: 96.8 F | DIASTOLIC BLOOD PRESSURE: 75 MMHG | OXYGEN SATURATION: 96 %

## 2024-11-25 VITALS
WEIGHT: 315 LBS | DIASTOLIC BLOOD PRESSURE: 63 MMHG | SYSTOLIC BLOOD PRESSURE: 119 MMHG | TEMPERATURE: 98.5 F | HEART RATE: 62 BPM | BODY MASS INDEX: 40.43 KG/M2 | HEIGHT: 74 IN

## 2024-11-25 DIAGNOSIS — Z86.2 H/O IRON DEFICIENCY ANEMIA: ICD-10-CM

## 2024-11-25 DIAGNOSIS — K25.7: Primary | ICD-10-CM

## 2024-11-25 DIAGNOSIS — D50.9 HYPOCHROMIC MICROCYTIC ANEMIA: ICD-10-CM

## 2024-11-25 DIAGNOSIS — K90.9 IRON MALABSORPTION (HHS-HCC): ICD-10-CM

## 2024-11-25 DIAGNOSIS — K44.9 HIATAL HERNIA: ICD-10-CM

## 2024-11-25 PROCEDURE — 99204 OFFICE O/P NEW MOD 45 MIN: CPT | Performed by: SURGERY

## 2024-11-25 PROCEDURE — 1036F TOBACCO NON-USER: CPT | Performed by: SURGERY

## 2024-11-25 PROCEDURE — 3008F BODY MASS INDEX DOCD: CPT | Performed by: SURGERY

## 2024-11-25 PROCEDURE — 96365 THER/PROPH/DIAG IV INF INIT: CPT | Mod: INF

## 2024-11-25 PROCEDURE — 2500000004 HC RX 250 GENERAL PHARMACY W/ HCPCS (ALT 636 FOR OP/ED)

## 2024-11-25 RX ORDER — SUCRALFATE 1 G/1
1 TABLET ORAL
Qty: 360 TABLET | Refills: 3 | Status: SHIPPED | OUTPATIENT
Start: 2024-11-25 | End: 2025-11-25

## 2024-11-25 RX ORDER — EPINEPHRINE 0.3 MG/.3ML
0.3 INJECTION SUBCUTANEOUS EVERY 5 MIN PRN
OUTPATIENT
Start: 2024-11-26

## 2024-11-25 RX ORDER — DIPHENHYDRAMINE HYDROCHLORIDE 50 MG/ML
50 INJECTION INTRAMUSCULAR; INTRAVENOUS AS NEEDED
OUTPATIENT
Start: 2024-11-26

## 2024-11-25 RX ORDER — FAMOTIDINE 10 MG/ML
20 INJECTION INTRAVENOUS ONCE AS NEEDED
OUTPATIENT
Start: 2024-11-26

## 2024-11-25 RX ORDER — ALBUTEROL SULFATE 0.83 MG/ML
3 SOLUTION RESPIRATORY (INHALATION) AS NEEDED
OUTPATIENT
Start: 2024-11-26

## 2024-11-25 ASSESSMENT — PAIN SCALES - GENERAL: PAINLEVEL_OUTOF10: 0-NO PAIN

## 2024-11-25 ASSESSMENT — ENCOUNTER SYMPTOMS: FATIGUE: 1

## 2024-11-25 NOTE — PROGRESS NOTES
Subjective   Patient ID: Melvin Foley is a 29 y.o. male who presents for evaluation of a hiatal hernia and anemia.    HPI     This is a patient who has had a recent chronic history of microcytic hypochromic anemia.  He had an upper and lower endoscopy performed on 20 2723 which confirmed a 6 cm hiatal hernia with Sachin's lesions.  He also had a negative colonoscopy.  He had a CT scan on 12/26/2022 which confirmed a similar large hiatal hernia and a small periumbilical hernia.  He has been treated with multiple medications for iron deficiency but continues to have hypochromic microcytic anemia.  He was seen by bariatric surgery where recommendation was made for possible bariatric surgery while repairing his hiatal hernia based on his body mass index of 44.    Past Medical History:   Diagnosis Date    B12 deficiency 09/29/2023    GERD (gastroesophageal reflux disease)     Hyperlipidemia, unspecified 06/10/2021    Dyslipidemia    Iron malabsorption ([facility]) 09/19/2023    Personal history of diseases of the blood and blood-forming organs and certain disorders involving the immune mechanism 06/23/2021    History of anemia        Current Outpatient Medications on File Prior to Visit   Medication Sig Dispense Refill    cyanocobalamin (Vitamin B-12) 2,500 mcg tablet TAKE 1 TABLET BY MOUTH THREE TIMES A WEEK 30 tablet 3    EPINEPHrine (EpiPen 2-Adam) 0.3 mg/0.3 mL injection syringe USE AS DIRECTED IN CASE OF A SEVERE ALLERGIC REACTION      folic acid (Folvite) 1 mg tablet TAKE 1 TABLET BY MOUTH THREE TIMES A WEEK 30 tablet 11    lisinopril 10 mg tablet Take 1 tablet (10 mg) by mouth once daily. 30 tablet 11    miscellaneous medical supply (Blood Pressure Cuff) misc 1 kit once daily. 1 each 0    omeprazole (PriLOSEC) 40 mg DR capsule Take 1 capsule (40 mg) by mouth once daily.       Current Facility-Administered Medications on File Prior to Visit   Medication Dose Route Frequency Provider Last Rate Last Admin     [COMPLETED] ferumoxytol (Feraheme) 510 mg in sodium chloride 0.9% 127 mL IV  510 mg intravenous Once Bev Robles, APRN-CNP   Stopped at 11/25/24 1321        Review of Systems   Constitutional:  Positive for fatigue.   All other systems reviewed and are negative.      Vitals:    11/25/24 1429   BP: 119/63   Pulse: 62   Temp: 36.9 °C (98.5 °F)        Objective     Physical Exam  Vitals reviewed.   Constitutional:       Appearance: Normal appearance.   HENT:      Head: Normocephalic.   Cardiovascular:      Rate and Rhythm: Normal rate and regular rhythm.      Heart sounds: Normal heart sounds.   Pulmonary:      Effort: Pulmonary effort is normal.      Breath sounds: Normal breath sounds.   Abdominal:      General: Abdomen is flat. There is no distension.      Palpations: Abdomen is soft. There is no mass.      Tenderness: There is no abdominal tenderness. There is no guarding.      Comments: Stool is heme-negative.   Musculoskeletal:         General: Normal range of motion.   Skin:     General: Skin is warm.   Neurological:      General: No focal deficit present.   Psychiatric:         Mood and Affect: Mood normal.         Problem List Items Addressed This Visit       Hypochromic microcytic anemia - Primary    Sachni ulcer, chronic     Other Visit Diagnoses       Hiatal hernia                 Assessment/Plan   Plan-you have chronic anemia.  You may have a history of Sachin's ulcers as a result of your hiatal hernia.  Your stool is heme-negative and I do not believe that these are causing your anemia.  At this time I would not recommend hernia repair only.  You have already seen a bariatric surgeon and the appropriate procedure would be bariatric procedure which would at the same time take care of your hiatal hernia Prior to this I would recommend oral Carafate which can help with the Sachin's ulcers.  I would also recommend you go ahead and have the capsule endoscopy that was already ordered for you.  As  discussed your stool was heme-negative and you do not appear to be having obvious gastrointestinal bleeding causing anemia.  I will follow-up with you in 8 weeks.   Alpesh Crocker MD

## 2024-11-25 NOTE — PATIENT INSTRUCTIONS
I reviewed all of your information in your chart.  You have a history of an EGD with a hiatal hernia with some Sachin's ulcers.  It is unlikely that the Sachin ulcers are causing the level of anemia you are having.  I would prefer to treat you with oral Carafate for this.  You already seen a bariatric surgeon and to fix your hernia bariatric operation be more appropriate.  At this time I do not believe that you need that.  I would like you to undergo the capsule endoscopy that is being ordered previously.  I will follow-up with you in 8 weeks via virtual appointment.

## 2024-12-02 ENCOUNTER — APPOINTMENT (OUTPATIENT)
Dept: SURGERY | Facility: CLINIC | Age: 29
End: 2024-12-02
Payer: COMMERCIAL

## 2024-12-16 ENCOUNTER — INFUSION (OUTPATIENT)
Dept: HEMATOLOGY/ONCOLOGY | Facility: HOSPITAL | Age: 29
End: 2024-12-16
Payer: COMMERCIAL

## 2024-12-16 VITALS
HEART RATE: 74 BPM | RESPIRATION RATE: 16 BRPM | DIASTOLIC BLOOD PRESSURE: 78 MMHG | SYSTOLIC BLOOD PRESSURE: 161 MMHG | TEMPERATURE: 97 F | OXYGEN SATURATION: 97 %

## 2024-12-16 DIAGNOSIS — E53.8 B12 DEFICIENCY: ICD-10-CM

## 2024-12-16 PROCEDURE — 2500000004 HC RX 250 GENERAL PHARMACY W/ HCPCS (ALT 636 FOR OP/ED)

## 2024-12-16 PROCEDURE — 96372 THER/PROPH/DIAG INJ SC/IM: CPT

## 2024-12-16 RX ORDER — FAMOTIDINE 10 MG/ML
20 INJECTION INTRAVENOUS ONCE AS NEEDED
OUTPATIENT
Start: 2025-01-13

## 2024-12-16 RX ORDER — CYANOCOBALAMIN 1000 UG/ML
1000 INJECTION, SOLUTION INTRAMUSCULAR; SUBCUTANEOUS ONCE
Status: COMPLETED | OUTPATIENT
Start: 2024-12-16 | End: 2024-12-16

## 2024-12-16 RX ORDER — CYANOCOBALAMIN 1000 UG/ML
1000 INJECTION, SOLUTION INTRAMUSCULAR; SUBCUTANEOUS ONCE
OUTPATIENT
Start: 2025-01-13

## 2024-12-16 RX ORDER — ALBUTEROL SULFATE 0.83 MG/ML
3 SOLUTION RESPIRATORY (INHALATION) AS NEEDED
OUTPATIENT
Start: 2025-01-13

## 2024-12-16 RX ORDER — DIPHENHYDRAMINE HYDROCHLORIDE 50 MG/ML
50 INJECTION INTRAMUSCULAR; INTRAVENOUS AS NEEDED
OUTPATIENT
Start: 2025-01-13

## 2024-12-16 RX ORDER — EPINEPHRINE 0.3 MG/.3ML
0.3 INJECTION SUBCUTANEOUS EVERY 5 MIN PRN
OUTPATIENT
Start: 2025-01-13

## 2024-12-16 ASSESSMENT — PAIN SCALES - GENERAL: PAINLEVEL_OUTOF10: 0-NO PAIN

## 2024-12-24 ENCOUNTER — APPOINTMENT (OUTPATIENT)
Dept: GASTROENTEROLOGY | Facility: HOSPITAL | Age: 29
End: 2024-12-24
Payer: COMMERCIAL

## 2025-01-13 ENCOUNTER — INFUSION (OUTPATIENT)
Dept: HEMATOLOGY/ONCOLOGY | Facility: HOSPITAL | Age: 30
End: 2025-01-13
Payer: COMMERCIAL

## 2025-01-13 VITALS
DIASTOLIC BLOOD PRESSURE: 88 MMHG | OXYGEN SATURATION: 98 % | HEART RATE: 91 BPM | RESPIRATION RATE: 16 BRPM | SYSTOLIC BLOOD PRESSURE: 159 MMHG | TEMPERATURE: 97.3 F

## 2025-01-13 DIAGNOSIS — E53.8 B12 DEFICIENCY: ICD-10-CM

## 2025-01-13 PROCEDURE — 2500000004 HC RX 250 GENERAL PHARMACY W/ HCPCS (ALT 636 FOR OP/ED)

## 2025-01-13 PROCEDURE — 96372 THER/PROPH/DIAG INJ SC/IM: CPT

## 2025-01-13 RX ORDER — DIPHENHYDRAMINE HYDROCHLORIDE 50 MG/ML
50 INJECTION INTRAMUSCULAR; INTRAVENOUS AS NEEDED
OUTPATIENT
Start: 2025-02-10

## 2025-01-13 RX ORDER — FAMOTIDINE 10 MG/ML
20 INJECTION INTRAVENOUS ONCE AS NEEDED
OUTPATIENT
Start: 2025-02-10

## 2025-01-13 RX ORDER — ALBUTEROL SULFATE 0.83 MG/ML
3 SOLUTION RESPIRATORY (INHALATION) AS NEEDED
OUTPATIENT
Start: 2025-02-10

## 2025-01-13 RX ORDER — CYANOCOBALAMIN 1000 UG/ML
1000 INJECTION, SOLUTION INTRAMUSCULAR; SUBCUTANEOUS ONCE
OUTPATIENT
Start: 2025-02-10

## 2025-01-13 RX ORDER — EPINEPHRINE 0.3 MG/.3ML
0.3 INJECTION SUBCUTANEOUS EVERY 5 MIN PRN
OUTPATIENT
Start: 2025-02-10

## 2025-01-13 RX ORDER — CYANOCOBALAMIN 1000 UG/ML
1000 INJECTION, SOLUTION INTRAMUSCULAR; SUBCUTANEOUS ONCE
Status: COMPLETED | OUTPATIENT
Start: 2025-01-13 | End: 2025-01-13

## 2025-01-13 RX ADMIN — CYANOCOBALAMIN 1000 MCG: 1000 INJECTION, SOLUTION INTRAMUSCULAR at 12:45

## 2025-01-13 ASSESSMENT — ENCOUNTER SYMPTOMS
DEPRESSION: 0
LOSS OF SENSATION IN FEET: 0
OCCASIONAL FEELINGS OF UNSTEADINESS: 0

## 2025-01-13 ASSESSMENT — LIFESTYLE VARIABLES
AUDIT-C TOTAL SCORE: 0
SKIP TO QUESTIONS 9-10: 1
HOW OFTEN DO YOU HAVE SIX OR MORE DRINKS ON ONE OCCASION: NEVER
HOW MANY STANDARD DRINKS CONTAINING ALCOHOL DO YOU HAVE ON A TYPICAL DAY: PATIENT DOES NOT DRINK
HOW OFTEN DO YOU HAVE A DRINK CONTAINING ALCOHOL: NEVER

## 2025-01-13 ASSESSMENT — PAIN SCALES - GENERAL: PAINLEVEL_OUTOF10: 0-NO PAIN

## 2025-01-17 DIAGNOSIS — E53.8 B12 DEFICIENCY: ICD-10-CM

## 2025-01-17 DIAGNOSIS — K90.9 IRON MALABSORPTION (HHS-HCC): ICD-10-CM

## 2025-01-17 DIAGNOSIS — Z86.2 H/O IRON DEFICIENCY ANEMIA: Primary | ICD-10-CM

## 2025-01-29 ENCOUNTER — APPOINTMENT (OUTPATIENT)
Dept: SURGERY | Facility: CLINIC | Age: 30
End: 2025-01-29
Payer: COMMERCIAL

## 2025-02-07 ENCOUNTER — TELEPHONE (OUTPATIENT)
Dept: HEMATOLOGY/ONCOLOGY | Facility: HOSPITAL | Age: 30
End: 2025-02-07
Payer: COMMERCIAL

## 2025-02-07 NOTE — TELEPHONE ENCOUNTER
Contacted patient, reminded I'm he has lab work ordered prior to his visit with Dr. Gaston on 2/12/25.

## 2025-02-10 ENCOUNTER — INFUSION (OUTPATIENT)
Dept: HEMATOLOGY/ONCOLOGY | Facility: HOSPITAL | Age: 30
End: 2025-02-10
Payer: COMMERCIAL

## 2025-02-10 VITALS
TEMPERATURE: 97.3 F | DIASTOLIC BLOOD PRESSURE: 79 MMHG | OXYGEN SATURATION: 99 % | HEIGHT: 74 IN | HEART RATE: 83 BPM | SYSTOLIC BLOOD PRESSURE: 156 MMHG | BODY MASS INDEX: 44.66 KG/M2 | RESPIRATION RATE: 16 BRPM

## 2025-02-10 DIAGNOSIS — K44.9 HIATAL HERNIA: ICD-10-CM

## 2025-02-10 DIAGNOSIS — K90.9 IRON MALABSORPTION (HHS-HCC): ICD-10-CM

## 2025-02-10 DIAGNOSIS — E53.8 FOLATE DEFICIENCY: ICD-10-CM

## 2025-02-10 DIAGNOSIS — E53.8 B12 DEFICIENCY: ICD-10-CM

## 2025-02-10 LAB
ALBUMIN SERPL BCP-MCNC: 4 G/DL (ref 3.4–5)
ALP SERPL-CCNC: 53 U/L (ref 33–120)
ALT SERPL W P-5'-P-CCNC: 28 U/L (ref 10–52)
ANION GAP SERPL CALC-SCNC: 12 MMOL/L (ref 10–20)
AST SERPL W P-5'-P-CCNC: 28 U/L (ref 9–39)
BASOPHILS # BLD AUTO: 0.04 X10*3/UL (ref 0–0.1)
BASOPHILS NFR BLD AUTO: 0.6 %
BILIRUB SERPL-MCNC: 0.6 MG/DL (ref 0–1.2)
BUN SERPL-MCNC: 8 MG/DL (ref 6–23)
CALCIUM SERPL-MCNC: 8.7 MG/DL (ref 8.6–10.3)
CHLORIDE SERPL-SCNC: 108 MMOL/L (ref 98–107)
CO2 SERPL-SCNC: 23 MMOL/L (ref 21–32)
CREAT SERPL-MCNC: 0.86 MG/DL (ref 0.5–1.3)
EGFRCR SERPLBLD CKD-EPI 2021: >90 ML/MIN/1.73M*2
EOSINOPHIL # BLD AUTO: 0.38 X10*3/UL (ref 0–0.7)
EOSINOPHIL NFR BLD AUTO: 6.1 %
ERYTHROCYTE [DISTWIDTH] IN BLOOD BY AUTOMATED COUNT: 16 % (ref 11.5–14.5)
FERRITIN SERPL-MCNC: 9 NG/ML (ref 20–300)
GLUCOSE SERPL-MCNC: 103 MG/DL (ref 74–99)
HCT VFR BLD AUTO: 28.5 % (ref 41–52)
HGB BLD-MCNC: 8.2 G/DL (ref 13.5–17.5)
IMM GRANULOCYTES # BLD AUTO: 0.02 X10*3/UL (ref 0–0.7)
IMM GRANULOCYTES NFR BLD AUTO: 0.3 % (ref 0–0.9)
IRON SATN MFR SERPL: 3 % (ref 25–45)
IRON SERPL-MCNC: 15 UG/DL (ref 35–150)
LDH SERPL L TO P-CCNC: 174 U/L (ref 84–246)
LYMPHOCYTES # BLD AUTO: 0.99 X10*3/UL (ref 1.2–4.8)
LYMPHOCYTES NFR BLD AUTO: 15.9 %
MCH RBC QN AUTO: 20 PG (ref 26–34)
MCHC RBC AUTO-ENTMCNC: 28.8 G/DL (ref 32–36)
MCV RBC AUTO: 70 FL (ref 80–100)
MONOCYTES # BLD AUTO: 0.51 X10*3/UL (ref 0.1–1)
MONOCYTES NFR BLD AUTO: 8.2 %
NEUTROPHILS # BLD AUTO: 4.3 X10*3/UL (ref 1.2–7.7)
NEUTROPHILS NFR BLD AUTO: 68.9 %
NRBC BLD-RTO: 0 /100 WBCS (ref 0–0)
PLATELET # BLD AUTO: 266 X10*3/UL (ref 150–450)
POTASSIUM SERPL-SCNC: 3.8 MMOL/L (ref 3.5–5.3)
PROT SERPL-MCNC: 6.5 G/DL (ref 6.4–8.2)
RBC # BLD AUTO: 4.09 X10*6/UL (ref 4.5–5.9)
SODIUM SERPL-SCNC: 139 MMOL/L (ref 136–145)
TIBC SERPL-MCNC: 450 UG/DL (ref 240–445)
UIBC SERPL-MCNC: 435 UG/DL (ref 110–370)
WBC # BLD AUTO: 6.2 X10*3/UL (ref 4.4–11.3)

## 2025-02-10 PROCEDURE — 96372 THER/PROPH/DIAG INJ SC/IM: CPT

## 2025-02-10 PROCEDURE — 82607 VITAMIN B-12: CPT | Mod: GENLAB

## 2025-02-10 PROCEDURE — 84075 ASSAY ALKALINE PHOSPHATASE: CPT

## 2025-02-10 PROCEDURE — 82728 ASSAY OF FERRITIN: CPT

## 2025-02-10 PROCEDURE — 85025 COMPLETE CBC W/AUTO DIFF WBC: CPT

## 2025-02-10 PROCEDURE — 36415 COLL VENOUS BLD VENIPUNCTURE: CPT

## 2025-02-10 PROCEDURE — 83540 ASSAY OF IRON: CPT

## 2025-02-10 PROCEDURE — 83615 LACTATE (LD) (LDH) ENZYME: CPT

## 2025-02-10 PROCEDURE — 2500000004 HC RX 250 GENERAL PHARMACY W/ HCPCS (ALT 636 FOR OP/ED)

## 2025-02-10 RX ORDER — ALBUTEROL SULFATE 0.83 MG/ML
3 SOLUTION RESPIRATORY (INHALATION) AS NEEDED
OUTPATIENT
Start: 2025-03-10

## 2025-02-10 RX ORDER — DIPHENHYDRAMINE HYDROCHLORIDE 50 MG/ML
50 INJECTION INTRAMUSCULAR; INTRAVENOUS AS NEEDED
OUTPATIENT
Start: 2025-03-10

## 2025-02-10 RX ORDER — CYANOCOBALAMIN 1000 UG/ML
1000 INJECTION, SOLUTION INTRAMUSCULAR; SUBCUTANEOUS ONCE
OUTPATIENT
Start: 2025-03-10

## 2025-02-10 RX ORDER — CYANOCOBALAMIN 1000 UG/ML
1000 INJECTION, SOLUTION INTRAMUSCULAR; SUBCUTANEOUS ONCE
Status: COMPLETED | OUTPATIENT
Start: 2025-02-10 | End: 2025-02-10

## 2025-02-10 RX ORDER — FAMOTIDINE 10 MG/ML
20 INJECTION INTRAVENOUS ONCE AS NEEDED
OUTPATIENT
Start: 2025-03-10

## 2025-02-10 RX ORDER — EPINEPHRINE 0.3 MG/.3ML
0.3 INJECTION SUBCUTANEOUS EVERY 5 MIN PRN
OUTPATIENT
Start: 2025-03-10

## 2025-02-10 RX ADMIN — CYANOCOBALAMIN 1000 MCG: 1000 INJECTION, SOLUTION INTRAMUSCULAR at 12:38

## 2025-02-10 ASSESSMENT — PAIN SCALES - GENERAL: PAINLEVEL_OUTOF10: 0-NO PAIN

## 2025-02-11 LAB — VIT B12 SERPL-MCNC: 537 PG/ML (ref 211–911)

## 2025-02-12 ENCOUNTER — APPOINTMENT (OUTPATIENT)
Dept: HEMATOLOGY/ONCOLOGY | Facility: HOSPITAL | Age: 30
End: 2025-02-12
Payer: COMMERCIAL

## 2025-02-12 ENCOUNTER — TELEMEDICINE (OUTPATIENT)
Dept: HEMATOLOGY/ONCOLOGY | Facility: HOSPITAL | Age: 30
End: 2025-02-12
Payer: COMMERCIAL

## 2025-02-12 ENCOUNTER — TELEPHONE (OUTPATIENT)
Dept: HEMATOLOGY/ONCOLOGY | Facility: HOSPITAL | Age: 30
End: 2025-02-12

## 2025-02-12 DIAGNOSIS — K44.9 HIATAL HERNIA: ICD-10-CM

## 2025-02-12 DIAGNOSIS — E53.8 B12 DEFICIENCY: ICD-10-CM

## 2025-02-12 DIAGNOSIS — K90.9 IRON MALABSORPTION (HHS-HCC): Primary | ICD-10-CM

## 2025-02-12 DIAGNOSIS — E53.8 FOLATE DEFICIENCY: ICD-10-CM

## 2025-02-12 DIAGNOSIS — Z86.2 H/O IRON DEFICIENCY ANEMIA: ICD-10-CM

## 2025-02-12 PROCEDURE — 99215 OFFICE O/P EST HI 40 MIN: CPT | Performed by: INTERNAL MEDICINE

## 2025-02-12 RX ORDER — PANTOPRAZOLE SODIUM 40 MG/1
40 TABLET, DELAYED RELEASE ORAL DAILY
Qty: 60 TABLET | Refills: 5 | Status: SHIPPED | OUTPATIENT
Start: 2025-02-12 | End: 2026-02-12

## 2025-02-12 RX ORDER — SUCRALFATE 1 G/1
1 TABLET ORAL
Qty: 120 TABLET | Refills: 11 | Status: SHIPPED | OUTPATIENT
Start: 2025-02-12 | End: 2026-02-12

## 2025-02-12 RX ORDER — DIPHENHYDRAMINE HYDROCHLORIDE 50 MG/ML
50 INJECTION INTRAMUSCULAR; INTRAVENOUS AS NEEDED
OUTPATIENT
Start: 2025-02-19

## 2025-02-12 RX ORDER — EPINEPHRINE 0.3 MG/.3ML
0.3 INJECTION SUBCUTANEOUS EVERY 5 MIN PRN
OUTPATIENT
Start: 2025-02-19

## 2025-02-12 RX ORDER — ALBUTEROL SULFATE 0.83 MG/ML
3 SOLUTION RESPIRATORY (INHALATION) AS NEEDED
OUTPATIENT
Start: 2025-02-19

## 2025-02-12 RX ORDER — FAMOTIDINE 10 MG/ML
20 INJECTION INTRAVENOUS ONCE AS NEEDED
OUTPATIENT
Start: 2025-02-19

## 2025-02-12 ASSESSMENT — ENCOUNTER SYMPTOMS
GASTROINTESTINAL NEGATIVE: 1
RESPIRATORY NEGATIVE: 1
CARDIOVASCULAR NEGATIVE: 1
CONSTITUTIONAL NEGATIVE: 1

## 2025-02-12 NOTE — TELEPHONE ENCOUNTER
Attempted to reach out to patient regarding his need for Iron and a follow up visit with Dr. Gomez. Left message for patient with call back number to call at his earliest convenience.

## 2025-02-12 NOTE — PROGRESS NOTES
Patient ID: Melvin Foley is a 29 y.o. male.    Subjective:  Telemed visit for iron deficiency anemia.   He says he feels fine. He had received iron infusions many times over the last three times but says he had never felt bad. Denies dyspnea or pica. Denies worsening fatigue.   Does not take any stomach medicines.     Assessment/Plan:  ? Irond eficiency anemia: He has been receiving IV iron frequnetly since 2022. Did not have any surgeries. EGD had shown hiatal hernia and Sachin ulcers many times. Colonoscopy have been negative. Capsule endoscopy was not done.   In 2024, he received: Feraheme x2 (Feb 2024), x2 (May 2024), and x2 (Nov 2024). His iron indices are still very low with HB of 8s => Will again give him 2 more doses of feraheme.     He needs to have his very likely GI Bleeding controlled. Not on anti-coagulation. Not on any stomach medicines either => I prescribed him pantoprazole and sucralfate.     Will repeat his labs in 3 months again.     Review Of Systems:  Review of Systems   Constitutional: Negative.    HENT:  Negative.     Respiratory: Negative.     Cardiovascular: Negative.    Gastrointestinal: Negative.    Genitourinary: Negative.         Physical Exam:  There were no vitals taken for this visit.  BSA: There is no height or weight on file to calculate BSA.  Performance Status: Asymptomatic  Physical Exam  Constitutional:       Comments: Telemed visit. Exam not done         Results:  Diagnostic Results   Lab Results   Component Value Date    WBC 6.2 02/10/2025    HGB 8.2 (L) 02/10/2025    HCT 28.5 (L) 02/10/2025    MCV 70 (L) 02/10/2025     02/10/2025     Lab Results   Component Value Date    CALCIUM 8.7 02/10/2025     02/10/2025    K 3.8 02/10/2025    CO2 23 02/10/2025     (H) 02/10/2025    BUN 8 02/10/2025    CREATININE 0.86 02/10/2025    ALT 28 02/10/2025    AST 28 02/10/2025       Current Outpatient Medications:     cyanocobalamin (Vitamin B-12) 2,500 mcg tablet, TAKE 1  TABLET BY MOUTH THREE TIMES A WEEK, Disp: 30 tablet, Rfl: 3    EPINEPHrine (EpiPen 2-Adam) 0.3 mg/0.3 mL injection syringe, USE AS DIRECTED IN CASE OF A SEVERE ALLERGIC REACTION, Disp: , Rfl:     folic acid (Folvite) 1 mg tablet, TAKE 1 TABLET BY MOUTH THREE TIMES A WEEK, Disp: 30 tablet, Rfl: 11    lisinopril 10 mg tablet, Take 1 tablet (10 mg) by mouth once daily., Disp: 30 tablet, Rfl: 11    miscellaneous medical supply (Blood Pressure Cuff) misc, 1 kit once daily., Disp: 1 each, Rfl: 0    omeprazole (PriLOSEC) 40 mg DR capsule, Take 1 capsule (40 mg) by mouth once daily., Disp: , Rfl:     pantoprazole (ProtoNix) 40 mg EC tablet, Take 1 tablet (40 mg) by mouth once daily. Do not crush, chew, or split., Disp: 60 tablet, Rfl: 5    sucralfate (Carafate) 1 gram tablet, Take 1 tablet (1 g) by mouth 4 times a day before meals., Disp: 120 tablet, Rfl: 11     Past Surgical History:   Procedure Laterality Date    COLONOSCOPY      DENTAL SURGERY      OTHER SURGICAL HISTORY  06/10/2021    Knee surgery    UPPER GASTROINTESTINAL ENDOSCOPY       Family History   Problem Relation Name Age of Onset    Arthritis Mother      Depression Mother      Hypertension Father        reports that he has never smoked. He has never been exposed to tobacco smoke. He has never used smokeless tobacco.    Diagnoses and all orders for this visit:  Iron malabsorption ([facility])  -     Clinic Appointment Request  -     pantoprazole (ProtoNix) 40 mg EC tablet; Take 1 tablet (40 mg) by mouth once daily. Do not crush, chew, or split.  -     sucralfate (Carafate) 1 gram tablet; Take 1 tablet (1 g) by mouth 4 times a day before meals.  -     Clinic Appointment Request; Future  -     CBC and Auto Differential; Future  -     Comprehensive Metabolic Panel; Future  -     Ferritin; Future  -     Iron and TIBC; Future  B12 deficiency  -     Clinic Appointment Request  -     pantoprazole (ProtoNix) 40 mg EC tablet; Take 1 tablet (40 mg) by mouth once daily. Do  not crush, chew, or split.  -     sucralfate (Carafate) 1 gram tablet; Take 1 tablet (1 g) by mouth 4 times a day before meals.  -     Clinic Appointment Request; Future  -     CBC and Auto Differential; Future  -     Comprehensive Metabolic Panel; Future  -     Ferritin; Future  -     Iron and TIBC; Future  Folate deficiency  -     Clinic Appointment Request  Hiatal hernia  -     Clinic Appointment Request  H/O iron deficiency anemia  Other orders  -     ferumoxytol (Feraheme) 510 mg in sodium chloride 0.9% 117 mL IV  -     sodium chloride 0.9 % bolus 500 mL  -     dextrose 5 % in water (D5W) bolus 500 mL  -     diphenhydrAMINE (BENADryl) injection 50 mg  -     methylPREDNISolone sod succinate (SOLU-Medrol) 40 mg/mL injection 40 mg  -     famotidine PF (Pepcid) injection 20 mg  -     EPINEPHrine (Epipen) injection syringe 0.3 mg  -     albuterol 2.5 mg /3 mL (0.083 %) nebulizer solution 3 mL       Nichol Gomez MD

## 2025-03-03 ENCOUNTER — INFUSION (OUTPATIENT)
Dept: HEMATOLOGY/ONCOLOGY | Facility: HOSPITAL | Age: 30
End: 2025-03-03
Payer: COMMERCIAL

## 2025-03-03 VITALS
RESPIRATION RATE: 16 BRPM | DIASTOLIC BLOOD PRESSURE: 73 MMHG | HEART RATE: 83 BPM | TEMPERATURE: 97.7 F | SYSTOLIC BLOOD PRESSURE: 137 MMHG | OXYGEN SATURATION: 98 %

## 2025-03-03 DIAGNOSIS — E53.8 B12 DEFICIENCY: ICD-10-CM

## 2025-03-03 DIAGNOSIS — Z86.2 H/O IRON DEFICIENCY ANEMIA: ICD-10-CM

## 2025-03-03 DIAGNOSIS — K90.9 IRON MALABSORPTION (HHS-HCC): ICD-10-CM

## 2025-03-03 PROCEDURE — 96365 THER/PROPH/DIAG IV INF INIT: CPT | Mod: INF

## 2025-03-03 PROCEDURE — 2500000004 HC RX 250 GENERAL PHARMACY W/ HCPCS (ALT 636 FOR OP/ED): Mod: JZ | Performed by: INTERNAL MEDICINE

## 2025-03-03 RX ORDER — ALBUTEROL SULFATE 0.83 MG/ML
3 SOLUTION RESPIRATORY (INHALATION) AS NEEDED
OUTPATIENT
Start: 2025-03-10

## 2025-03-03 RX ORDER — EPINEPHRINE 0.3 MG/.3ML
0.3 INJECTION SUBCUTANEOUS EVERY 5 MIN PRN
OUTPATIENT
Start: 2025-03-10

## 2025-03-03 RX ORDER — HEPARIN SODIUM,PORCINE/PF 10 UNIT/ML
50 SYRINGE (ML) INTRAVENOUS AS NEEDED
OUTPATIENT
Start: 2025-03-03

## 2025-03-03 RX ORDER — FAMOTIDINE 10 MG/ML
20 INJECTION, SOLUTION INTRAVENOUS ONCE AS NEEDED
OUTPATIENT
Start: 2025-03-10

## 2025-03-03 RX ORDER — HEPARIN 100 UNIT/ML
500 SYRINGE INTRAVENOUS AS NEEDED
OUTPATIENT
Start: 2025-03-03

## 2025-03-03 RX ORDER — DIPHENHYDRAMINE HYDROCHLORIDE 50 MG/ML
50 INJECTION INTRAMUSCULAR; INTRAVENOUS AS NEEDED
OUTPATIENT
Start: 2025-03-10

## 2025-03-03 RX ADMIN — FERUMOXYTOL 510 MG: 510 INJECTION INTRAVENOUS at 12:51

## 2025-03-03 ASSESSMENT — PAIN SCALES - GENERAL: PAINLEVEL_OUTOF10: 0-NO PAIN

## 2025-03-10 ENCOUNTER — INFUSION (OUTPATIENT)
Dept: HEMATOLOGY/ONCOLOGY | Facility: HOSPITAL | Age: 30
End: 2025-03-10
Payer: COMMERCIAL

## 2025-03-10 VITALS
SYSTOLIC BLOOD PRESSURE: 115 MMHG | TEMPERATURE: 97.7 F | DIASTOLIC BLOOD PRESSURE: 65 MMHG | RESPIRATION RATE: 16 BRPM | OXYGEN SATURATION: 97 % | HEART RATE: 79 BPM

## 2025-03-10 DIAGNOSIS — E53.8 B12 DEFICIENCY: ICD-10-CM

## 2025-03-10 DIAGNOSIS — Z86.2 H/O IRON DEFICIENCY ANEMIA: ICD-10-CM

## 2025-03-10 DIAGNOSIS — K90.9 IRON MALABSORPTION (HHS-HCC): ICD-10-CM

## 2025-03-10 PROCEDURE — 2500000004 HC RX 250 GENERAL PHARMACY W/ HCPCS (ALT 636 FOR OP/ED): Mod: JZ | Performed by: INTERNAL MEDICINE

## 2025-03-10 PROCEDURE — 96365 THER/PROPH/DIAG IV INF INIT: CPT | Mod: INF

## 2025-03-10 PROCEDURE — 2500000004 HC RX 250 GENERAL PHARMACY W/ HCPCS (ALT 636 FOR OP/ED)

## 2025-03-10 PROCEDURE — 96372 THER/PROPH/DIAG INJ SC/IM: CPT | Mod: 59

## 2025-03-10 RX ORDER — FAMOTIDINE 10 MG/ML
20 INJECTION, SOLUTION INTRAVENOUS ONCE AS NEEDED
OUTPATIENT
Start: 2025-03-10

## 2025-03-10 RX ORDER — ALBUTEROL SULFATE 0.83 MG/ML
3 SOLUTION RESPIRATORY (INHALATION) AS NEEDED
OUTPATIENT
Start: 2025-03-10

## 2025-03-10 RX ORDER — CYANOCOBALAMIN 1000 UG/ML
1000 INJECTION, SOLUTION INTRAMUSCULAR; SUBCUTANEOUS ONCE
OUTPATIENT
Start: 2025-04-07

## 2025-03-10 RX ORDER — DIPHENHYDRAMINE HYDROCHLORIDE 50 MG/ML
50 INJECTION INTRAMUSCULAR; INTRAVENOUS AS NEEDED
OUTPATIENT
Start: 2025-04-07

## 2025-03-10 RX ORDER — FAMOTIDINE 10 MG/ML
20 INJECTION, SOLUTION INTRAVENOUS ONCE AS NEEDED
OUTPATIENT
Start: 2025-04-07

## 2025-03-10 RX ORDER — EPINEPHRINE 0.3 MG/.3ML
0.3 INJECTION SUBCUTANEOUS EVERY 5 MIN PRN
OUTPATIENT
Start: 2025-03-10

## 2025-03-10 RX ORDER — HEPARIN 100 UNIT/ML
500 SYRINGE INTRAVENOUS AS NEEDED
OUTPATIENT
Start: 2025-03-10

## 2025-03-10 RX ORDER — ALBUTEROL SULFATE 0.83 MG/ML
3 SOLUTION RESPIRATORY (INHALATION) AS NEEDED
OUTPATIENT
Start: 2025-04-07

## 2025-03-10 RX ORDER — CYANOCOBALAMIN 1000 UG/ML
1000 INJECTION, SOLUTION INTRAMUSCULAR; SUBCUTANEOUS ONCE
Status: COMPLETED | OUTPATIENT
Start: 2025-03-10 | End: 2025-03-10

## 2025-03-10 RX ORDER — HEPARIN SODIUM,PORCINE/PF 10 UNIT/ML
50 SYRINGE (ML) INTRAVENOUS AS NEEDED
OUTPATIENT
Start: 2025-03-10

## 2025-03-10 RX ORDER — DIPHENHYDRAMINE HYDROCHLORIDE 50 MG/ML
50 INJECTION INTRAMUSCULAR; INTRAVENOUS AS NEEDED
OUTPATIENT
Start: 2025-03-10

## 2025-03-10 RX ORDER — EPINEPHRINE 0.3 MG/.3ML
0.3 INJECTION SUBCUTANEOUS EVERY 5 MIN PRN
OUTPATIENT
Start: 2025-04-07

## 2025-03-10 RX ADMIN — CYANOCOBALAMIN 1000 MCG: 1000 INJECTION, SOLUTION INTRAMUSCULAR at 12:51

## 2025-03-10 RX ADMIN — FERUMOXYTOL 510 MG: 510 INJECTION INTRAVENOUS at 12:35

## 2025-03-10 ASSESSMENT — PAIN SCALES - GENERAL: PAINLEVEL_OUTOF10: 0-NO PAIN

## 2025-05-12 DIAGNOSIS — K90.9 IRON MALABSORPTION (HHS-HCC): ICD-10-CM

## 2025-05-12 DIAGNOSIS — E53.8 B12 DEFICIENCY: ICD-10-CM

## 2025-05-15 ENCOUNTER — APPOINTMENT (OUTPATIENT)
Dept: HEMATOLOGY/ONCOLOGY | Facility: HOSPITAL | Age: 30
End: 2025-05-15
Payer: COMMERCIAL

## 2025-05-27 ENCOUNTER — LAB (OUTPATIENT)
Dept: LAB | Facility: HOSPITAL | Age: 30
End: 2025-05-27
Payer: COMMERCIAL

## 2025-05-27 ENCOUNTER — OFFICE VISIT (OUTPATIENT)
Dept: HEMATOLOGY/ONCOLOGY | Facility: HOSPITAL | Age: 30
End: 2025-05-27
Payer: COMMERCIAL

## 2025-05-27 VITALS
HEART RATE: 87 BPM | TEMPERATURE: 97.5 F | SYSTOLIC BLOOD PRESSURE: 146 MMHG | RESPIRATION RATE: 16 BRPM | HEIGHT: 74 IN | OXYGEN SATURATION: 97 % | WEIGHT: 315 LBS | BODY MASS INDEX: 40.43 KG/M2 | DIASTOLIC BLOOD PRESSURE: 82 MMHG

## 2025-05-27 DIAGNOSIS — K90.9 IRON MALABSORPTION (HHS-HCC): ICD-10-CM

## 2025-05-27 DIAGNOSIS — Z86.2 H/O IRON DEFICIENCY ANEMIA: ICD-10-CM

## 2025-05-27 DIAGNOSIS — K90.9 IRON MALABSORPTION (HHS-HCC): Primary | ICD-10-CM

## 2025-05-27 DIAGNOSIS — E53.8 B12 DEFICIENCY: ICD-10-CM

## 2025-05-27 LAB
ALBUMIN SERPL BCP-MCNC: 4.1 G/DL (ref 3.4–5)
ALP SERPL-CCNC: 65 U/L (ref 33–120)
ALT SERPL W P-5'-P-CCNC: 26 U/L (ref 10–52)
ANION GAP SERPL CALC-SCNC: 10 MMOL/L (ref 10–20)
AST SERPL W P-5'-P-CCNC: 21 U/L (ref 9–39)
BASOPHILS # BLD AUTO: 0.06 X10*3/UL (ref 0–0.1)
BASOPHILS NFR BLD AUTO: 0.9 %
BILIRUB SERPL-MCNC: 0.5 MG/DL (ref 0–1.2)
BUN SERPL-MCNC: 8 MG/DL (ref 6–23)
CALCIUM SERPL-MCNC: 8.8 MG/DL (ref 8.6–10.3)
CHLORIDE SERPL-SCNC: 108 MMOL/L (ref 98–107)
CO2 SERPL-SCNC: 24 MMOL/L (ref 21–32)
CREAT SERPL-MCNC: 0.91 MG/DL (ref 0.5–1.3)
EGFRCR SERPLBLD CKD-EPI 2021: >90 ML/MIN/1.73M*2
EOSINOPHIL # BLD AUTO: 0.51 X10*3/UL (ref 0–0.7)
EOSINOPHIL NFR BLD AUTO: 7.4 %
ERYTHROCYTE [DISTWIDTH] IN BLOOD BY AUTOMATED COUNT: 16 % (ref 11.5–14.5)
FERRITIN SERPL-MCNC: 9 NG/ML (ref 20–300)
GLUCOSE SERPL-MCNC: 107 MG/DL (ref 74–99)
HCT VFR BLD AUTO: 34.1 % (ref 41–52)
HGB BLD-MCNC: 9.8 G/DL (ref 13.5–17.5)
IMM GRANULOCYTES # BLD AUTO: 0.01 X10*3/UL (ref 0–0.7)
IMM GRANULOCYTES NFR BLD AUTO: 0.1 % (ref 0–0.9)
IRON SATN MFR SERPL: ABNORMAL %
IRON SERPL-MCNC: 16 UG/DL (ref 35–150)
LYMPHOCYTES # BLD AUTO: 1.2 X10*3/UL (ref 1.2–4.8)
LYMPHOCYTES NFR BLD AUTO: 17.5 %
MCH RBC QN AUTO: 19.7 PG (ref 26–34)
MCHC RBC AUTO-ENTMCNC: 28.7 G/DL (ref 32–36)
MCV RBC AUTO: 69 FL (ref 80–100)
MONOCYTES # BLD AUTO: 0.42 X10*3/UL (ref 0.1–1)
MONOCYTES NFR BLD AUTO: 6.1 %
NEUTROPHILS # BLD AUTO: 4.65 X10*3/UL (ref 1.2–7.7)
NEUTROPHILS NFR BLD AUTO: 68 %
NRBC BLD-RTO: 0 /100 WBCS (ref 0–0)
PLATELET # BLD AUTO: 291 X10*3/UL (ref 150–450)
POTASSIUM SERPL-SCNC: 3.9 MMOL/L (ref 3.5–5.3)
PROT SERPL-MCNC: 6.9 G/DL (ref 6.4–8.2)
RBC # BLD AUTO: 4.97 X10*6/UL (ref 4.5–5.9)
SODIUM SERPL-SCNC: 138 MMOL/L (ref 136–145)
TIBC SERPL-MCNC: ABNORMAL UG/DL
UIBC SERPL-MCNC: >450 UG/DL (ref 110–370)
WBC # BLD AUTO: 6.9 X10*3/UL (ref 4.4–11.3)

## 2025-05-27 PROCEDURE — 85025 COMPLETE CBC W/AUTO DIFF WBC: CPT

## 2025-05-27 PROCEDURE — 83550 IRON BINDING TEST: CPT

## 2025-05-27 PROCEDURE — 82728 ASSAY OF FERRITIN: CPT

## 2025-05-27 PROCEDURE — 83540 ASSAY OF IRON: CPT

## 2025-05-27 PROCEDURE — 99214 OFFICE O/P EST MOD 30 MIN: CPT | Performed by: INTERNAL MEDICINE

## 2025-05-27 PROCEDURE — 80053 COMPREHEN METABOLIC PANEL: CPT

## 2025-05-27 PROCEDURE — 82607 VITAMIN B-12: CPT

## 2025-05-27 PROCEDURE — 3008F BODY MASS INDEX DOCD: CPT | Performed by: INTERNAL MEDICINE

## 2025-05-27 RX ORDER — FERROUS SULFATE 325(65) MG
325 TABLET ORAL
Qty: 90 TABLET | Refills: 1 | Status: SHIPPED | OUTPATIENT
Start: 2025-05-27 | End: 2025-11-23

## 2025-05-27 RX ORDER — PANTOPRAZOLE SODIUM 40 MG/1
40 TABLET, DELAYED RELEASE ORAL DAILY
Qty: 60 TABLET | Refills: 2 | Status: SHIPPED | OUTPATIENT
Start: 2025-05-27 | End: 2025-11-23

## 2025-05-27 ASSESSMENT — ENCOUNTER SYMPTOMS
CARDIOVASCULAR NEGATIVE: 1
CONSTITUTIONAL NEGATIVE: 1
GASTROINTESTINAL NEGATIVE: 1
RESPIRATORY NEGATIVE: 1

## 2025-05-27 ASSESSMENT — PAIN SCALES - GENERAL: PAINLEVEL_OUTOF10: 0-NO PAIN

## 2025-05-27 NOTE — PROGRESS NOTES
"Patient ID: Melvin Foley is a 29 y.o. male.    Subjective:  Telemed visit for iron deficiency anemia.   He says he feels fine. He had received iron infusions many times over the last three times but says he had never felt bad.   Denies dyspnea or pica. Denies worsening fatigue. Still does not take any stomach medicines.     Assessment/Plan:  ? Irond eficiency anemia: He has been receiving IV iron frequnetly since 2022. Did not have any surgeries. EGD had shown hiatal hernia and Sachin ulcers many times. Colonoscopy have been negative. Capsule endoscopy was not done.   Recent iron infusions: Feraheme x2 (Feb 2024), x2 (May 2024), and x2 (Nov 2024), x2 (Mar 2025).    He needs to have his very likely GI Bleeding controlled. Not on anti-coagulation. Not on any stomach medicines either => I prescribed him pantoprazole again. He had not taken it before.     Will repeat labs today. If low on iron,. Will give him feraheme again. Also, will start him on PO iron daily.   Check iron in 3 and 6 mos again.     ADDENDUM: Iron and H/H still very low => Will give him feraheme 510 mg x 3 this time.    Review Of Systems:  Review of Systems   Constitutional: Negative.    HENT:  Negative.     Respiratory: Negative.     Cardiovascular: Negative.    Gastrointestinal: Negative.    Genitourinary: Negative.         Physical Exam:  /82 (BP Location: Right arm, Patient Position: Sitting, BP Cuff Size: Adult long)   Pulse 87   Temp 36.4 °C (97.5 °F) (Temporal)   Resp 16   Ht 1.88 m (6' 2\")   Wt (!) 152 kg (334 lb 3.5 oz)   SpO2 97%   BMI 42.91 kg/m²   BSA: 2.82 meters squared  Performance Status: Asymptomatic  Physical Exam  Constitutional:       Appearance: He is obese.   HENT:      Head: Normocephalic and atraumatic.   Eyes:      General: No scleral icterus.  Pulmonary:      Effort: Pulmonary effort is normal.   Musculoskeletal:         General: Normal range of motion.   Skin:     Coloration: Skin is not jaundiced. "   Neurological:      General: No focal deficit present.      Mental Status: He is alert and oriented to person, place, and time.         Results:  Diagnostic Results   Lab Results   Component Value Date    WBC 6.2 02/10/2025    HGB 8.2 (L) 02/10/2025    HCT 28.5 (L) 02/10/2025    MCV 70 (L) 02/10/2025     02/10/2025     Lab Results   Component Value Date    CALCIUM 8.7 02/10/2025     02/10/2025    K 3.8 02/10/2025    CO2 23 02/10/2025     (H) 02/10/2025    BUN 8 02/10/2025    CREATININE 0.86 02/10/2025    ALT 28 02/10/2025    AST 28 02/10/2025       Current Outpatient Medications:     EPINEPHrine (EpiPen 2-Adam) 0.3 mg/0.3 mL injection syringe, USE AS DIRECTED IN CASE OF A SEVERE ALLERGIC REACTION, Disp: , Rfl:     miscellaneous medical supply (Blood Pressure Cuff) misc, 1 kit once daily., Disp: 1 each, Rfl: 0    cyanocobalamin (Vitamin B-12) 2,500 mcg tablet, TAKE 1 TABLET BY MOUTH THREE TIMES A WEEK (Patient not taking: Reported on 5/27/2025), Disp: 30 tablet, Rfl: 3    ferrous sulfate 325 mg (65 mg elemental) tablet, Take 1 tablet by mouth once daily with breakfast., Disp: 90 tablet, Rfl: 1    folic acid (Folvite) 1 mg tablet, TAKE 1 TABLET BY MOUTH THREE TIMES A WEEK (Patient not taking: Reported on 5/27/2025), Disp: 30 tablet, Rfl: 11    lisinopril 10 mg tablet, Take 1 tablet (10 mg) by mouth once daily., Disp: 30 tablet, Rfl: 11    omeprazole (PriLOSEC) 40 mg DR capsule, Take 1 capsule (40 mg) by mouth once daily. (Patient not taking: Reported on 5/27/2025), Disp: , Rfl:     pantoprazole (ProtoNix) 40 mg EC tablet, Take 1 tablet (40 mg) by mouth once daily. Do not crush, chew, or split., Disp: 60 tablet, Rfl: 2    sucralfate (Carafate) 1 gram tablet, Take 1 tablet (1 g) by mouth 4 times a day before meals. (Patient not taking: Reported on 5/27/2025), Disp: 120 tablet, Rfl: 11     Past Surgical History:   Procedure Laterality Date    COLONOSCOPY      DENTAL SURGERY      OTHER SURGICAL HISTORY   06/10/2021    Knee surgery    UPPER GASTROINTESTINAL ENDOSCOPY       Family History   Problem Relation Name Age of Onset    Arthritis Mother      Depression Mother      Hypertension Father        reports that he has never smoked. He has never been exposed to tobacco smoke. He has never used smokeless tobacco.    Diagnoses and all orders for this visit:  Iron malabsorption ([facility])  -     Clinic Appointment Request  -     CBC and Auto Differential; Future  -     Comprehensive Metabolic Panel; Future  -     Vitamin B12; Future  -     Ferritin; Future  -     Iron and TIBC; Future  -     CBC and Auto Differential; Future  -     Ferritin; Future  -     Iron and TIBC; Future  -     Clinic Appointment Request; Future  -     CBC and Auto Differential; Future  -     Comprehensive Metabolic Panel; Future  -     Ferritin; Future  -     Iron and TIBC; Future  -     Vitamin B12; Future  -     ferrous sulfate 325 mg (65 mg elemental) tablet; Take 1 tablet by mouth once daily with breakfast.  -     pantoprazole (ProtoNix) 40 mg EC tablet; Take 1 tablet (40 mg) by mouth once daily. Do not crush, chew, or split.  B12 deficiency  -     Clinic Appointment Request  -     CBC and Auto Differential; Future  -     Comprehensive Metabolic Panel; Future  -     Vitamin B12; Future  -     Ferritin; Future  -     Iron and TIBC; Future  -     CBC and Auto Differential; Future  -     Ferritin; Future  -     Iron and TIBC; Future  -     Clinic Appointment Request; Future  -     CBC and Auto Differential; Future  -     Comprehensive Metabolic Panel; Future  -     Ferritin; Future  -     Iron and TIBC; Future  -     Vitamin B12; Future  -     ferrous sulfate 325 mg (65 mg elemental) tablet; Take 1 tablet by mouth once daily with breakfast.  -     pantoprazole (ProtoNix) 40 mg EC tablet; Take 1 tablet (40 mg) by mouth once daily. Do not crush, chew, or split.       Nichol Gomez MD

## 2025-05-28 ENCOUNTER — TELEPHONE (OUTPATIENT)
Dept: HEMATOLOGY/ONCOLOGY | Facility: HOSPITAL | Age: 30
End: 2025-05-28
Payer: COMMERCIAL

## 2025-05-28 LAB — VIT B12 SERPL-MCNC: 469 PG/ML (ref 211–911)

## 2025-05-28 RX ORDER — EPINEPHRINE 0.3 MG/.3ML
0.3 INJECTION SUBCUTANEOUS EVERY 5 MIN PRN
OUTPATIENT
Start: 2025-06-05

## 2025-05-28 RX ORDER — DIPHENHYDRAMINE HYDROCHLORIDE 50 MG/ML
50 INJECTION, SOLUTION INTRAMUSCULAR; INTRAVENOUS AS NEEDED
OUTPATIENT
Start: 2025-06-05

## 2025-05-28 RX ORDER — FAMOTIDINE 10 MG/ML
20 INJECTION, SOLUTION INTRAVENOUS ONCE AS NEEDED
OUTPATIENT
Start: 2025-06-05

## 2025-05-28 RX ORDER — ALBUTEROL SULFATE 0.83 MG/ML
3 SOLUTION RESPIRATORY (INHALATION) AS NEEDED
OUTPATIENT
Start: 2025-06-05

## 2025-05-28 NOTE — TELEPHONE ENCOUNTER
----- Message from Aliya MAURO sent at 5/28/2025  2:13 PM EDT -----  Patient is scheduled for 3 doses of Feraheme starting Monday, 6/9/25. Thank you!  ----- Message -----  From: Nichol Gomez MD  Sent: 5/28/2025   2:06 PM EDT  To: Zuri Schwartz RN; Aliya Mendosa MA    Iron very low again. Needs feraheme x 3 doses  D  ----- Message -----  From: Lab, Background User  Sent: 5/27/2025   3:10 PM EDT  To: Nichol Gomez MD

## 2025-05-28 NOTE — TELEPHONE ENCOUNTER
Reason for Conversation  Appointment    Background   Received attached staff message from Dr. Gaston. Provider ordered Feraheme x 3 doses. Patient has [employer] insurance; pre-cert pending review. Reached out to the patient to initiate scheduling. Patient is agreeable to proceed and requested Monday appointments only. Scheduled 3 doses of Feraheme starting Monday, 6/9/25 at 1:30 PM. Patient verbalized understanding and agreement regarding the discussed information via verbal feedback.

## 2025-06-09 ENCOUNTER — INFUSION (OUTPATIENT)
Dept: HEMATOLOGY/ONCOLOGY | Facility: HOSPITAL | Age: 30
End: 2025-06-09
Payer: COMMERCIAL

## 2025-06-09 VITALS
DIASTOLIC BLOOD PRESSURE: 79 MMHG | TEMPERATURE: 96.3 F | RESPIRATION RATE: 16 BRPM | SYSTOLIC BLOOD PRESSURE: 126 MMHG | HEART RATE: 58 BPM | OXYGEN SATURATION: 97 %

## 2025-06-09 DIAGNOSIS — Z86.2 H/O IRON DEFICIENCY ANEMIA: ICD-10-CM

## 2025-06-09 DIAGNOSIS — K90.9 IRON MALABSORPTION (HHS-HCC): ICD-10-CM

## 2025-06-09 PROCEDURE — 96365 THER/PROPH/DIAG IV INF INIT: CPT | Mod: INF

## 2025-06-09 PROCEDURE — 2500000004 HC RX 250 GENERAL PHARMACY W/ HCPCS (ALT 636 FOR OP/ED): Performed by: INTERNAL MEDICINE

## 2025-06-09 RX ORDER — ALBUTEROL SULFATE 0.83 MG/ML
3 SOLUTION RESPIRATORY (INHALATION) AS NEEDED
OUTPATIENT
Start: 2025-06-16

## 2025-06-09 RX ORDER — EPINEPHRINE 0.3 MG/.3ML
0.3 INJECTION SUBCUTANEOUS EVERY 5 MIN PRN
OUTPATIENT
Start: 2025-06-16

## 2025-06-09 RX ORDER — DIPHENHYDRAMINE HYDROCHLORIDE 50 MG/ML
50 INJECTION, SOLUTION INTRAMUSCULAR; INTRAVENOUS AS NEEDED
OUTPATIENT
Start: 2025-06-16

## 2025-06-09 RX ORDER — FAMOTIDINE 10 MG/ML
20 INJECTION, SOLUTION INTRAVENOUS ONCE AS NEEDED
OUTPATIENT
Start: 2025-06-16

## 2025-06-09 RX ADMIN — FERUMOXYTOL 510 MG: 510 INJECTION INTRAVENOUS at 13:46

## 2025-06-09 ASSESSMENT — PAIN SCALES - GENERAL: PAINLEVEL_OUTOF10: 0-NO PAIN

## 2025-06-09 ASSESSMENT — PATIENT HEALTH QUESTIONNAIRE - PHQ9
1. LITTLE INTEREST OR PLEASURE IN DOING THINGS: SEVERAL DAYS
2. FEELING DOWN, DEPRESSED OR HOPELESS: NOT AT ALL
SUM OF ALL RESPONSES TO PHQ9 QUESTIONS 1 & 2: 1

## 2025-06-16 ENCOUNTER — INFUSION (OUTPATIENT)
Dept: HEMATOLOGY/ONCOLOGY | Facility: HOSPITAL | Age: 30
End: 2025-06-16
Payer: COMMERCIAL

## 2025-06-16 VITALS
OXYGEN SATURATION: 96 % | BODY MASS INDEX: 42.18 KG/M2 | SYSTOLIC BLOOD PRESSURE: 161 MMHG | DIASTOLIC BLOOD PRESSURE: 84 MMHG | RESPIRATION RATE: 16 BRPM | TEMPERATURE: 96.8 F | WEIGHT: 315 LBS | HEART RATE: 73 BPM

## 2025-06-16 DIAGNOSIS — Z86.2 H/O IRON DEFICIENCY ANEMIA: ICD-10-CM

## 2025-06-16 DIAGNOSIS — K90.9 IRON MALABSORPTION (HHS-HCC): ICD-10-CM

## 2025-06-16 PROCEDURE — 96365 THER/PROPH/DIAG IV INF INIT: CPT | Mod: INF

## 2025-06-16 PROCEDURE — 2500000004 HC RX 250 GENERAL PHARMACY W/ HCPCS (ALT 636 FOR OP/ED): Mod: JZ | Performed by: INTERNAL MEDICINE

## 2025-06-16 RX ORDER — FAMOTIDINE 10 MG/ML
20 INJECTION, SOLUTION INTRAVENOUS ONCE AS NEEDED
OUTPATIENT
Start: 2025-06-23

## 2025-06-16 RX ORDER — EPINEPHRINE 0.3 MG/.3ML
0.3 INJECTION SUBCUTANEOUS EVERY 5 MIN PRN
OUTPATIENT
Start: 2025-06-23

## 2025-06-16 RX ORDER — ALBUTEROL SULFATE 0.83 MG/ML
3 SOLUTION RESPIRATORY (INHALATION) AS NEEDED
OUTPATIENT
Start: 2025-06-23

## 2025-06-16 RX ORDER — DIPHENHYDRAMINE HYDROCHLORIDE 50 MG/ML
50 INJECTION, SOLUTION INTRAMUSCULAR; INTRAVENOUS AS NEEDED
OUTPATIENT
Start: 2025-06-23

## 2025-06-16 RX ADMIN — FERUMOXYTOL 510 MG: 510 INJECTION INTRAVENOUS at 13:53

## 2025-06-16 ASSESSMENT — PAIN SCALES - GENERAL: PAINLEVEL_OUTOF10: 0-NO PAIN

## 2025-06-23 ENCOUNTER — INFUSION (OUTPATIENT)
Dept: HEMATOLOGY/ONCOLOGY | Facility: HOSPITAL | Age: 30
End: 2025-06-23
Payer: COMMERCIAL

## 2025-06-23 VITALS
DIASTOLIC BLOOD PRESSURE: 79 MMHG | TEMPERATURE: 96.6 F | HEART RATE: 53 BPM | RESPIRATION RATE: 16 BRPM | SYSTOLIC BLOOD PRESSURE: 126 MMHG | OXYGEN SATURATION: 96 % | WEIGHT: 315 LBS | BODY MASS INDEX: 42.49 KG/M2

## 2025-06-23 DIAGNOSIS — Z86.2 H/O IRON DEFICIENCY ANEMIA: ICD-10-CM

## 2025-06-23 DIAGNOSIS — K90.9 IRON MALABSORPTION (HHS-HCC): ICD-10-CM

## 2025-06-23 PROCEDURE — 2500000004 HC RX 250 GENERAL PHARMACY W/ HCPCS (ALT 636 FOR OP/ED): Performed by: INTERNAL MEDICINE

## 2025-06-23 PROCEDURE — 96365 THER/PROPH/DIAG IV INF INIT: CPT | Mod: INF

## 2025-06-23 RX ORDER — FAMOTIDINE 10 MG/ML
20 INJECTION, SOLUTION INTRAVENOUS ONCE AS NEEDED
OUTPATIENT
Start: 2025-06-23

## 2025-06-23 RX ORDER — EPINEPHRINE 0.3 MG/.3ML
0.3 INJECTION SUBCUTANEOUS EVERY 5 MIN PRN
OUTPATIENT
Start: 2025-06-23

## 2025-06-23 RX ORDER — FAMOTIDINE 10 MG/ML
20 INJECTION, SOLUTION INTRAVENOUS ONCE AS NEEDED
Status: DISCONTINUED | OUTPATIENT
Start: 2025-06-23 | End: 2025-06-23 | Stop reason: HOSPADM

## 2025-06-23 RX ORDER — EPINEPHRINE 0.3 MG/.3ML
0.3 INJECTION SUBCUTANEOUS EVERY 5 MIN PRN
Status: DISCONTINUED | OUTPATIENT
Start: 2025-06-23 | End: 2025-06-23 | Stop reason: HOSPADM

## 2025-06-23 RX ORDER — ALBUTEROL SULFATE 0.83 MG/ML
3 SOLUTION RESPIRATORY (INHALATION) AS NEEDED
OUTPATIENT
Start: 2025-06-23

## 2025-06-23 RX ORDER — DIPHENHYDRAMINE HYDROCHLORIDE 50 MG/ML
50 INJECTION, SOLUTION INTRAMUSCULAR; INTRAVENOUS AS NEEDED
Status: DISCONTINUED | OUTPATIENT
Start: 2025-06-23 | End: 2025-06-23 | Stop reason: HOSPADM

## 2025-06-23 RX ORDER — ALBUTEROL SULFATE 0.83 MG/ML
3 SOLUTION RESPIRATORY (INHALATION) AS NEEDED
Status: DISCONTINUED | OUTPATIENT
Start: 2025-06-23 | End: 2025-06-23 | Stop reason: HOSPADM

## 2025-06-23 RX ORDER — DIPHENHYDRAMINE HYDROCHLORIDE 50 MG/ML
50 INJECTION, SOLUTION INTRAMUSCULAR; INTRAVENOUS AS NEEDED
OUTPATIENT
Start: 2025-06-23

## 2025-06-23 RX ADMIN — FERUMOXYTOL 510 MG: 510 INJECTION INTRAVENOUS at 14:03

## 2025-06-23 ASSESSMENT — PAIN SCALES - GENERAL: PAINLEVEL_OUTOF10: 0-NO PAIN
